# Patient Record
Sex: FEMALE | Race: BLACK OR AFRICAN AMERICAN | NOT HISPANIC OR LATINO | Employment: FULL TIME | URBAN - METROPOLITAN AREA
[De-identification: names, ages, dates, MRNs, and addresses within clinical notes are randomized per-mention and may not be internally consistent; named-entity substitution may affect disease eponyms.]

---

## 2017-01-30 ENCOUNTER — OFFICE VISIT (OUTPATIENT)
Dept: OBSTETRICS AND GYNECOLOGY | Facility: CLINIC | Age: 32
End: 2017-01-30
Payer: MEDICAID

## 2017-01-30 VITALS
HEIGHT: 68 IN | WEIGHT: 249.81 LBS | BODY MASS INDEX: 37.86 KG/M2 | SYSTOLIC BLOOD PRESSURE: 116 MMHG | DIASTOLIC BLOOD PRESSURE: 66 MMHG | TEMPERATURE: 99 F

## 2017-01-30 DIAGNOSIS — R10.2 PELVIC PAIN IN FEMALE: Primary | ICD-10-CM

## 2017-01-30 DIAGNOSIS — E11.9 CONTROLLED TYPE 2 DIABETES MELLITUS WITHOUT COMPLICATION, WITHOUT LONG-TERM CURRENT USE OF INSULIN: ICD-10-CM

## 2017-01-30 DIAGNOSIS — N94.10 DYSPAREUNIA, FEMALE: ICD-10-CM

## 2017-01-30 PROCEDURE — 99213 OFFICE O/P EST LOW 20 MIN: CPT | Mod: PBBFAC | Performed by: OBSTETRICS & GYNECOLOGY

## 2017-01-30 PROCEDURE — 99999 PR PBB SHADOW E&M-EST. PATIENT-LVL III: CPT | Mod: PBBFAC,,, | Performed by: OBSTETRICS & GYNECOLOGY

## 2017-01-30 PROCEDURE — 99213 OFFICE O/P EST LOW 20 MIN: CPT | Mod: S$PBB,,, | Performed by: OBSTETRICS & GYNECOLOGY

## 2017-01-30 RX ORDER — TRIAMTERENE AND HYDROCHLOROTHIAZIDE 37.5; 25 MG/1; MG/1
CAPSULE ORAL
Refills: 5 | COMMUNITY
Start: 2017-01-03 | End: 2018-10-30 | Stop reason: CLARIF

## 2017-01-30 RX ORDER — AMITRIPTYLINE HYDROCHLORIDE 25 MG/1
25 TABLET, FILM COATED ORAL DAILY
Refills: 2 | COMMUNITY
Start: 2017-01-17 | End: 2017-01-30

## 2017-01-30 RX ORDER — TRAMADOL HYDROCHLORIDE 50 MG/1
50 TABLET ORAL 2 TIMES DAILY
Refills: 0 | COMMUNITY
Start: 2017-01-17 | End: 2017-05-20

## 2017-01-30 NOTE — MR AVS SNAPSHOT
[facility] - OB/ GYN  [address]  [address]  [place] [address]  Phone: [phone]                  Dimas Aguayo   2017 3:10 PM   Office Visit    Description:  Female : 1985   Provider:  Waqas Jiang MD   Department:  [facility] - OB/ GYN           Reason for Visit     Pelvic Pain           Diagnoses this Visit        Comments    Pelvic pain in female    -  Primary     Controlled type 2 diabetes mellitus without complication, without long-term current use of insulin         Dyspareunia, female                To Do List           Goals (5 Years of Data)     None      Follow-Up and Disposition     Return in about 1 year (around 2018).      [facility]     [facility] Nurse Care Line -  Assistance  Registered nurses in the [facility] provide clinical advisement, health education, appointment booking, and other advisory services.  Call for this free service at [phone].             Medications           Message regarding Medications     Verify the changes and/or additions to your medication regime listed below are the same as discussed with your clinician today.  If any of these changes or additions are incorrect, please notify your healthcare provider.        STOP taking these medications     zolpidem (AMBIEN) 10 mg Tab Take 10 mg by mouth nightly as needed.    ibuprofen (ADVIL,MOTRIN) 800 MG tablet Take 1 tablet (800 mg total) by mouth every 6 (six) hours as needed for Pain.    amitriptyline (ELAVIL) 25 MG tablet Take 25 mg by mouth once daily.           Verify that the below list of medications is an accurate representation of the medications you are currently taking.  If none reported, the list may be blank. If incorrect, please contact your healthcare provider. Carry this list with you in case of emergency.           Current Medications     metformin (GLUCOPHAGE) 1000 MG tablet     pregabalin (LYRICA) 300 MG Cap Take 300 mg by mouth 3 (three) times  "daily.    tramadol (ULTRAM) 50 mg tablet Take 50 mg by mouth 2 (two) times daily.    triamterene-hydrochlorothiazide 37.5-25 mg (DYAZIDE) 37.5-25 mg per capsule TAKE 1 CAPSULE DAILY ORAL FOR 30 DAYS    TRUERESULT BLOOD GLUCOSE SYSTM kit USE WITH TEST STRIPS THREE TIMES A DAY    TRUETEST TEST STRIPS Strp TEST BLOOD GLUCOSE THREE TIMES A DAY    ULTRA THIN LANCETS 30 gauge Misc USE TO TEST BLOOD GLUCOSE THREE TIMES A DAY           Clinical Reference Information           Vital Signs - Last Recorded  Most recent update: 1/30/2017  4:25 PM by Josseline Mendoza MA    BP Temp Ht Wt LMP BMI    116/66 (BP Location: Right arm, Patient Position: Sitting, BP Method: Manual) 98.6 °F (37 °C) (Oral) 5' 8" (1.727 m) 113.3 kg (249 lb 12.5 oz) 05/23/2013 37.98 kg/m2      Blood Pressure          Most Recent Value    BP  116/66      Allergies as of 1/30/2017     No Known Allergies      Immunizations Administered on Date of Encounter - 1/30/2017     None      "

## 2017-01-30 NOTE — PROGRESS NOTES
"  Subjective:       Patient ID: Dimas Aguayo is a 31 y.o. female.    Chief Complaint:  Pelvic Pain      History of Present Illness  HPI  Patient comes in today with multiple complaints.  1.  She still has occasional pelvic pain.  But none today.  She is status post hysterectomy for pain.  Significantly improves since  2.  She occasionally would have dyspareunia.  But recently, she has had no problem.  However, her partner seems to have a severe headache "every time he would climax!"  3.  She was recently diagnosed with diabetes mellitus, type II.  She was given medication.  But she had no guidance as to what to eat and what else to do to help control her glucose.  Her fasting has been about 70-90 with 2hr postprandial less than 140 mostly.      GYN & OB History  Patient's last menstrual period was 2013.   Date of Last Pap: 2015    OB History    Para Term  AB SAB TAB Ectopic Multiple Living   3 2 2  1     2      # Outcome Date GA Lbr Doug/2nd Weight Sex Delivery Anes PTL Lv   3 Term 09 40w0d  3.714 kg (8 lb 3 oz) M Vag-Spont EPI N Y   2 AB 2006           1 Term 01 40w0d  3.657 kg (8 lb 1 oz) F Vag-Spont EPI N Y        Past Medical History   Diagnosis Date    Asthma      as a child    Diabetes mellitus     Fibromyalgia     GERD (gastroesophageal reflux disease)     Herpes simplex without mention of complication     History of pulmonary embolus during pregnancy 2009     after her second  section    Hypertension      off meds x 1 year    Mental disorder      depression and migraine headaches       Past Surgical History   Procedure Laterality Date    Tubal ligation  2009    Marsupialization of bartholin's cyst in  and   2004 and      section      Dilation and curettage of uterus       for elective abortions    Hta  2012     Dr Jiang    Uterine ablation  12    Hysterectomy  2013     [facility]       Family History   Problem " Relation Age of Onset    Diabetes Mother     Hypertension Mother     Stroke Mother        Social History     Social History    Marital status: Single     Spouse name: N/A    Number of children: N/A    Years of education: N/A     Social History Main Topics    Smoking status: Former Smoker     Quit date: 1/1/2015    Smokeless tobacco: Never Used      Comment: One- 2  cigarettes  / day    Alcohol use 0.6 oz/week     1 Shots of liquor per week      Comment: occassionally    Drug use: No    Sexual activity: Yes     Partners: Male     Birth control/ protection: Surgical     Other Topics Concern    None     Social History Narrative    Working as a make-up artist    New partner for the past two 2014       Current Outpatient Prescriptions   Medication Sig Dispense Refill    metformin (GLUCOPHAGE) 1000 MG tablet       pregabalin (LYRICA) 300 MG Cap Take 300 mg by mouth 3 (three) times daily.      tramadol (ULTRAM) 50 mg tablet Take 50 mg by mouth 2 (two) times daily.  0    triamterene-hydrochlorothiazide 37.5-25 mg (DYAZIDE) 37.5-25 mg per capsule TAKE 1 CAPSULE DAILY ORAL FOR 30 DAYS  5    TRUERESULT BLOOD GLUCOSE SYSTM kit USE WITH TEST STRIPS THREE TIMES A DAY  0    TRUETEST TEST STRIPS Strp TEST BLOOD GLUCOSE THREE TIMES A DAY  5    ULTRA THIN LANCETS 30 gauge Misc USE TO TEST BLOOD GLUCOSE THREE TIMES A DAY  5     No current facility-administered medications for this visit.        Review of patient's allergies indicates:  No Known Allergies      Review of Systems  Review of Systems   Constitutional: Negative for activity change, appetite change, chills, fatigue, fever and unexpected weight change.   HENT: Negative for mouth sores.    Respiratory: Negative for cough, shortness of breath and wheezing.    Cardiovascular: Negative for chest pain and palpitations.   Gastrointestinal: Negative for abdominal pain, bloating, blood in stool, constipation, nausea and vomiting.   Endocrine: Negative for diabetes  and hot flashes.   Genitourinary: Positive for dyspareunia and pelvic pain. Negative for dysuria, frequency, hematuria, menorrhagia, menstrual problem, urgency, vaginal bleeding, vaginal discharge, vaginal pain, dysmenorrhea, urinary incontinence, postcoital bleeding and vaginal odor.   Musculoskeletal: Negative for back pain and myalgias.   Skin:  Negative for rash.   Neurological: Negative for seizures and headaches.   Psychiatric/Behavioral: Negative for depression and sleep disturbance. The patient is nervous/anxious.    Breast: Negative for breast mass, breast pain and nipple discharge          Objective:    Physical Exam:   Constitutional: She appears well-developed and well-nourished. No distress.    HENT:   Head: Normocephalic and atraumatic.    Eyes: EOM are normal.    Neck: Normal range of motion.     Pulmonary/Chest: Effort normal. No respiratory distress.        Abdominal: Soft. She exhibits no distension. There is no tenderness. There is no rebound and no guarding.     Genitourinary: Vagina normal. No vaginal discharge found.   Genitourinary Comments: Vulva without any obvious lesions.  Vaginal vault with good support.  Minimal discharge noted.  No obvious lesion.  Cervix is without any cervical motion tenderness.  No obvious lesion.  Very high in vaginal vault.  Uterus is surgically absent.  Adnexa is without any masses or tenderness.           Musculoskeletal: Normal range of motion.       Neurological: She is alert.    Skin: Skin is warm and dry.    Psychiatric: She has a normal mood and affect.          Assessment:        1. Pelvic pain in female    2. Controlled type 2 diabetes mellitus without complication, without long-term current use of insulin    3. Dyspareunia, female             Plan:      I have extensively discussed with the patient regarding her condition  We will refer her to dietary for a proper diet.  Diabetic teaching discussed    I suggest that her partner should get a proper  evaluation  She will be back as needed.

## 2017-04-12 ENCOUNTER — TELEPHONE (OUTPATIENT)
Dept: OBSTETRICS AND GYNECOLOGY | Facility: CLINIC | Age: 32
End: 2017-04-12

## 2017-04-12 NOTE — TELEPHONE ENCOUNTER
----- Message from Morenita Thao sent at 4/12/2017 10:16 AM CDT -----  Contact: self  Pt is requesting a rx for yeast. Please call pt with advise [phone]          Thanks  04/12/2017 10:59 AM  informed her that cultures have not been take since 2015 and  would want to see her in the office., pt tried to argue with me and tell me that she was cultured many of times in 2016 and that I don't know what I'm looking at in her chart. Pt asked to speak to Safia and pt is aware that  and Safia are out of the office until next week. Pt aware request has been routed to .

## 2017-04-13 ENCOUNTER — TELEPHONE (OUTPATIENT)
Dept: OBSTETRICS AND GYNECOLOGY | Facility: CLINIC | Age: 32
End: 2017-04-13

## 2017-04-13 NOTE — TELEPHONE ENCOUNTER
----- Message from Jessie Drake sent at 4/13/2017  3:42 PM CDT -----  Contact: SELF  Patient is checking on the status of the message she left yesterday .     [phone]          04/13/2017 4:20 PM  Informed pt that  is off until monday

## 2017-04-19 DIAGNOSIS — B37.31 CANDIDA VAGINITIS: Primary | ICD-10-CM

## 2017-04-19 DIAGNOSIS — A60.04 HERPES SIMPLEX VULVOVAGINITIS: ICD-10-CM

## 2017-04-19 RX ORDER — FLUCONAZOLE 150 MG/1
150 TABLET ORAL DAILY
Qty: 1 TABLET | Refills: 0 | Status: SHIPPED | OUTPATIENT
Start: 2017-04-19 | End: 2017-04-20

## 2017-04-19 RX ORDER — VALACYCLOVIR HYDROCHLORIDE 500 MG/1
500 TABLET, FILM COATED ORAL 2 TIMES DAILY
Qty: 10 TABLET | Refills: 2 | Status: SHIPPED | OUTPATIENT
Start: 2017-04-19 | End: 2017-10-25

## 2017-04-19 NOTE — TELEPHONE ENCOUNTER
Pt requesting rx for yeast and valtrex. Refill request submitted to provider. Pt informed.     Diflucan and Valtrex given per request

## 2017-04-19 NOTE — TELEPHONE ENCOUNTER
----- Message from Yeimi Ribera sent at 4/19/2017  1:39 PM CDT -----  Contact: self  Pt calling to discuss a personal matter. Please call [phone]

## 2017-04-28 ENCOUNTER — HOSPITAL ENCOUNTER (EMERGENCY)
Facility: OTHER | Age: 32
Discharge: HOME | End: 2017-04-28
Attending: EMERGENCY MEDICINE
Payer: MEDICAID

## 2017-04-28 VITALS
TEMPERATURE: 97 F | HEART RATE: 88 BPM | RESPIRATION RATE: 18 BRPM | OXYGEN SATURATION: 97 % | SYSTOLIC BLOOD PRESSURE: 140 MMHG | HEIGHT: 68 IN | BODY MASS INDEX: 36.68 KG/M2 | DIASTOLIC BLOOD PRESSURE: 97 MMHG | WEIGHT: 242 LBS

## 2017-04-28 DIAGNOSIS — H60.501 ACUTE OTITIS EXTERNA OF RIGHT EAR, UNSPECIFIED TYPE: ICD-10-CM

## 2017-04-28 DIAGNOSIS — H66.91 RIGHT OTITIS MEDIA, UNSPECIFIED CHRONICITY, UNSPECIFIED OTITIS MEDIA TYPE: Primary | ICD-10-CM

## 2017-04-28 PROBLEM — H60.90 OTITIS EXTERNA: Status: ACTIVE | Noted: 2017-04-28

## 2017-04-28 LAB — POCT GLUCOSE: 111 MG/DL (ref 70–110)

## 2017-04-28 PROCEDURE — 25000003 PHARM REV CODE 250: Performed by: EMERGENCY MEDICINE

## 2017-04-28 PROCEDURE — 99284 EMERGENCY DEPT VISIT MOD MDM: CPT

## 2017-04-28 RX ORDER — TRAMADOL HYDROCHLORIDE 50 MG/1
50 TABLET ORAL EVERY 8 HOURS PRN
Qty: 15 TABLET | Refills: 0 | Status: SHIPPED | OUTPATIENT
Start: 2017-04-28 | End: 2017-05-08

## 2017-04-28 RX ORDER — NEOMYCIN SULFATE, POLYMYXIN B SULFATE AND HYDROCORTISONE 10; 3.5; 1 MG/ML; MG/ML; [USP'U]/ML
3 SUSPENSION/ DROPS AURICULAR (OTIC) 3 TIMES DAILY
Qty: 15 ML | Refills: 0 | Status: SHIPPED | OUTPATIENT
Start: 2017-04-28 | End: 2017-10-25

## 2017-04-28 RX ORDER — ACETAMINOPHEN 500 MG
1000 TABLET ORAL
Status: COMPLETED | OUTPATIENT
Start: 2017-04-28 | End: 2017-04-28

## 2017-04-28 RX ORDER — AMOXICILLIN 500 MG/1
500 CAPSULE ORAL 3 TIMES DAILY
Qty: 21 CAPSULE | Refills: 0 | Status: SHIPPED | OUTPATIENT
Start: 2017-04-28 | End: 2017-05-05

## 2017-04-28 RX ADMIN — ACETAMINOPHEN 1000 MG: 500 TABLET ORAL at 10:04

## 2017-04-28 NOTE — ED AVS SNAPSHOT
[facility] EMERGENCY DEPARTMENT  [address] [place] [address]               Dimas Aguayo   2017 10:00 AM   ED    Description:  Female : 1985   Department:  [facility]           Your Care was Coordinated By:     Provider Role From To    Tito Diane MD Attending Provider [phone] --      Reason for Visit     Otalgia           Diagnoses this Visit        Comments    Right otitis media, unspecified chronicity, unspecified otitis media type    -  Primary     Acute otitis externa of right ear, unspecified type           ED Disposition     ED Disposition Condition Comment    Discharge  Dimas Aguayo discharge to home/self care.    - Condition at discharge: Stable  - Mode of Discharge: by walking out            To Do List           Follow-up Information     Follow up with Conner Tellez Jr, MD In 2 days.    Specialty:  Family Medicine    Why:  recheck    Contact information:    [address] [facility] [address]  [phone]         These Medications        Disp Refills Start End    neomycin-polymyxin-hydrocortisone (CORTISPORIN) 3.5-10,000-1 mg/mL-unit/mL-% otic suspension 15 mL 0 2017     Place 3 drops into both ears 3 (three) times daily. - Both Ears    Pharmacy: [facility]/pharmacy #[id number] - [address] Ph #: [phone]       amoxicillin (AMOXIL) 500 MG capsule 21 capsule 0 2017    Take 1 capsule (500 mg total) by mouth 3 (three) times daily. - Oral    Pharmacy: [facility]/pharmacy #[id number] [address] Ph #: [phone]       tramadol (ULTRAM) 50 mg tablet 15 tablet 0 2017    Take 1 tablet (50 mg total) by mouth every 8 (eight) hours as needed for Pain. - Oral    Pharmacy: [facility]/pharmacy #[id number] [address] Ph #: [phone]         [facility]     [facility] Care [facility] -   Assistance  Unless otherwise directed by your provider, please contact [facility], our nurse care line that is available for 24/7 assistance.     Registered nurses in the [facility] provide: appointment scheduling, clinical advisement, health education, and other advisory services.  Call: [phone] (toll free)               Medications           Message regarding Medications     Verify the changes and/or additions to your medication regime listed below are the same as discussed with your clinician today.  If any of these changes or additions are incorrect, please notify your healthcare provider.        START taking these NEW medications        Refills    neomycin-polymyxin-hydrocortisone (CORTISPORIN) 3.5-10,000-1 mg/mL-unit/mL-% otic suspension 0    Sig: Place 3 drops into both ears 3 (three) times daily.    Class: Print    Route: Both Ears    amoxicillin (AMOXIL) 500 MG capsule 0    Sig: Take 1 capsule (500 mg total) by mouth 3 (three) times daily.    Class: Print    Route: Oral    tramadol (ULTRAM) 50 mg tablet 0    Sig: Take 1 tablet (50 mg total) by mouth every 8 (eight) hours as needed for Pain.    Class: Print    Route: Oral      These medications were administered today        Dose Freq    acetaminophen tablet 1,000 mg 1,000 mg ED 1 Time    Sig: Take 2 tablets (1,000 mg total) by mouth ED 1 Time.    Class: Normal    Route: Oral           Verify that the below list of medications is an accurate representation of the medications you are currently taking.  If none reported, the list may be blank. If incorrect, please contact your healthcare provider. Carry this list with you in case of emergency.           Current Medications     acetaminophen tablet 1,000 mg Take 2 tablets (1,000 mg total) by mouth ED 1 Time.    amoxicillin (AMOXIL) 500 MG capsule Take 1 capsule (500 mg total) by mouth 3 (three) times daily.    metformin (GLUCOPHAGE) 1000 MG tablet     neomycin-polymyxin-hydrocortisone  "(CORTISPORIN) 3.5-10,000-1 mg/mL-unit/mL-% otic suspension Place 3 drops into both ears 3 (three) times daily.    pregabalin (LYRICA) 300 MG Cap Take 300 mg by mouth 3 (three) times daily.    tramadol (ULTRAM) 50 mg tablet Take 50 mg by mouth 2 (two) times daily.    tramadol (ULTRAM) 50 mg tablet Take 1 tablet (50 mg total) by mouth every 8 (eight) hours as needed for Pain.    triamterene-hydrochlorothiazide 37.5-25 mg (DYAZIDE) 37.5-25 mg per capsule TAKE 1 CAPSULE DAILY ORAL FOR 30 DAYS    TRUERESULT BLOOD GLUCOSE SYSTM kit USE WITH TEST STRIPS THREE TIMES A DAY    TRUETEST TEST STRIPS Strp TEST BLOOD GLUCOSE THREE TIMES A DAY    ULTRA THIN LANCETS 30 gauge Misc USE TO TEST BLOOD GLUCOSE THREE TIMES A DAY    valacyclovir (VALTREX) 500 MG tablet Take 1 tablet (500 mg total) by mouth 2 (two) times daily.           Clinical Reference Information           Your Vitals Were     BP Pulse Temp Resp Height Weight    140/97 (BP Location: Left arm, Patient Position: Sitting) 88 97 °F (36.1 °C) (Temporal) 18 5' 8" (1.727 m) 109.8 kg (242 lb)    Last Period SpO2 BMI          05/23/2013 97% 36.8 kg/m2        Allergies as of 4/28/2017     No Known Allergies      Immunizations Administered on Date of Encounter - 4/28/2017     None      ED Micro, Lab, POCT     Start Ordered       Status Ordering Provider    04/28/17 1007 04/28/17 1007  POCT glucose  Once      Final result       ED Imaging Orders     None        Discharge Instructions         Reducing the Risk of Middle Ear Infections     Good handwashing can help your child prevent ear infections.   Most children have had at least one middle ear infection by the age of 2. Treatment may depend on whether the problem is acute or chronic, as well as how often it comes back and how long it lasts.   Reducing risk factors  Some behaviors or surroundings increase your childs risk of ear infection. Reducing such risk factors can be helpful at any point in treatment. The tips below may " help.  · If your child goes to group , he or she runs a greater risk of getting colds or flu, which may then lead to an ear infection. Help prevent these illnesses by teaching your child to wash his or her hands often.  · If your child has nasal allergies, do your best to control dust, mold, mildew, and pet hair in the house. Also stop or greatly limit your childs contact with secondhand smoke.  · If food allergies are a problem, identify the food that triggers the reaction and help your child avoid it.  Watching and waiting  · If your child is diagnosed with an ear infection, the doctor may prescribe antibiotics and will suggest a period of watchful waiting. This means not filling any prescriptions right away. Instead of antibiotics, a trial of medications to relieve symptoms including those for pain or fever, is advised, along with waiting some time to see if a child improves without antibiotic therapy. Whether or not your doctor prescribes immediate antibiotics or a period of watchful waiting depends on your child's age and risk factors.  · During this time, your child should be monitored to see if his or her symptoms are improving and to make sure new symptoms, such as fever or vomiting, don't develop. If a child doesn't improve within a few days or develops new symptoms, antibiotics will usually be started.    Date Last Reviewed: 9/3/2014  © 4891-2704 [employer]. [address] [place] [address], [place] [address]. All rights reserved. This information is not intended as a substitute for professional medical care. Always follow your healthcare professional's instructions.          External Ear Infection (Adult)    External otitis (also called swimmers ear) is an infection in the ear canal. It is often caused by bacteria or fungus. It can occur a few days after water gets trapped in the ear canal (from swimming or bathing). It can also occur after cleaning too deeply in the ear canal with a  cotton swab or other object. Sometimes, hair care products get into the ear canal and cause this problem.  Symptoms can include pain, fever, itching, redness, drainage, or swelling of the ear canal. Temporary hearing loss may also occur.  Home care  · Do not try to clean the ear canal. This can push pus and bacteria deeper into the canal.  · Use prescribed ear drops as directed. These help reduce swelling and fight the infection. If an ear wick was placed in the ear canal, apply drops right onto the end of the wick. The wick will draw the medication into the ear canal even if it is swollen closed.  · A cotton ball may be loosely placed in the outer ear to absorb any drainage.  · You may use acetaminophen or ibuprofen to control pain, unless another medication was prescribed. Note: If you have chronic liver or kidney disease or ever had a stomach ulcer or GI bleeding, talk to your health care provider before taking any of these medications.  · Do not allow water to get into your ear when bathing. Also, avoid swimming until the infection has cleared.  Prevention  · Keep your ears dry. This helps lower the risk of infection. Dry your ears with a towel or hair dryer after getting wet. Also, use ear plugs when swimming.  · Do not stick any objects in the ear to remove wax.  · If you feel water trapped in your ear, use ear drops right away. You can get these drops over the counter at most drugstores. They work by removing water from the ear canal.  Follow-up care  Follow up with your health care provider in one week, or as advised.  When to seek medical advice  Call your health care provider right away if any of these occur:  · Ear pain becomes worse or doesnt improve after 3 days of treatment  · Redness or swelling of the outer ear occurs or gets worse  · Headache  · Painful or stiff neck  · Drowsiness or confusion  · Fever of 100.4ºF (38ºC) or higher, or as directed by your health care provider  · Seizure  Date Last  Reviewed: 3/22/2015  © 8579-9684 [employer] [address] [place] [address], [place] [address]. All rights reserved. This information is not intended as a substitute for professional medical care. Always follow your healthcare professional's instructions.          Discharge References/Attachments     OTITIS MEDIA, ANTIBIOTIC TREATMENT (ADULT) (ENGLISH)    EXTERNAL EAR INFECTION (ADULT) (ENGLISH)      Smoking Cessation     If you would like to quit smoking:   You may be eligible for free services if you are a [place] resident and started smoking cigarettes before September 1, 1988.  Call the Smoking Cessation Trust (SCT) toll free at (879) 219-7578 or (525) 619-1501.   Call 1-800-QUIT-NOW if you do not meet the above criteria.   Contact us via email: [email or web address]   View our website for more information: [email or web address]         [facility] complies with applicable Federal civil rights laws and does not discriminate on the basis of race, color, national origin, age, disability, or sex.        Language Assistance Services     ATTENTION: Language assistance services are available, free of charge. Please call [phone].      ATENCIÓN: Si habla español, tiene a perrin disposición servicios gratuitos de asistencia lingüística. Llame al [phone].     CHÚ Ý: N?u b?n nói Ti?ng Vi?t, có các d?ch v? h? tr? ngôn ng? mi?n phí dành cho b?n. G?i s? [phone].

## 2017-04-28 NOTE — ED PROVIDER NOTES
"Encounter Date: 2017       History     Chief Complaint   Patient presents with    Otalgia      x 1 day     Review of patient's allergies indicates:  No Known Allergies  HPI Comments: Patient reports pain in right ear for one day.  Put hydrogen peroxide in the ear and it caused "bubbling and more pain".  Reports history of diabetes which she states she controls well and her sugars are always right around 100.  Has no other complaints.    Patient is a 31 y.o. female presenting with the following complaint: ear pain.   Otalgia   This is a new problem. The current episode started yesterday. There is pain in the right ear. The problem occurs constantly. The problem has been unchanged. Pertinent negatives include no ear discharge, no headaches, no hearing loss, no rhinorrhea, no sore throat, no abdominal pain, no diarrhea, no vomiting, no cough and no rash. Her past medical history does not include chronic ear infection, hearing loss or tympanostomy tube.     Past Medical History:   Diagnosis Date    Asthma     as a child    Diabetes mellitus     Fibromyalgia     GERD (gastroesophageal reflux disease)     Herpes simplex without mention of complication     History of pulmonary embolus during pregnancy 2009    after her second  section    Hypertension     off meds x 1 year    Mental disorder     depression and migraine headaches     Past Surgical History:   Procedure Laterality Date     SECTION      DILATION AND CURETTAGE OF UTERUS      for elective abortions    HTA  2012    Dr Jiang    [facility]    marsupialization of Bartholin's cyst in  and 2005 and     TUBAL LIGATION  2009    uterine ablation  12     Family History   Problem Relation Age of Onset    Diabetes Mother     Hypertension Mother     Stroke Mother      Social History   Substance Use Topics    Smoking status: Former Smoker     Quit date: 2015    Smokeless tobacco: " Never Used      Comment: One- 2  cigarettes  / day    Alcohol use 0.6 oz/week     1 Shots of liquor per week      Comment: occassionally     Review of Systems   Constitutional: Negative.    HENT: Positive for ear pain. Negative for ear discharge, facial swelling, hearing loss, nosebleeds, postnasal drip, rhinorrhea, sinus pressure and sore throat.    Respiratory: Negative for cough.    Gastrointestinal: Negative for abdominal pain, diarrhea and vomiting.   Skin: Negative for rash.   Neurological: Negative for headaches.   All other systems reviewed and are negative.      Physical Exam   Initial Vitals   BP Pulse Resp Temp SpO2   04/28/17 1001 04/28/17 1001 04/28/17 1001 04/28/17 1001 04/28/17 1001   140/97 88 18 97 °F (36.1 °C) 97 %     Physical Exam    Nursing note and vitals reviewed.  Constitutional: She appears well-developed and well-nourished. She is not diaphoretic. She appears distressed.   HENT:   Head: Normocephalic and atraumatic.   Right Ear: No lacerations. There is drainage and swelling. No foreign bodies. No mastoid tenderness. Tympanic membrane is erythematous. A middle ear effusion is present. No hemotympanum.   Left Ear: Hearing, tympanic membrane, external ear and ear canal normal.   Ears:    Nose: Nose normal.   Mouth/Throat: Oropharynx is clear and moist.   Eyes: EOM are normal. Pupils are equal, round, and reactive to light.   Pulmonary/Chest: No respiratory distress.   Musculoskeletal: Normal range of motion.   Neurological: She is alert and oriented to person, place, and time.   Skin: Skin is warm and dry. No rash noted. No erythema.   Psychiatric: She has a normal mood and affect. Her behavior is normal. Judgment and thought content normal.         ED Course   Procedures  Labs Reviewed   POCT GLUCOSE - Abnormal; Notable for the following:        Result Value    POCT Glucose 111 (*)     All other components within normal limits             Medical Decision Making:   ED Management:  Patient  is stable for discharge we'll treat for otitis media and otitis externa.  We discussed keeping strict glucose control.  We discussed worrisome signs that should prompt need to return to the ER should they occur.  There is no indication for further emergent intervention or evaluation at this time.                   ED Course     Clinical Impression:   The primary encounter diagnosis was Right otitis media, unspecified chronicity, unspecified otitis media type. A diagnosis of Acute otitis externa of right ear, unspecified type was also pertinent to this visit.          Tito Diane MD  04/29/17 0705

## 2017-04-28 NOTE — DISCHARGE INSTRUCTIONS
Reducing the Risk of Middle Ear Infections     Good handwashing can help your child prevent ear infections.   Most children have had at least one middle ear infection by the age of 2. Treatment may depend on whether the problem is acute or chronic, as well as how often it comes back and how long it lasts.   Reducing risk factors  Some behaviors or surroundings increase your childs risk of ear infection. Reducing such risk factors can be helpful at any point in treatment. The tips below may help.  · If your child goes to group , he or she runs a greater risk of getting colds or flu, which may then lead to an ear infection. Help prevent these illnesses by teaching your child to wash his or her hands often.  · If your child has nasal allergies, do your best to control dust, mold, mildew, and pet hair in the house. Also stop or greatly limit your childs contact with secondhand smoke.  · If food allergies are a problem, identify the food that triggers the reaction and help your child avoid it.  Watching and waiting  · If your child is diagnosed with an ear infection, the doctor may prescribe antibiotics and will suggest a period of watchful waiting. This means not filling any prescriptions right away. Instead of antibiotics, a trial of medications to relieve symptoms including those for pain or fever, is advised, along with waiting some time to see if a child improves without antibiotic therapy. Whether or not your doctor prescribes immediate antibiotics or a period of watchful waiting depends on your child's age and risk factors.  · During this time, your child should be monitored to see if his or her symptoms are improving and to make sure new symptoms, such as fever or vomiting, don't develop. If a child doesn't improve within a few days or develops new symptoms, antibiotics will usually be started.    Date Last Reviewed: 9/3/2014  © 3934-9438 [employer]. [address], [place]  [address]. All rights reserved. This information is not intended as a substitute for professional medical care. Always follow your healthcare professional's instructions.          External Ear Infection (Adult)    External otitis (also called swimmers ear) is an infection in the ear canal. It is often caused by bacteria or fungus. It can occur a few days after water gets trapped in the ear canal (from swimming or bathing). It can also occur after cleaning too deeply in the ear canal with a cotton swab or other object. Sometimes, hair care products get into the ear canal and cause this problem.  Symptoms can include pain, fever, itching, redness, drainage, or swelling of the ear canal. Temporary hearing loss may also occur.  Home care  · Do not try to clean the ear canal. This can push pus and bacteria deeper into the canal.  · Use prescribed ear drops as directed. These help reduce swelling and fight the infection. If an ear wick was placed in the ear canal, apply drops right onto the end of the wick. The wick will draw the medication into the ear canal even if it is swollen closed.  · A cotton ball may be loosely placed in the outer ear to absorb any drainage.  · You may use acetaminophen or ibuprofen to control pain, unless another medication was prescribed. Note: If you have chronic liver or kidney disease or ever had a stomach ulcer or GI bleeding, talk to your health care provider before taking any of these medications.  · Do not allow water to get into your ear when bathing. Also, avoid swimming until the infection has cleared.  Prevention  · Keep your ears dry. This helps lower the risk of infection. Dry your ears with a towel or hair dryer after getting wet. Also, use ear plugs when swimming.  · Do not stick any objects in the ear to remove wax.  · If you feel water trapped in your ear, use ear drops right away. You can get these drops over the counter at most drugstores. They work by removing water from the  ear canal.  Follow-up care  Follow up with your health care provider in one week, or as advised.  When to seek medical advice  Call your health care provider right away if any of these occur:  · Ear pain becomes worse or doesnt improve after 3 days of treatment  · Redness or swelling of the outer ear occurs or gets worse  · Headache  · Painful or stiff neck  · Drowsiness or confusion  · Fever of 100.4ºF (38ºC) or higher, or as directed by your health care provider  · Seizure  Date Last Reviewed: 3/22/2015  © 9166-5560 [employer]. [address] [place] [address]. All rights reserved. This information is not intended as a substitute for professional medical care. Always follow your healthcare professional's instructions.

## 2017-05-20 ENCOUNTER — HOSPITAL ENCOUNTER (EMERGENCY)
Facility: HOSPITAL | Age: 32
Discharge: HOME | End: 2017-05-20
Attending: EMERGENCY MEDICINE
Payer: MEDICAID

## 2017-05-20 VITALS
DIASTOLIC BLOOD PRESSURE: 78 MMHG | RESPIRATION RATE: 20 BRPM | HEART RATE: 85 BPM | BODY MASS INDEX: 36.07 KG/M2 | HEIGHT: 68 IN | TEMPERATURE: 98 F | SYSTOLIC BLOOD PRESSURE: 126 MMHG | WEIGHT: 238 LBS | OXYGEN SATURATION: 95 %

## 2017-05-20 DIAGNOSIS — S61.412A LACERATION OF LEFT HAND WITHOUT FOREIGN BODY, INITIAL ENCOUNTER: Primary | ICD-10-CM

## 2017-05-20 PROCEDURE — 99283 EMERGENCY DEPT VISIT LOW MDM: CPT | Mod: 25

## 2017-05-20 PROCEDURE — 25000003 PHARM REV CODE 250: Performed by: PHYSICIAN ASSISTANT

## 2017-05-20 PROCEDURE — 12002 RPR S/N/AX/GEN/TRNK2.6-7.5CM: CPT

## 2017-05-20 RX ORDER — SULFAMETHOXAZOLE AND TRIMETHOPRIM 800; 160 MG/1; MG/1
2 TABLET ORAL 2 TIMES DAILY
Qty: 28 TABLET | Refills: 0 | Status: SHIPPED | OUTPATIENT
Start: 2017-05-20 | End: 2017-05-27

## 2017-05-20 RX ORDER — CLINDAMYCIN HYDROCHLORIDE 150 MG/1
300 CAPSULE ORAL EVERY 6 HOURS
Status: DISCONTINUED | OUTPATIENT
Start: 2017-05-20 | End: 2017-05-20

## 2017-05-20 RX ORDER — FLUCONAZOLE 200 MG/1
200 TABLET ORAL ONCE
Qty: 1 TABLET | Refills: 1 | Status: SHIPPED | OUTPATIENT
Start: 2017-05-20 | End: 2017-05-20

## 2017-05-20 RX ORDER — LIDOCAINE HYDROCHLORIDE AND EPINEPHRINE 20; 10 MG/ML; UG/ML
10 INJECTION, SOLUTION INFILTRATION; PERINEURAL
Status: COMPLETED | OUTPATIENT
Start: 2017-05-20 | End: 2017-05-20

## 2017-05-20 RX ORDER — CLINDAMYCIN HYDROCHLORIDE 150 MG/1
450 CAPSULE ORAL ONCE
Status: COMPLETED | OUTPATIENT
Start: 2017-05-20 | End: 2017-05-20

## 2017-05-20 RX ORDER — HYDROCODONE BITARTRATE AND ACETAMINOPHEN 5; 325 MG/1; MG/1
1 TABLET ORAL
Status: COMPLETED | OUTPATIENT
Start: 2017-05-20 | End: 2017-05-20

## 2017-05-20 RX ORDER — IBUPROFEN 600 MG/1
600 TABLET ORAL EVERY 6 HOURS PRN
Qty: 30 TABLET | Refills: 0 | Status: SHIPPED | OUTPATIENT
Start: 2017-05-20 | End: 2017-10-25

## 2017-05-20 RX ADMIN — CLINDAMYCIN HYDROCHLORIDE 450 MG: 150 CAPSULE ORAL at 03:05

## 2017-05-20 RX ADMIN — BACITRACIN, NEOMYCIN, POLYMYXIN B 1 EACH: 400; 3.5; 5 OINTMENT TOPICAL at 02:05

## 2017-05-20 RX ADMIN — HYDROCODONE BITARTRATE AND ACETAMINOPHEN 1 TABLET: 5; 325 TABLET ORAL at 02:05

## 2017-05-20 RX ADMIN — LIDOCAINE HYDROCHLORIDE,EPINEPHRINE BITARTRATE 10 ML: 20; .01 INJECTION, SOLUTION INFILTRATION; PERINEURAL at 02:05

## 2017-05-20 NOTE — DISCHARGE INSTRUCTIONS
Extremity Laceration: Sutures, Staples, or Tape  A laceration is a cut through the skin. If it is deep, it may require stitches (sutures) or staples to close so it can heal. Minor cuts may be treated with surgical tape closures.   X-rays may be done if something may have entered the skin through the cut. You may also need a tetanus shot if you are not up to date on this vaccination.  Home care  · Follow the health care providers instructions on how to care for the cut.  · Wash your hands with soap and warm water before and after caring for your wound. This is to help prevent infection.  · Keep the wound clean and dry. If a bandage was applied and it becomes wet or dirty, replace it. Otherwise, leave it in place for the first 24 hours, then change it once a day or as directed.  · If sutures or staples were used, clean the wound daily:  · After removing the bandage, wash the area with soap and water. Use a wet cotton swab to loosen and remove any blood or crust that forms.  · After cleaning, keep the wound clean and dry. Talk with your doctor before applying any antibiotic ointment to the wound. Reapply the bandage.  · You may remove the bandage to shower as usual after the first 24 hours, but do not soak the area in water (no swimming) until the stitches or staples are removed.  · If surgical tape closures were used, keep the area clean and dry. If it becomes wet, blot it dry with a towel.  · The doctor may prescribe an antibiotic cream or ointment to prevent infection. Do not stop taking this medication until you have finished the prescribed course or the doctor tells you to stop. The doctor may also prescribe medications for pain. Follow the doctors instructions for taking these medications.  · Avoid activities that may reopen your wound.  Follow-up care  Follow up with your health care provider. Most skin wounds heal within ten days. However, an infection may sometimes occur despite proper treatment.  Therefore, check the wound daily for the signs of infection listed below. Stitches and staples should be removed within 7-14 days. If surgical tape closures were used, you may remove them after 10 days if they have not fallen off by then.   When to seek medical advice  Call your health care provider right away if any of these occur:  · Wound bleeding not controlled by direct pressure  · Signs of infection, including increasing pain in the wound, increasing wound redness or swelling, or pus or bad odor coming from the wound  · Fever of 100.4°F (38ºC) or higher or as directed by your healthcare provider  · Stitches or staples come apart or fall out or surgical tape falls off before 7 days  · Wound edges re-open  · Wound changes colors  · Numbness around the wound   · Decreased movement around the injured area  Date Last Reviewed: 6/14/2015  © 7506-7243 [employer]. [address] [place] [address], [place] [address]. All rights reserved. This information is not intended as a substitute for professional medical care. Always follow your healthcare professional's instructions.

## 2017-05-20 NOTE — ED AVS SNAPSHOT
[facility]  [place] [address]               Dimas Aguayo   2017  2:30 PM   ED    Description:  Female : 1985   Department:  [facility]           Your Care was Coordinated By:     Provider Role From To    Bin Romero MD Attending Provider [phone] --    Arturo Marshall PA-C Physician Assistant 17 [phone] --      Reason for Visit     Laceration           Diagnoses this Visit        Comments    Laceration of left hand without foreign body, initial encounter    -  Primary       ED Disposition     ED Disposition Condition Comment    Discharge  Take antibiotics as prescribed.  Utilize Diflucan if evidence of yeast infection after taken all her antibiotics.  One pill for treatment of yeast infection.  Return to ED if signs of infection.  Return here for suture removal in 10-14 days.             To Do List           Follow-up Information     Follow up with Conner Tellez Jr, MD. Schedule an appointment as soon as possible for a visit in 5 days.    Specialty:  Family Medicine    Why:  For wound re-check    Contact information:    [address] [facility] [id number] [phone]          Follow up with [facility] On 2017.    Specialty:  Emergency Medicine    Why:  Return to ED in 10-14 days for suture removal.  Return to ED if begin with signs of infection.    Contact information:    [place] [address] [phone]       These Medications        Disp Refills Start End    sulfamethoxazole-trimethoprim 800-160mg (BACTRIM DS) 800-160 mg Tab 28 tablet 0 2017    Take 2 tablets by mouth 2 (two) times daily. - Oral    Pharmacy: [facility]/pharmacy #[id number] - [place], [place] - [address] Ph #: [phone]       ibuprofen (ADVIL,MOTRIN) 600 MG tablet 30 tablet 0 2017     Take 1 tablet (600 mg total) by mouth every  6 (six) hours as needed for Pain. - Oral    Pharmacy: [facility]/pharmacy #[id number] - [place], [place] - [address] Ph #: [phone]       fluconazole (DIFLUCAN) 200 MG Tab 1 tablet 1 5/20/2017 5/20/2017    Take 1 tablet (200 mg total) by mouth once. - Oral    Pharmacy: [facility]/pharmacy #[id number] - [place], [place] - [address] Ph #: [phone]         [facility]     [facility] Care Line - 24/7 Assistance  Unless otherwise directed by your provider, please contact [facility], our nurse care line that is available for 24/7 assistance.     Registered nurses in the [facility] provide: appointment scheduling, clinical advisement, health education, and other advisory services.  Call: [phone] (toll free)               Medications           Message regarding Medications     Verify the changes and/or additions to your medication regime listed below are the same as discussed with your clinician today.  If any of these changes or additions are incorrect, please notify your healthcare provider.        START taking these NEW medications        Refills    sulfamethoxazole-trimethoprim 800-160mg (BACTRIM DS) 800-160 mg Tab 0    Sig: Take 2 tablets by mouth 2 (two) times daily.    Class: Print    Route: Oral    ibuprofen (ADVIL,MOTRIN) 600 MG tablet 0    Sig: Take 1 tablet (600 mg total) by mouth every 6 (six) hours as needed for Pain.    Class: Print    Route: Oral    fluconazole (DIFLUCAN) 200 MG Tab 1    Sig: Take 1 tablet (200 mg total) by mouth once.    Class: Print    Route: Oral      These medications were administered today        Dose Freq    lidocaine-EPINEPHrine 2%-1:100,000 injection 10 mL 10 mL ED 1 Time    Sig: Inject 10 mLs into the skin ED 1 Time.    Class: Normal    Route: Intradermal    Cosign for Ordering: Accepted by Bin Romero MD on 5/20/2017  2:49 PM    hydrocodone-acetaminophen 5-325mg per tablet 1 tablet 1 tablet ED 1 Time    Sig: Take 1 tablet  by mouth ED 1 Time.    Class: Normal    Route: Oral    Cosign for Ordering: Accepted by Bin Romero MD on 5/20/2017  2:49 PM    neomycin-bacitracnZn-polymyxnB packet  ED 1 Time    Sig: Apply topically ED 1 Time.    Class: Normal    Route: Topical (Top)    Cosign for Ordering: Accepted by Bin Romero MD on 5/20/2017  2:49 PM    clindamycin capsule 450 mg 450 mg Once    Sig: Take 3 capsules (450 mg total) by mouth once.    Class: Normal    Route: Oral    Cosign for Ordering: Accepted by Bin Romero MD on 5/20/2017  3:07 PM      STOP taking these medications     tramadol (ULTRAM) 50 mg tablet Take 50 mg by mouth 2 (two) times daily.           Verify that the below list of medications is an accurate representation of the medications you are currently taking.  If none reported, the list may be blank. If incorrect, please contact your healthcare provider. Carry this list with you in case of emergency.           Current Medications     fluconazole (DIFLUCAN) 200 MG Tab Take 1 tablet (200 mg total) by mouth once.    ibuprofen (ADVIL,MOTRIN) 600 MG tablet Take 1 tablet (600 mg total) by mouth every 6 (six) hours as needed for Pain.    metformin (GLUCOPHAGE) 1000 MG tablet     neomycin-polymyxin-hydrocortisone (CORTISPORIN) 3.5-10,000-1 mg/mL-unit/mL-% otic suspension Place 3 drops into both ears 3 (three) times daily.    pregabalin (LYRICA) 300 MG Cap Take 300 mg by mouth 3 (three) times daily.    sulfamethoxazole-trimethoprim 800-160mg (BACTRIM DS) 800-160 mg Tab Take 2 tablets by mouth 2 (two) times daily.    triamterene-hydrochlorothiazide 37.5-25 mg (DYAZIDE) 37.5-25 mg per capsule TAKE 1 CAPSULE DAILY ORAL FOR 30 DAYS    TR[employer]ERESULT BLOOD GLUCOSE SYSTM kit USE WITH TEST STRIPS THREE TIMES A DAY    TRUETEST TEST STRIPS Strp TEST BLOOD GLUCOSE THREE TIMES A DAY    ULTRA THIN LANCETS 30 gauge Misc USE TO TEST BLOOD GLUCOSE THREE TIMES A DAY    valacyclovir (VALTREX) 500 MG tablet Take 1 tablet (500 mg total)  "by mouth 2 (two) times daily.           Clinical Reference Information           Your Vitals Were     BP Pulse Temp Resp Height Weight    135/88 (BP Location: Left arm, Patient Position: Sitting) 93 98.1 °F (36.7 °C) (Oral) 18 5' 7.5" (1.715 m) 108 kg (238 lb)    Last Period SpO2 BMI          05/23/2013 97% 36.73 kg/m2        Allergies as of 5/20/2017     No Known Allergies      Immunizations Administered on Date of Encounter - 5/20/2017     None      ED Micro, Lab, POCT     None      ED Imaging Orders     None        Discharge Instructions         Extremity Laceration: Sutures, Staples, or Tape  A laceration is a cut through the skin. If it is deep, it may require stitches (sutures) or staples to close so it can heal. Minor cuts may be treated with surgical tape closures.   X-rays may be done if something may have entered the skin through the cut. You may also need a tetanus shot if you are not up to date on this vaccination.  Home care  · Follow the health care providers instructions on how to care for the cut.  · Wash your hands with soap and warm water before and after caring for your wound. This is to help prevent infection.  · Keep the wound clean and dry. If a bandage was applied and it becomes wet or dirty, replace it. Otherwise, leave it in place for the first 24 hours, then change it once a day or as directed.  · If sutures or staples were used, clean the wound daily:  · After removing the bandage, wash the area with soap and water. Use a wet cotton swab to loosen and remove any blood or crust that forms.  · After cleaning, keep the wound clean and dry. Talk with your doctor before applying any antibiotic ointment to the wound. Reapply the bandage.  · You may remove the bandage to shower as usual after the first 24 hours, but do not soak the area in water (no swimming) until the stitches or staples are removed.  · If surgical tape closures were used, keep the area clean and dry. If it becomes wet, blot it " dry with a towel.  · The doctor may prescribe an antibiotic cream or ointment to prevent infection. Do not stop taking this medication until you have finished the prescribed course or the doctor tells you to stop. The doctor may also prescribe medications for pain. Follow the doctors instructions for taking these medications.  · Avoid activities that may reopen your wound.  Follow-up care  Follow up with your health care provider. Most skin wounds heal within ten days. However, an infection may sometimes occur despite proper treatment. Therefore, check the wound daily for the signs of infection listed below. Stitches and staples should be removed within 7-14 days. If surgical tape closures were used, you may remove them after 10 days if they have not fallen off by then.   When to seek medical advice  Call your health care provider right away if any of these occur:  · Wound bleeding not controlled by direct pressure  · Signs of infection, including increasing pain in the wound, increasing wound redness or swelling, or pus or bad odor coming from the wound  · Fever of 100.4°F (38ºC) or higher or as directed by your healthcare provider  · Stitches or staples come apart or fall out or surgical tape falls off before 7 days  · Wound edges re-open  · Wound changes colors  · Numbness around the wound   · Decreased movement around the injured area  Date Last Reviewed: 6/14/2015 © 2000-2016 [employer]. [address] [place] [address], [place] [id number]. All rights reserved. This information is not intended as a substitute for professional medical care. Always follow your healthcare professional's instructions.          Discharge References/Attachments     LACERATION, HAND: ALL CLOSURES (ENGLISH)      Smoking Cessation     If you would like to quit smoking:   You may be eligible for free services if you are a [place] resident and started smoking cigarettes before September 1, 1988.  Call the Smoking Cessation Trust  (SCT) toll free at (813) 975-4949 or (611) 887-0445.   Call 1-800-QUIT-NOW if you do not meet the above criteria.   Contact us via email: [email or web address]   View our website for more information: [email or web address]         [facility] complies with applicable Federal civil rights laws and does not discriminate on the basis of race, color, national origin, age, disability, or sex.        Language Assistance Services     ATTENTION: Language assistance services are available, free of charge. Please call [phone].      ATENCIÓN: Si habla español, tiene a perrin disposición servicios gratuitos de asistencia lingüística. Llame al [phone].     CHÚ Ý: N?u b?n nói Ti?ng Vi?t, có các d?ch v? h? tr? ngôn ng? mi?n phí dành cho b?n. G?i s? [phone].

## 2017-05-20 NOTE — ED PROVIDER NOTES
Encounter Date: 2017       History     Chief Complaint   Patient presents with    Laceration     Laceration to L hand from razor.      Review of patient's allergies indicates:  No Known Allergies  HPI Comments: 31-year-old female with past medical history GERD, mental disorder, herpes simplex, asthma, hypertension, fibromyalgia, diabetes, since the ED with chief complaint laceration to dorsum of left hand just prior to arrival.  Patient was doing her makeup and she actually cut herself with her straight razor.  She denies lightheadedness or dizziness.  She denies loss of sensation or loss of motor control.  No radiculopathy or paresthesia.  Symptoms are constant.  No alleviating or exacerbating factors.     The history is provided by the patient.     Past Medical History:   Diagnosis Date    Asthma     as a child    Diabetes mellitus     Fibromyalgia     GERD (gastroesophageal reflux disease)     Herpes simplex without mention of complication     History of pulmonary embolus during pregnancy 2009    after her second  section    Hypertension     off meds x 1 year    Mental disorder     depression and migraine headaches     Past Surgical History:   Procedure Laterality Date     SECTION      DILATION AND CURETTAGE OF UTERUS      for elective abortions    [facility]  2012    Dr Jiang    HY[facility]CTOMY  2013    [facility]    marsupialization of Bartholin's cyst in  and 2005 and     TUBAL LIGATION  2009    uterine ablation  12     Family History   Problem Relation Age of Onset    Diabetes Mother     Hypertension Mother     Stroke Mother      Social History   Substance Use Topics    Smoking status: Former Smoker     Quit date: 2015    Smokeless tobacco: Never Used      Comment: One- 2  cigarettes  / day    Alcohol use 0.6 oz/week     1 Shots of liquor per week      Comment: occassionally     Review of Systems   Constitutional: Negative for activity change,  appetite change, chills and fever.   HENT: Negative.    Eyes: Negative.    Respiratory: Negative for cough, chest tightness, shortness of breath, wheezing and stridor.    Cardiovascular: Negative for chest pain.   Gastrointestinal: Negative for abdominal pain, diarrhea, nausea and vomiting.   Endocrine: Negative.    Genitourinary: Negative for difficulty urinating, flank pain and hematuria.   Musculoskeletal: Negative for back pain, myalgias, neck pain and neck stiffness.   Skin: Positive for wound. Negative for color change, pallor and rash.   Allergic/Immunologic: Negative.    Neurological: Negative for dizziness, syncope, weakness, light-headedness and headaches.   Hematological: Negative.    Psychiatric/Behavioral: Negative.    All other systems reviewed and are negative.      Physical Exam   Initial Vitals   BP Pulse Resp Temp SpO2   05/20/17 1426 05/20/17 1426 05/20/17 1426 05/20/17 1426 05/20/17 1426   135/88 93 18 98.1 °F (36.7 °C) 97 %     Physical Exam    Nursing note and vitals reviewed.  Constitutional: Vital signs are normal. She appears well-developed and well-nourished. She is not diaphoretic. She is cooperative.  Non-toxic appearance. She does not have a sickly appearance. She does not appear ill. No distress.   HENT:   Head: Normocephalic and atraumatic.   Eyes: Conjunctivae and EOM are normal. Pupils are equal, round, and reactive to light.   Neck: Normal range of motion. Neck supple. No tracheal deviation present.   Cardiovascular: Normal heart sounds.   Pulmonary/Chest: Breath sounds normal. No stridor. No respiratory distress. She has no wheezes. She has no rhonchi. She exhibits no tenderness.   Abdominal: Soft. Bowel sounds are normal. She exhibits no distension. There is no tenderness.   Musculoskeletal: Normal range of motion. She exhibits no tenderness.        Hands:  Laceration to dorsum of left hand.  3.5 cm, linear.  Small amount of skin avulsion.  Visible extensor tendon, bleeding  controlled.  No foreign body.  No hematoma.  No bony abnormality or tenderness.   Lymphadenopathy:     She has no cervical adenopathy.   Neurological: She is alert and oriented to person, place, and time. She has normal strength.   Skin: Skin is warm and dry. No rash and no abscess noted. No erythema.   Psychiatric: She has a normal mood and affect. Her behavior is normal. Judgment and thought content normal.         ED Course   Lac Repair  Date/Time: 5/20/2017 2:51 PM  Performed by: DONIS MARQUIS  Authorized by: MARYLOU ELIAS   Body area: upper extremity  Location details: left hand  Laceration length: 3 cm  Foreign bodies: no foreign bodies  Tendon involvement: superficial  Nerve involvement: superficial  Vascular damage: no  Anesthesia: local infiltration    Anesthesia:  Anesthesia: local infiltration  Local Anesthetic: lidocaine 2% with epinephrine   Anesthetic total: 8 mL  Patient sedated: no  Preparation: Patient was prepped and draped in the usual sterile fashion.  Irrigation solution: saline  Irrigation method: syringe  Amount of cleaning: standard  Debridement: minimal  Degree of undermining: none  Skin closure: 4-0 Prolene  Number of sutures: 6  Technique: simple  Approximation: close  Approximation difficulty: simple  Dressing: antibiotic ointment  Patient tolerance: Patient tolerated the procedure well with no immediate complications        Labs Reviewed - No data to display          Medical Decision Making:   Initial Assessment:   31-year-old female chief complaint laceration to left hand sustained prior to arrival  Differential Diagnosis:   Laceration, infected wound, folliculitis, abscess  ED Management:  Patient overall well-appearing, in no acute distress, afebrile, vitals within normal limits.  Patient's chief complaint is laceration to dorsum of left hand.  She does have an obvious 3 cm laceration to the dorsum, bleeding controlled, no foreign body.  She retains full range of motion of her  hand against resistance.  All digits cap refill less than 3 seconds.  2+ radial pulse.  No radiation of symptoms or radiculopathy.  Motor and sensation intact.  I do think this is amenable to primary repair.    Patient tolerated laceration repair without issue.  I've asked her to return to this emergency department if any signs of wound infection.  I've also asked her to return to this emergency Department for suture removal in 10-14 days.  She does not utilize Dr. Tellez as her PCP anymore.  Bactrim given to prevent infection.  Other:   I have discussed this case with another health care provider.       <> Summary of the Discussion: I have discussed this case with talented Dr. Romero.              Attending Attestation:     Physician Attestation Statement for NP/PA:   I have conducted a face to face encounter with this patient in addition to the NP/PA, due to NP/PA Request    Other NP/PA Attestation Additions:      Medical Decision Makin-year-old female presenting with laceration to the left hand.  Laceration repaired.  I agree with plan.                 ED Course     Clinical Impression:   The encounter diagnosis was Laceration of left hand without foreign body, initial encounter.    Disposition:   Disposition: Discharged  Condition: Stable  Improved, repaired.       Arturo Marshall PA-C  [phone]       Bin Romero MD  [phone]

## 2017-06-06 ENCOUNTER — HOSPITAL ENCOUNTER (EMERGENCY)
Facility: OTHER | Age: 32
Discharge: HOME | End: 2017-06-06
Attending: EMERGENCY MEDICINE
Payer: COMMERCIAL

## 2017-06-06 VITALS
BODY MASS INDEX: 37.67 KG/M2 | SYSTOLIC BLOOD PRESSURE: 135 MMHG | WEIGHT: 240 LBS | HEIGHT: 67 IN | RESPIRATION RATE: 16 BRPM | TEMPERATURE: 99 F | HEART RATE: 82 BPM | DIASTOLIC BLOOD PRESSURE: 94 MMHG | OXYGEN SATURATION: 97 %

## 2017-06-06 DIAGNOSIS — M54.9 MUSCULOSKELETAL BACK PAIN: Primary | ICD-10-CM
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
## 2017-06-06 PROCEDURE — 96372 THER/PROPH/DIAG INJ SC/IM: CPT

## 2017-06-06 PROCEDURE — 63600175 PHARM REV CODE 636 W HCPCS: Performed by: EMERGENCY MEDICINE

## 2017-06-06 PROCEDURE — 99283 EMERGENCY DEPT VISIT LOW MDM: CPT | Mod: 25

## 2017-06-06 RX ORDER — ORPHENADRINE CITRATE 30 MG/ML
60 INJECTION INTRAMUSCULAR; INTRAVENOUS
Status: COMPLETED | OUTPATIENT
Start: 2017-06-06 | End: 2017-06-06

## 2017-06-06 RX ORDER — HYDROCODONE BITARTRATE AND ACETAMINOPHEN 5; 325 MG/1; MG/1
1 TABLET ORAL EVERY 4 HOURS PRN
Qty: 6 TABLET | Refills: 0 | Status: SHIPPED | OUTPATIENT
Start: 2017-06-06 | End: 2017-10-25

## 2017-06-06 RX ORDER — METHOCARBAMOL 750 MG/1
1500 TABLET, FILM COATED ORAL 3 TIMES DAILY
Qty: 30 TABLET | Refills: 0 | Status: SHIPPED | OUTPATIENT
Start: 2017-06-06 | End: 2017-06-11

## 2017-06-06 RX ORDER — KETOROLAC TROMETHAMINE 30 MG/ML
30 INJECTION, SOLUTION INTRAMUSCULAR; INTRAVENOUS
Status: COMPLETED | OUTPATIENT
Start: 2017-06-06 | End: 2017-06-06

## 2017-06-06 RX ORDER — NAPROXEN 500 MG/1
500 TABLET ORAL 2 TIMES DAILY WITH MEALS
Qty: 20 TABLET | Refills: 0 | Status: SHIPPED | OUTPATIENT
Start: 2017-06-06 | End: 2017-10-25

## 2017-06-06 RX ADMIN — KETOROLAC TROMETHAMINE 30 MG: 30 INJECTION, SOLUTION INTRAMUSCULAR at 12:06

## 2017-06-06 RX ADMIN — ORPHENADRINE CITRATE 60 MG: 30 INJECTION INTRAMUSCULAR; INTRAVENOUS at 12:06
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
## 2017-09-08 DIAGNOSIS — B37.31 CANDIDA VAGINITIS: Primary | ICD-10-CM

## 2017-09-08 RX ORDER — FLUCONAZOLE 150 MG/1
150 TABLET ORAL ONCE
Qty: 1 TABLET | Refills: 0 | Status: SHIPPED | OUTPATIENT
Start: 2017-09-08 | End: 2017-09-08

## 2017-09-14 ENCOUNTER — TELEPHONE (OUTPATIENT)
Dept: OBSTETRICS AND GYNECOLOGY | Facility: CLINIC | Age: 32
End: 2017-09-14

## 2017-09-14 NOTE — TELEPHONE ENCOUNTER
----- Message from Oneyda Tiwari sent at 9/14/2017 11:15 AM CDT -----  Contact: [phone]  Pt is following up on the message from yesterday Please call pt at your earliest convenience.  Thanks !    Patients mother called stating her daughter will cancel the date scheduled for her surgery due to homecoming. Sal

## 2017-10-25 ENCOUNTER — OFFICE VISIT (OUTPATIENT)
Dept: OBSTETRICS AND GYNECOLOGY | Facility: CLINIC | Age: 32
End: 2017-10-25
Payer: MEDICAID

## 2017-10-25 VITALS
HEIGHT: 67 IN | DIASTOLIC BLOOD PRESSURE: 72 MMHG | TEMPERATURE: 99 F | WEIGHT: 238.56 LBS | BODY MASS INDEX: 37.44 KG/M2 | SYSTOLIC BLOOD PRESSURE: 126 MMHG

## 2017-10-25 DIAGNOSIS — Z01.419 WELL WOMAN EXAM WITH ROUTINE GYNECOLOGICAL EXAM: Primary | ICD-10-CM

## 2017-10-25 PROCEDURE — 99213 OFFICE O/P EST LOW 20 MIN: CPT | Mod: PBBFAC | Performed by: OBSTETRICS & GYNECOLOGY

## 2017-10-25 PROCEDURE — 99395 PREV VISIT EST AGE 18-39: CPT | Mod: S$PBB,,, | Performed by: OBSTETRICS & GYNECOLOGY

## 2017-10-25 PROCEDURE — 99999 PR PBB SHADOW E&M-EST. PATIENT-LVL III: CPT | Mod: PBBFAC,,, | Performed by: OBSTETRICS & GYNECOLOGY

## 2017-10-25 PROCEDURE — 88175 CYTOPATH C/V AUTO FLUID REDO: CPT

## 2017-10-25 RX ORDER — GABAPENTIN 600 MG/1
TABLET ORAL
COMMUNITY
Start: 2017-09-19 | End: 2019-02-04

## 2017-10-25 RX ORDER — MELOXICAM 15 MG/1
TABLET ORAL
COMMUNITY
Start: 2017-09-19 | End: 2018-02-06

## 2017-10-25 RX ORDER — PANTOPRAZOLE SODIUM 20 MG/1
20 TABLET, DELAYED RELEASE ORAL DAILY
COMMUNITY
Start: 2017-09-27 | End: 2019-09-05

## 2017-10-25 RX ORDER — LACTULOSE 10 G/15ML
SOLUTION ORAL; RECTAL
Refills: 5 | COMMUNITY
Start: 2017-08-29 | End: 2018-10-30 | Stop reason: CLARIF

## 2017-10-25 RX ORDER — ZOLPIDEM TARTRATE 10 MG/1
TABLET ORAL
COMMUNITY
Start: 2017-10-20 | End: 2018-02-06

## 2017-10-25 RX ORDER — PREDNISONE 20 MG/1
20 TABLET ORAL DAILY
Refills: 0 | COMMUNITY
Start: 2017-07-19 | End: 2018-02-06 | Stop reason: SDDI

## 2017-11-17 ENCOUNTER — TELEPHONE (OUTPATIENT)
Dept: OBSTETRICS AND GYNECOLOGY | Facility: CLINIC | Age: 32
End: 2017-11-17

## 2017-11-17 DIAGNOSIS — B37.31 CANDIDA VAGINITIS: Primary | ICD-10-CM

## 2017-11-17 RX ORDER — FLUCONAZOLE 150 MG/1
150 TABLET ORAL DAILY
Qty: 1 TABLET | Refills: 0 | Status: SHIPPED | OUTPATIENT
Start: 2017-11-17 | End: 2017-11-18

## 2017-11-17 NOTE — TELEPHONE ENCOUNTER
----- Message from Elizabeth Elmore LPN sent at 11/17/2017 10:09 AM CST -----  Contact: self  Patient states she has a yeast infection and [facility].  ----- Message -----  From: Carlita Yue  Sent: 11/17/2017   9:48 AM  To: Apolinar GONZALEZ Staff    Pt is asking for a call back in regards to something personal. Pt call back [phone]

## 2018-02-06 ENCOUNTER — OFFICE VISIT (OUTPATIENT)
Dept: OBSTETRICS AND GYNECOLOGY | Facility: CLINIC | Age: 33
End: 2018-02-06
Payer: MEDICAID

## 2018-02-06 VITALS
HEIGHT: 67 IN | TEMPERATURE: 99 F | DIASTOLIC BLOOD PRESSURE: 68 MMHG | WEIGHT: 232.13 LBS | BODY MASS INDEX: 36.43 KG/M2 | SYSTOLIC BLOOD PRESSURE: 120 MMHG

## 2018-02-06 DIAGNOSIS — E11.9 CONTROLLED TYPE 2 DIABETES MELLITUS WITHOUT COMPLICATION, WITHOUT LONG-TERM CURRENT USE OF INSULIN: ICD-10-CM

## 2018-02-06 DIAGNOSIS — N89.8 VAGINAL IRRITATION: ICD-10-CM

## 2018-02-06 DIAGNOSIS — N89.8 VAGINAL DISCHARGE: Primary | ICD-10-CM

## 2018-02-06 DIAGNOSIS — R23.2 HOT FLASHES: ICD-10-CM

## 2018-02-06 PROBLEM — H60.90 OTITIS EXTERNA: Status: RESOLVED | Noted: 2017-04-28 | Resolved: 2018-02-06

## 2018-02-06 PROBLEM — H60.501 ACUTE OTITIS EXTERNA OF RIGHT EAR: Status: RESOLVED | Noted: 2017-04-28 | Resolved: 2018-02-06

## 2018-02-06 PROBLEM — H66.91 RIGHT OTITIS MEDIA: Status: RESOLVED | Noted: 2017-04-28 | Resolved: 2018-02-06

## 2018-02-06 PROCEDURE — 87480 CANDIDA DNA DIR PROBE: CPT

## 2018-02-06 PROCEDURE — 3008F BODY MASS INDEX DOCD: CPT | Mod: ,,, | Performed by: OBSTETRICS & GYNECOLOGY

## 2018-02-06 PROCEDURE — 99999 PR PBB SHADOW E&M-EST. PATIENT-LVL III: CPT | Mod: PBBFAC,,, | Performed by: OBSTETRICS & GYNECOLOGY

## 2018-02-06 PROCEDURE — 99213 OFFICE O/P EST LOW 20 MIN: CPT | Mod: PBBFAC | Performed by: OBSTETRICS & GYNECOLOGY

## 2018-02-06 PROCEDURE — 87491 CHLMYD TRACH DNA AMP PROBE: CPT

## 2018-02-06 PROCEDURE — 99213 OFFICE O/P EST LOW 20 MIN: CPT | Mod: S$PBB,,, | Performed by: OBSTETRICS & GYNECOLOGY

## 2018-02-06 RX ORDER — VALACYCLOVIR HYDROCHLORIDE 500 MG/1
TABLET, FILM COATED ORAL
COMMUNITY
Start: 2018-01-19 | End: 2018-06-11 | Stop reason: SDUPTHER

## 2018-02-06 RX ORDER — LORATADINE 10 MG/1
TABLET ORAL
COMMUNITY
Start: 2018-01-23

## 2018-02-06 RX ORDER — METRONIDAZOLE 500 MG/1
500 TABLET ORAL EVERY 12 HOURS
Qty: 14 TABLET | Refills: 0 | Status: SHIPPED | OUTPATIENT
Start: 2018-02-06 | End: 2018-02-20

## 2018-02-06 NOTE — PROGRESS NOTES
"  Subjective:       Patient ID: Dimas Aguayo is a 32 y.o. female.    Chief Complaint:  Vaginal Discharge      History of Present Illness  HPI  Patient comes in today with multiple complaints.  1.  Again with vaginal discharge and vulva irritation.  She has been exercising, trying to lose weight.  Soaking in epsom salt and alcohol for sore muscles.  Developed irritation and vaginal discharge and odor.  New partner for the past several months.  Worried.  2.  "Something is off"  She has had occasional hot flashes.  Then cold and clammy in early morning.  Irritable.  Not sure if she is menopausal.  Would like testing.  Has not been checking her glucose.  Not taking medication for diabetes.      GYN & OB History  Patient's last menstrual period was 2013.   Date of Last Pap: 10/30/2017    OB History    Para Term  AB Living   3 2 2   1 2   SAB TAB Ectopic Multiple Live Births           2      # Outcome Date GA Lbr Doug/2nd Weight Sex Delivery Anes PTL Lv   3 Term 09 40w0d  3.714 kg (8 lb 3 oz) M Vag-Spont EPI N CLIVE   2 AB 2006           1 Term 01 40w0d  3.657 kg (8 lb 1 oz) F Vag-Spont EPI N CLIVE        Past Medical History:   Diagnosis Date    Asthma     as a child    Back pain, chronic 2013    " i have herinated disk"     Diabetes mellitus     Fibromyalgia     GERD (gastroesophageal reflux disease)     Herpes simplex without mention of complication     History of pulmonary embolus during pregnancy 2009    after her second  section    Hypertension     off meds x 1 year    Mental disorder     depression and migraine headaches       Past Surgical History:   Procedure Laterality Date     SECTION      DILATION AND CURETTAGE OF UTERUS      for elective abortions    [facility]  2012    Dr Jiang    [facility]    marsupialization of Bartholin's cyst in  and 2005 and 2005    TUBAL LIGATION  2009    uterine ablation  12 "       Family History   Problem Relation Age of Onset    Diabetes Mother     Hypertension Mother     Stroke Mother        Social History     Social History    Marital status: Single     Spouse name: N/A    Number of children: N/A    Years of education: N/A     Social History Main Topics    Smoking status: Former Smoker     Quit date: 1/1/2015    Smokeless tobacco: Never Used      Comment: One- 2  cigarettes  / day    Alcohol use 0.6 oz/week     1 Shots of liquor per week      Comment: occassionally    Drug use: No    Sexual activity: Yes     Partners: Male     Birth control/ protection: Surgical     Other Topics Concern    None     Social History Narrative    Working as a make-up artist    New partner for the past two 2014       Current Outpatient Prescriptions   Medication Sig Dispense Refill    gabapentin (NEURONTIN) 600 MG tablet       lactulose (CHRONULAC) 10 gram/15 mL solution TAKE 30 ML BY MOUTH TWICE DAILY  5    loratadine (CLARITIN) 10 mg tablet       metformin (GLUCOPHAGE) 1000 MG tablet       pantoprazole (PROTONIX) 20 MG tablet       pregabalin (LYRICA) 300 MG Cap Take 300 mg by mouth 3 (three) times daily.      triamterene-hydrochlorothiazide 37.5-25 mg (DYAZIDE) 37.5-25 mg per capsule TAKE 1 CAPSULE DAILY ORAL FOR 30 DAYS  5    TRUERESULT BLOOD GLUCOSE SYSTM kit USE WITH TEST STRIPS THREE TIMES A DAY  0    TRUETEST TEST STRIPS Strp TEST BLOOD GLUCOSE THREE TIMES A DAY  5    ULTRA THIN LANCETS 30 gauge Misc USE TO TEST BLOOD GLUCOSE THREE TIMES A DAY  5    valACYclovir (VALTREX) 500 MG tablet       metroNIDAZOLE (FLAGYL) 500 MG tablet Take 1 tablet (500 mg total) by mouth every 12 (twelve) hours. 14 tablet 0     No current facility-administered medications for this visit.        Review of patient's allergies indicates:  No Known Allergies    Review of Systems  Review of Systems   Constitutional: Positive for fatigue. Negative for activity change, appetite change, chills, fever and  unexpected weight change.   HENT: Negative for mouth sores.    Respiratory: Negative for cough, shortness of breath and wheezing.    Cardiovascular: Negative for chest pain and palpitations.   Gastrointestinal: Negative for abdominal pain, bloating, blood in stool, constipation, nausea and vomiting.   Endocrine: Positive for hot flashes. Negative for diabetes.        Cold and clammy   Genitourinary: Positive for vaginal discharge and vaginal odor. Negative for dyspareunia, dysuria, frequency, hematuria, menorrhagia, menstrual problem, pelvic pain, urgency, vaginal bleeding, vaginal pain, dysmenorrhea, urinary incontinence and postcoital bleeding.   Musculoskeletal: Negative for back pain and myalgias.   Skin:  Negative for rash.   Neurological: Negative for seizures and headaches.   Psychiatric/Behavioral: Negative for depression and sleep disturbance. The patient is nervous/anxious.    Breast: Negative for breast mass, breast pain and nipple discharge          Objective:    Physical Exam:   Constitutional: She appears well-developed and well-nourished. No distress.    HENT:   Head: Normocephalic and atraumatic.    Eyes: EOM are normal.    Neck: Normal range of motion.     Pulmonary/Chest: Effort normal. No respiratory distress.   Breasts: Non-tender, no engorgement, no masses, no retraction, no discharge. Negative for lymphadenopathy.         Abdominal: Soft. She exhibits no distension. There is no tenderness. There is no rebound and no guarding.     Genitourinary: Vagina normal. No vaginal discharge found.   Genitourinary Comments: Vulva without any obvious lesions.  Vaginal vault with good support.  Minimal white discharge noted.  No obvious lesion.  Cervix is without any cervical motion tenderness.  No obvious lesion.  Uterus is surgically absent.  Adnexa is without any masses or tenderness.           Musculoskeletal: Normal range of motion.       Neurological: She is alert.    Skin: Skin is warm and dry.   "  Psychiatric: She has a normal mood and affect.          Assessment:        1. Vaginal discharge    2. Vaginal irritation    3. Hot flashes              Plan:      I have extensively discussed with the patient regarding her condition  Gonorrhea, chlamydia and the Affirm test performed  Metronidazole prescribed per request    She is fasting for the week, "just liquid diet"  However, she states that her symptoms were before the fast    CBC, CMP, Hemoglobin A1c, TSH, vitamin D next week  Back in about 2-3 weeks    Needs to eat as she has diabetes.     "

## 2018-02-07 LAB
C TRACH DNA SPEC QL NAA+PROBE: NOT DETECTED
CANDIDA RRNA VAG QL PROBE: NEGATIVE
G VAGINALIS RRNA GENITAL QL PROBE: POSITIVE
N GONORRHOEA DNA SPEC QL NAA+PROBE: NOT DETECTED
T VAGINALIS RRNA GENITAL QL PROBE: NEGATIVE

## 2018-02-16 ENCOUNTER — LAB VISIT (OUTPATIENT)
Dept: LAB | Facility: HOSPITAL | Age: 33
End: 2018-02-16
Attending: OBSTETRICS & GYNECOLOGY
Payer: MEDICAID

## 2018-02-16 DIAGNOSIS — R23.2 HOT FLASHES: ICD-10-CM

## 2018-02-16 LAB
25(OH)D3+25(OH)D2 SERPL-MCNC: 12 NG/ML
ALBUMIN SERPL BCP-MCNC: 4.5 G/DL
ALP SERPL-CCNC: 53 U/L
ALT SERPL W/O P-5'-P-CCNC: 62 U/L
ANION GAP SERPL CALC-SCNC: 11 MMOL/L
AST SERPL-CCNC: 24 U/L
BASOPHILS # BLD AUTO: 0.03 K/UL
BASOPHILS NFR BLD: 0.5 %
BILIRUB SERPL-MCNC: 0.4 MG/DL
BUN SERPL-MCNC: 10 MG/DL
CALCIUM SERPL-MCNC: 10.5 MG/DL
CHLORIDE SERPL-SCNC: 101 MMOL/L
CO2 SERPL-SCNC: 25 MMOL/L
CREAT SERPL-MCNC: 0.9 MG/DL
DIFFERENTIAL METHOD: NORMAL
EOSINOPHIL # BLD AUTO: 0.3 K/UL
EOSINOPHIL NFR BLD: 4.2 %
ERYTHROCYTE [DISTWIDTH] IN BLOOD BY AUTOMATED COUNT: 13 %
EST. GFR  (AFRICAN AMERICAN): >60 ML/MIN/1.73 M^2
EST. GFR  (NON AFRICAN AMERICAN): >60 ML/MIN/1.73 M^2
ESTIMATED AVG GLUCOSE: 117 MG/DL
GLUCOSE SERPL-MCNC: 131 MG/DL
HBA1C MFR BLD HPLC: 5.7 %
HCT VFR BLD AUTO: 44.2 %
HGB BLD-MCNC: 14.9 G/DL
LYMPHOCYTES # BLD AUTO: 2.9 K/UL
LYMPHOCYTES NFR BLD: 47.5 %
MCH RBC QN AUTO: 29.4 PG
MCHC RBC AUTO-ENTMCNC: 33.7 G/DL
MCV RBC AUTO: 87 FL
MONOCYTES # BLD AUTO: 0.4 K/UL
MONOCYTES NFR BLD: 6.3 %
NEUTROPHILS # BLD AUTO: 2.6 K/UL
NEUTROPHILS NFR BLD: 41.3 %
PLATELET # BLD AUTO: 247 K/UL
PMV BLD AUTO: 12.2 FL
POTASSIUM SERPL-SCNC: 4.2 MMOL/L
PROT SERPL-MCNC: 8.2 G/DL
RBC # BLD AUTO: 5.06 M/UL
SODIUM SERPL-SCNC: 137 MMOL/L
WBC # BLD AUTO: 6.17 K/UL

## 2018-02-16 PROCEDURE — 85025 COMPLETE CBC W/AUTO DIFF WBC: CPT

## 2018-02-16 PROCEDURE — 82306 VITAMIN D 25 HYDROXY: CPT

## 2018-02-16 PROCEDURE — 80053 COMPREHEN METABOLIC PANEL: CPT

## 2018-02-16 PROCEDURE — 83036 HEMOGLOBIN GLYCOSYLATED A1C: CPT

## 2018-02-16 PROCEDURE — 36415 COLL VENOUS BLD VENIPUNCTURE: CPT

## 2018-06-11 DIAGNOSIS — B00.9 HERPES INFECTION: Primary | ICD-10-CM

## 2018-06-11 RX ORDER — VALACYCLOVIR HYDROCHLORIDE 500 MG/1
500 TABLET, FILM COATED ORAL 2 TIMES DAILY
Qty: 30 TABLET | Refills: 1 | Status: SHIPPED | OUTPATIENT
Start: 2018-06-11 | End: 2019-07-10 | Stop reason: SDUPTHER

## 2018-06-11 NOTE — TELEPHONE ENCOUNTER
----- Message from Carlita Yue sent at 6/11/2018  3:10 PM CDT -----  Contact: self  Refill request for --- Valtrex--- [facility] [address]   [phone]  ------------------------------------------------------------------------  6/11/18 @ 1516 ([facility])  PT IS REQUEST REFILL FOR VALTREX FOR HERPES OUTBREAK, LAST O.V. WITH DR STONE ON 2/6/18.     MESSAGE FORWARDED TO DR STONE

## 2018-10-24 ENCOUNTER — TELEPHONE (OUTPATIENT)
Dept: OBSTETRICS AND GYNECOLOGY | Facility: CLINIC | Age: 33
End: 2018-10-24

## 2018-10-24 NOTE — TELEPHONE ENCOUNTER
----- Message from Opal Landrum sent at 10/24/2018  2:36 PM CDT -----  Contact: Self/ [phone]  Pt calling to say she has a yeast infection. Requesting refill for Diflucan to [facility] on [address]. Thank you.    [facility]/pharmacy #[id number] - [place], [place] - [address] [facility]  [address] [facility] [address]  Phone: [phone] Fax: [phone]  -------------------------------------------------------  10/24/18 @ 3955 ([facility])  SPOKE WITH MS [place]ODORE INFORMED HER THAT DR STONE WOULD HAVE TO SEE HER SINCE SHE HAS NOT BEEN SEEN SINCE FEB 2018 , APPT MADE FOR 10/30/18 @ 6130 , INFORMED PT THAT IF SHE CAN NOT WAIT SHE CAN GO TO [facility]  ON [address] IS OPEN FROM 8-8 M-F AND 9-5 ON SAT & SUN

## 2018-10-30 ENCOUNTER — OFFICE VISIT (OUTPATIENT)
Dept: OBSTETRICS AND GYNECOLOGY | Facility: CLINIC | Age: 33
End: 2018-10-30
Payer: MEDICAID

## 2018-10-30 VITALS
TEMPERATURE: 99 F | DIASTOLIC BLOOD PRESSURE: 68 MMHG | SYSTOLIC BLOOD PRESSURE: 122 MMHG | WEIGHT: 232.81 LBS | HEIGHT: 67 IN | BODY MASS INDEX: 36.54 KG/M2

## 2018-10-30 DIAGNOSIS — R39.15 URGENCY OF URINATION: Primary | ICD-10-CM

## 2018-10-30 DIAGNOSIS — N89.8 VAGINAL DISCHARGE: ICD-10-CM

## 2018-10-30 DIAGNOSIS — N89.8 VAGINAL IRRITATION: ICD-10-CM

## 2018-10-30 LAB
BILIRUB SERPL-MCNC: NEGATIVE MG/DL
BLOOD URINE, POC: NEGATIVE
COLOR, POC UA: YELLOW
GLUCOSE UR QL STRIP: NORMAL
KETONES UR QL STRIP: NEGATIVE
LEUKOCYTE ESTERASE URINE, POC: NEGATIVE
NITRITE, POC UA: NORMAL
PH, POC UA: 5
PROTEIN, POC: NEGATIVE
SPECIFIC GRAVITY, POC UA: 1.01
UROBILINOGEN, POC UA: NORMAL

## 2018-10-30 PROCEDURE — 99213 OFFICE O/P EST LOW 20 MIN: CPT | Mod: PBBFAC | Performed by: OBSTETRICS & GYNECOLOGY

## 2018-10-30 PROCEDURE — 87660 TRICHOMONAS VAGIN DIR PROBE: CPT

## 2018-10-30 PROCEDURE — 99999 PR PBB SHADOW E&M-EST. PATIENT-LVL III: CPT | Mod: PBBFAC,,, | Performed by: OBSTETRICS & GYNECOLOGY

## 2018-10-30 PROCEDURE — 99213 OFFICE O/P EST LOW 20 MIN: CPT | Mod: S$PBB,,, | Performed by: OBSTETRICS & GYNECOLOGY

## 2018-10-30 PROCEDURE — 81002 URINALYSIS NONAUTO W/O SCOPE: CPT | Mod: PBBFAC | Performed by: OBSTETRICS & GYNECOLOGY

## 2018-10-30 RX ORDER — LIRAGLUTIDE 6 MG/ML
INJECTION SUBCUTANEOUS
Refills: 3 | COMMUNITY
Start: 2018-10-10

## 2018-10-30 RX ORDER — LOSARTAN POTASSIUM AND HYDROCHLOROTHIAZIDE 12.5; 5 MG/1; MG/1
1 TABLET ORAL DAILY
Refills: 3 | COMMUNITY
Start: 2018-10-02 | End: 2019-09-05

## 2018-10-30 NOTE — PROGRESS NOTES
"  Subjective:       Patient ID: Dimas Aguayo is a 33 y.o. female.    Chief Complaint:  Vaginitis      History of Present Illness  HPI  Patient comes in today again complaining of vulva and vagina irritation with possible discharge  No new partner.  Denies fever or chills.  No nausea or vomiting.  No significant pelvic pain  Some pelvic pressure with urinary frequency.    History of supracervical abdominal hysterectomy with significant adhesiolysis.  Diabetes mellitus on Victoza.  History of pulmonary embolus with her last  section.      GYN & OB History  Patient's last menstrual period was 2013.   Date of Last Pap: 10/30/2017    OB History    Para Term  AB Living   3 2 2   1 2   SAB TAB Ectopic Multiple Live Births           2      # Outcome Date GA Lbr Doug/2nd Weight Sex Delivery Anes PTL Lv   3 Term 09 40w0d  3.714 kg (8 lb 3 oz) M Vag-Spont EPI N CLIVE   2 AB 2006           1 Term 01 40w0d  3.657 kg (8 lb 1 oz) F Vag-Spont EPI N CLIVE        Past Medical History:   Diagnosis Date    Asthma     as a child    Back pain, chronic 2013    " i have herinated disk"     Diabetes mellitus     Fibromyalgia     GERD (gastroesophageal reflux disease)     Herpes simplex without mention of complication     History of pulmonary embolus during pregnancy     after her second  section    Hypertension     off meds x 1 year    Mental disorder     depression and migraine headaches       Past Surgical History:   Procedure Laterality Date    ABLATION, ENDOMETRIUM, THERMAL N/A 2012    [facility]     SECTION      DILATION AND CURETTAGE OF UTERUS      for elective abortions    HTA  2012    Dr Jiang    HYSTERECTOMY  2013    [facility]    HYSTERECTOMY, SUPRACERVICAL N/A 2013    [facility]    HYSTEROSCOPY, WITH DILATION AND CURETTAGE OF UTERUS  2012    Performed by [facility]qas Jiang, " [facility] at [facility]    INSERTION-FILTER-INFERIOR VENA CAVA Right 6/4/2013    [facility] Konstantin [facility] at [facility] OR    marsupialization of Bartholin's cyst in 2004 and 2005 2004 and 2005    SALPINGECTOMY Left 6/19/2013    [facility]    TUBAL LIGATION  2009    uterine ablation  12/21/12       Family History   Problem Relation Age of Onset    Diabetes Mother     Hypertension Mother     Stroke Mother        Social History     Socioeconomic History    Marital status: Single     Spouse name: None    Number of children: None    Years of education: None    Highest education level: None   Social Needs    Financial resource strain: None    Food insecurity - worry: None    Food insecurity - inability: None    Transportation needs - medical: None    Transportation needs - non-medical: None   Occupational History    None   Tobacco Use    Smoking status: Former Smoker     Last attempt to quit: 1/1/2015     Years since quitting: 3.8    Smokeless tobacco: Never Used    Tobacco comment: One- 2  cigarettes  / day   Substance and Sexual Activity    Alcohol use: Yes     Alcohol/week: 0.6 oz     Types: 1 Shots of liquor per week     Comment: occassionally    Drug use: No    Sexual activity: Yes     Partners: Male     Birth control/protection: Surgical   Other Topics Concern    None   Social History Narrative    Working as a make-up artist    New partner for the past two 2014       Current Outpatient Medications   Medication Sig Dispense Refill    gabapentin (NEURONTIN) 600 MG tablet       loratadine (CLARITIN) 10 mg tablet       losartan-hydrochlorothiazide 50-12.5 mg (HYZAAR) 50-12.5 mg per tablet Take 1 tablet by mouth once daily.  3    pantoprazole (PROTONIX) 20 MG tablet       pregabalin (LYRICA) 300 MG Cap Take 300 mg by mouth 3 (three) times daily.      TRUERESULT BLOOD GLUCOSE SYSTM kit USE WITH TEST STRIPS THREE TIMES A DAY  0    TRUETEST TEST STRIPS Strp TEST BLOOD  GLUCOSE THREE TIMES A DAY  5    ULTRA THIN LANCETS 30 gauge Misc USE TO TEST BLOOD GLUCOSE THREE TIMES A DAY  5    valACYclovir (VALTREX) 500 MG tablet Take 1 tablet (500 mg total) by mouth 2 (two) times daily. 30 tablet 1    VICTOZA 3-GIANNA 0.6 mg/0.1 mL (18 mg/3 mL) PnIj INJECT 1.8 MG EVERY DAY BY SUBCUTANEOUS ROUTE.  3     No current facility-administered medications for this visit.        Review of patient's allergies indicates:  No Known Allergies    Review of Systems  Review of Systems   Constitutional: Negative for activity change, appetite change, chills, fatigue, fever and unexpected weight change.   HENT: Negative for mouth sores.    Respiratory: Negative for cough, shortness of breath and wheezing.    Cardiovascular: Negative for chest pain and palpitations.   Gastrointestinal: Negative for abdominal pain, bloating, blood in stool, constipation, nausea and vomiting.   Endocrine: Negative for diabetes and hot flashes.        Cold and clammy   Genitourinary: Negative for dyspareunia, dysuria, frequency, hematuria, menorrhagia, menstrual problem, pelvic pain, urgency, vaginal bleeding, vaginal discharge, vaginal pain, dysmenorrhea, urinary incontinence, postcoital bleeding and vaginal odor.        Vulva and vagina irritation   Musculoskeletal: Negative for back pain and myalgias.   Skin:  Negative for rash.   Neurological: Negative for seizures and headaches.   Psychiatric/Behavioral: Negative for depression and sleep disturbance. The patient is not nervous/anxious.    Breast: Negative for breast mass, breast pain and nipple discharge          Objective:    Physical Exam:   Constitutional: She appears well-developed and well-nourished. No distress.    HENT:   Head: Normocephalic and atraumatic.    Eyes: EOM are normal.    Neck: Normal range of motion.     Pulmonary/Chest: Effort normal. No respiratory distress.   Breasts: Non-tender, no engorgement, no masses, no retraction, no discharge. Negative for  lymphadenopathy.         Abdominal: Soft. She exhibits no distension. There is no tenderness. There is no rebound and no guarding.   Pfannenstiel scar      Genitourinary: Vagina normal. No vaginal discharge found.   Genitourinary Comments: No atrophy.  Vulva without any obvious lesions.  Vaginal vault with good support.  Minimal white discharge noted.  No obvious lesion other than being dry.  Vaginal cuff intact and well-supported.  Cervix and Uterus are surgically absent.  Adnexa is without any masses or tenderness.           Musculoskeletal: Normal range of motion.       Neurological: She is alert.    Skin: Skin is warm and dry.    Psychiatric: She has a normal mood and affect.        Urine dipstick negative  Assessment:        1. Urgency of urination    2. Vaginal discharge    3. Vaginal irritation              Plan:      I have extensively discussed with the patient regarding her condition  Affirm test performed   Patient was discouraged from douching  OK to use ReFresh over-the-counter.    Back in about 3 months for her annual visit or as needed.

## 2018-10-31 DIAGNOSIS — B96.89 BACTERIAL VAGINOSIS: Primary | ICD-10-CM

## 2018-10-31 DIAGNOSIS — N76.0 BACTERIAL VAGINOSIS: Primary | ICD-10-CM

## 2018-10-31 LAB
CANDIDA RRNA VAG QL PROBE: NEGATIVE
G VAGINALIS RRNA GENITAL QL PROBE: POSITIVE
T VAGINALIS RRNA GENITAL QL PROBE: NEGATIVE

## 2018-10-31 RX ORDER — METRONIDAZOLE 500 MG/1
500 TABLET ORAL EVERY 12 HOURS
Qty: 14 TABLET | Refills: 0 | Status: SHIPPED | OUTPATIENT
Start: 2018-10-31 | End: 2018-11-07

## 2018-11-21 DIAGNOSIS — M51.26 DISPLACEMENT OF LUMBAR INTERVERTEBRAL DISC WITHOUT MYELOPATHY: Primary | ICD-10-CM

## 2019-01-06 ENCOUNTER — HOSPITAL ENCOUNTER (EMERGENCY)
Facility: HOSPITAL | Age: 34
Discharge: HOME | End: 2019-01-06
Attending: EMERGENCY MEDICINE
Payer: MEDICAID

## 2019-01-06 VITALS
RESPIRATION RATE: 18 BRPM | SYSTOLIC BLOOD PRESSURE: 141 MMHG | OXYGEN SATURATION: 98 % | TEMPERATURE: 99 F | HEART RATE: 74 BPM | BODY MASS INDEX: 34.56 KG/M2 | HEIGHT: 68 IN | WEIGHT: 228 LBS | DIASTOLIC BLOOD PRESSURE: 72 MMHG

## 2019-01-06 DIAGNOSIS — Z87.19 HISTORY OF GASTROESOPHAGEAL REFLUX (GERD): ICD-10-CM

## 2019-01-06 DIAGNOSIS — R11.0 NAUSEA: ICD-10-CM

## 2019-01-06 DIAGNOSIS — R10.30 LOWER ABDOMINAL PAIN: Primary | ICD-10-CM

## 2019-01-06 DIAGNOSIS — Z86.19 HISTORY OF HELICOBACTER PYLORI INFECTION: ICD-10-CM

## 2019-01-06 DIAGNOSIS — Z87.19 HISTORY OF IBS: ICD-10-CM

## 2019-01-06 DIAGNOSIS — B37.31 VAGINAL YEAST INFECTION: ICD-10-CM

## 2019-01-06 LAB
ALBUMIN SERPL BCP-MCNC: 4.2 G/DL
ALP SERPL-CCNC: 52 U/L
ALT SERPL W/O P-5'-P-CCNC: 23 U/L
ANION GAP SERPL CALC-SCNC: 9 MMOL/L
AST SERPL-CCNC: 21 U/L
B-HCG UR QL: NEGATIVE
BACTERIA GENITAL QL WET PREP: ABNORMAL
BASOPHILS # BLD AUTO: 0.04 K/UL
BASOPHILS NFR BLD: 0.6 %
BILIRUB SERPL-MCNC: 0.4 MG/DL
BILIRUB UR QL STRIP: NEGATIVE
BUN SERPL-MCNC: 8 MG/DL
CALCIUM SERPL-MCNC: 9.9 MG/DL
CHLORIDE SERPL-SCNC: 104 MMOL/L
CLARITY UR: CLEAR
CLUE CELLS VAG QL WET PREP: ABNORMAL
CO2 SERPL-SCNC: 27 MMOL/L
COLOR UR: YELLOW
CREAT SERPL-MCNC: 0.8 MG/DL
CTP QC/QA: YES
DIFFERENTIAL METHOD: NORMAL
EOSINOPHIL # BLD AUTO: 0.2 K/UL
EOSINOPHIL NFR BLD: 3.5 %
ERYTHROCYTE [DISTWIDTH] IN BLOOD BY AUTOMATED COUNT: 13 %
EST. GFR  (AFRICAN AMERICAN): >60 ML/MIN/1.73 M^2
EST. GFR  (NON AFRICAN AMERICAN): >60 ML/MIN/1.73 M^2
FILAMENT FUNGI VAG WET PREP-#/AREA: ABNORMAL
GLUCOSE SERPL-MCNC: 91 MG/DL
GLUCOSE UR QL STRIP: NEGATIVE
HCT VFR BLD AUTO: 42.2 %
HGB BLD-MCNC: 14.4 G/DL
HGB UR QL STRIP: NEGATIVE
KETONES UR QL STRIP: NEGATIVE
LEUKOCYTE ESTERASE UR QL STRIP: NEGATIVE
LIPASE SERPL-CCNC: 47 U/L
LYMPHOCYTES # BLD AUTO: 2.7 K/UL
LYMPHOCYTES NFR BLD: 42.2 %
MCH RBC QN AUTO: 30.3 PG
MCHC RBC AUTO-ENTMCNC: 34.1 G/DL
MCV RBC AUTO: 89 FL
MICROSCOPIC COMMENT: NORMAL
MONOCYTES # BLD AUTO: 0.7 K/UL
MONOCYTES NFR BLD: 10.5 %
NEUTROPHILS # BLD AUTO: 2.7 K/UL
NEUTROPHILS NFR BLD: 43.2 %
NITRITE UR QL STRIP: NEGATIVE
PH UR STRIP: 7 [PH] (ref 5–8)
PLATELET # BLD AUTO: 224 K/UL
PMV BLD AUTO: 12.1 FL
POTASSIUM SERPL-SCNC: 3.9 MMOL/L
PROT SERPL-MCNC: 7.8 G/DL
PROT UR QL STRIP: NEGATIVE
RBC # BLD AUTO: 4.75 M/UL
RBC #/AREA URNS HPF: 1 /HPF (ref 0–4)
SODIUM SERPL-SCNC: 140 MMOL/L
SP GR UR STRIP: 1 (ref 1–1.03)
SPECIMEN SOURCE: ABNORMAL
SQUAMOUS #/AREA URNS HPF: NORMAL /HPF
T VAGINALIS GENITAL QL WET PREP: ABNORMAL
URN SPEC COLLECT METH UR: NORMAL
UROBILINOGEN UR STRIP-ACNC: NEGATIVE EU/DL
WBC # BLD AUTO: 6.28 K/UL
WBC #/AREA URNS HPF: 1 /HPF (ref 0–5)
WBC #/AREA VAG WET PREP: ABNORMAL
YEAST GENITAL QL WET PREP: ABNORMAL

## 2019-01-06 PROCEDURE — 81025 URINE PREGNANCY TEST: CPT | Performed by: PHYSICIAN ASSISTANT

## 2019-01-06 PROCEDURE — 85025 COMPLETE CBC W/AUTO DIFF WBC: CPT

## 2019-01-06 PROCEDURE — 63600175 PHARM REV CODE 636 W HCPCS: Performed by: PHYSICIAN ASSISTANT

## 2019-01-06 PROCEDURE — 96361 HYDRATE IV INFUSION ADD-ON: CPT

## 2019-01-06 PROCEDURE — 96374 THER/PROPH/DIAG INJ IV PUSH: CPT

## 2019-01-06 PROCEDURE — 80053 COMPREHEN METABOLIC PANEL: CPT

## 2019-01-06 PROCEDURE — 87491 CHLMYD TRACH DNA AMP PROBE: CPT

## 2019-01-06 PROCEDURE — 87210 SMEAR WET MOUNT SALINE/INK: CPT

## 2019-01-06 PROCEDURE — 99284 EMERGENCY DEPT VISIT MOD MDM: CPT | Mod: 25

## 2019-01-06 PROCEDURE — 25000003 PHARM REV CODE 250: Performed by: PHYSICIAN ASSISTANT

## 2019-01-06 PROCEDURE — 81000 URINALYSIS NONAUTO W/SCOPE: CPT

## 2019-01-06 PROCEDURE — 83690 ASSAY OF LIPASE: CPT

## 2019-01-06 RX ORDER — ACETAMINOPHEN 325 MG/1
650 TABLET ORAL
Status: COMPLETED | OUTPATIENT
Start: 2019-01-06 | End: 2019-01-06

## 2019-01-06 RX ORDER — FLUCONAZOLE 200 MG/1
200 TABLET ORAL DAILY
Qty: 1 TABLET | Refills: 0 | Status: SHIPPED | OUTPATIENT
Start: 2019-01-06 | End: 2019-01-07

## 2019-01-06 RX ORDER — ONDANSETRON 2 MG/ML
8 INJECTION INTRAMUSCULAR; INTRAVENOUS
Status: COMPLETED | OUTPATIENT
Start: 2019-01-06 | End: 2019-01-06

## 2019-01-06 RX ORDER — DICYCLOMINE HYDROCHLORIDE 20 MG/1
20 TABLET ORAL 4 TIMES DAILY
Qty: 12 TABLET | Refills: 0 | Status: SHIPPED | OUTPATIENT
Start: 2019-01-06 | End: 2019-01-09

## 2019-01-06 RX ORDER — ONDANSETRON 4 MG/1
4 TABLET, FILM COATED ORAL EVERY 12 HOURS PRN
Qty: 12 TABLET | Refills: 0 | Status: SHIPPED | OUTPATIENT
Start: 2019-01-06 | End: 2021-01-19

## 2019-01-06 RX ADMIN — LIDOCAINE HYDROCHLORIDE: 20 SOLUTION ORAL; TOPICAL at 01:01

## 2019-01-06 RX ADMIN — ACETAMINOPHEN 650 MG: 325 TABLET, FILM COATED ORAL at 01:01

## 2019-01-06 RX ADMIN — SODIUM CHLORIDE 1000 ML: 0.9 INJECTION, SOLUTION INTRAVENOUS at 01:01

## 2019-01-06 RX ADMIN — ONDANSETRON 8 MG: 2 INJECTION INTRAMUSCULAR; INTRAVENOUS at 01:01

## 2019-01-06 NOTE — ED PROVIDER NOTES
"Encounter Date: 2019  SORT: This is a 33 y.o. female with DM, HTN, GERD, IBS, chronic constipation, recent H. Pylori who presents for emergent consideration of lower abdominal pain and dysuria. She reports compliance with H. pylori medications on , give by GI Dr. Craven.  Patient will be moved to a room when one is available. Orders placed.  JENI San PA-C     SCRIBE #1 NOTE: IJohn Paul, james scribing for, and in the presence of,  Stephan Roach PA-C. I have scribed the following portions of the note - Other sections scribed: HPI and ROS.       History     Chief Complaint   Patient presents with    Abdominal Pain     Dx: H Pylori finished abx .  states abd remains painful and swollen.   straining to urinate.  IBS C      CC: Abdominal Pain     HPI: This 33 y.o F with Asthma, Back pain, chronic, chronic constipation, DM, Fibromyalgia, GERD, HSV, HTN, IBS and Mental disorder presents to the ED c/o acute onset of a constant and severe (9/10) "throbbing" pelvic pain with associated nausea and urinary frequency x1 week. She states her pain began after she completed her Amoxicillin and Clindomycin 18. She was Rx'ed antibiotics for H. Pylori. She has never had an endoscopy. She also reports epigastric abdominal pain described as "burning, slicing pain" x1 year which is consistent with her H. Pylori pain. Her epigastric pain improves after eating. Her last normal BM was yesterday. She denies fever, chills, diaphoresis, nausea, emesis, diarrhea, chest pain, dysuria, difficulty urinating, vaginal discharge, vaginal bleeding, SOB and rash.       GI: Doni Marcos MD    PSHx: Tubal ligation ();  section; Dilation and curettage of uterus; HTA (2012); uterine ablation (12); and Hysterectomy (2013).            Review of patient's allergies indicates:  No Known Allergies  Past Medical History:   Diagnosis Date    Asthma     as a child    Back pain, chronic 2013    " "" i have herinated disk"     Diabetes mellitus     Fibromyalgia     GERD (gastroesophageal reflux disease)     Herpes simplex without mention of complication     History of pulmonary embolus during pregnancy 2009    after her second  section    Hypertension     off meds x 1 year    Mental disorder     depression and migraine headaches     Past Surgical History:   Procedure Laterality Date    ABLATION, ENDOMETRIUM, THERMAL N/A 2012    [facility]     SECTION      DILATION AND CURETTAGE OF UTERUS      for elective abortions    HTA  2012    Dr Jiang    HY[facility]CTOMY  2013    [facility]    HYSTERECTOMY, SUPRACERVICAL N/A 2013    [facility] at [facility]    HYSTEROSCOPY, WITH DILATION AND CURETTAGE OF UTERUS  2012    [facility] at [facility]    INSERTION-FILTER-INFERIOR VENA CAVA Right 2013    [facility] at [facility]    marsupialization of Bartholin's cyst in  and 2005 and     SALPINGECTOMY Left 2013    [facility] at [facility]    TUBAL LIGATION  2009    uterine ablation  12     Family History   Problem Relation Age of Onset    Diabetes Mother     Hypertension Mother     Stroke Mother      Social History     Tobacco Use    Smoking status: Former Smoker     Last attempt to quit: 2015     Years since quittin.0    Smokeless tobacco: Never Used    Tobacco comment: One- 2  cigarettes  / day   Substance Use Topics    Alcohol use: Yes     Alcohol/week: 0.6 oz     Types: 1 Shots of liquor per week     Comment: occassionally    Drug use: No     Review of Systems   Constitutional: Negative for chills, diaphoresis and fever.   HENT: Negative for rhinorrhea and sore throat.    Eyes: Negative for redness.   Respiratory: Negative for cough and shortness of breath.    Cardiovascular: Negative for chest pain.   Gastrointestinal: Positive " for abdominal pain. Negative for diarrhea, nausea and vomiting.   Genitourinary: Positive for frequency and pelvic pain. Negative for dysuria and urgency.   Musculoskeletal: Negative for back pain and neck pain.   Skin: Negative for rash.   Psychiatric/Behavioral: The patient is not nervous/anxious.        Physical Exam     Initial Vitals [01/06/19 1118]   BP Pulse Resp Temp SpO2   (!) 144/89 96 18 98.4 °F (36.9 °C) 98 %      MAP       --         Physical Exam    Nursing note and vitals reviewed.  Constitutional: She appears well-developed and well-nourished. She is not diaphoretic. No distress.   HENT:   Head: Normocephalic and atraumatic.   Nose: Nose normal.   Mouth/Throat: Oropharynx is clear and moist. No oropharyngeal exudate.   Eyes: Conjunctivae and EOM are normal. Right eye exhibits no discharge. Left eye exhibits no discharge.   Neck: Normal range of motion. No tracheal deviation present. No JVD present.   Cardiovascular: Normal rate, regular rhythm and normal heart sounds. Exam reveals no friction rub.    No murmur heard.  Pulmonary/Chest: Breath sounds normal. No stridor. No respiratory distress. She has no wheezes. She has no rhonchi. She has no rales. She exhibits no tenderness.   Abdominal: Soft. She exhibits no distension. There is tenderness (mild generalized). There is no rigidity, no rebound, no guarding, no CVA tenderness, no tenderness at McBurney's point and negative Bradley's sign.   Genitourinary: Pelvic exam was performed with patient supine. There is no rash, tenderness or lesion on the right labia. There is no rash, tenderness or lesion on the left labia. Cervix exhibits no motion tenderness, no discharge and no friability. Right adnexum displays no mass and no tenderness. Left adnexum displays no mass and no tenderness. No erythema, tenderness or bleeding in the vagina. No foreign body in the vagina. No signs of injury around the vagina. Vaginal discharge (Moderate amount of thick clumpy  white discharge) found.   Musculoskeletal: Normal range of motion.   Neurological: She is alert and oriented to person, place, and time.   Skin: Skin is warm and dry. No rash and no abscess noted. No erythema. No pallor.         ED Course   Procedures  Labs Reviewed   COMPREHENSIVE METABOLIC PANEL - Abnormal; Notable for the following components:       Result Value    Alkaline Phosphatase 52 (*)     All other components within normal limits   VAGINAL SCREEN - Abnormal; Notable for the following components:    Clue Cells, Wet Prep Moderate (*)     WBC - Vaginal Screen Occasional (*)     All other components within normal limits   C. TRACHOMATIS/N. GONORRHOEAE BY AMP DNA   URINALYSIS, REFLEX TO URINE CULTURE    Narrative:     Preferred Collection Type->Urine, Clean Catch   URINALYSIS MICROSCOPIC    Narrative:     Preferred Collection Type->Urine, Clean Catch   CBC W/ AUTO DIFFERENTIAL   LIPASE   POCT URINE PREGNANCY          Imaging Results    None          Medical Decision Making:   History:   Old Medical Records: I decided to obtain old medical records.  Initial Assessment:   32 yo F with chronic abd pain presents with abd pain  Clinical Tests:   Lab Tests: Ordered and Reviewed  ED Management:  Will obtains screening labs while treating symptoms and monitoring patient.     Remains well appearing. Reports only minimal improvement of pain, however. Nausea resolved. Repeat abdominal exam improved; currently only has mild suprapubic and LLQ abd pain without peritoneal signs. Vitals stable.     Labs completely unremarkable. No UTI. No PID on exam, but yeast is noted, likely from her recent course of abx. Not consistent with SBO, perforated ulcer, or appendicitis. I doubt ovarian torsion and TOA. I am unsure of the etiology of this patient's symptoms, however, I do not believe they are of emergent etiology at this time. I offer patient CT of abdomen/pelvis at this time for further investigation, but patient declines  stating she would prefer to follow up with her GI doctor at [facility]; next appt in 4 days.     Sent home with supportive care. Advising PCP and GI follow up. Strict return precautions discussed. Agreeable to plan.                 Scribe Attestation:   Scribe #1: I performed the above scribed service and the documentation accurately describes the services I performed. I attest to the accuracy of the note.    Attending Attestation:           Physician Attestation for Scribe:  Physician Attestation Statement for Scribe #1: I, Stephan Roach PA-C, reviewed documentation, as scribed by John Paul Reyes in my presence, and it is both accurate and complete.                    Clinical Impression:   The primary encounter diagnosis was Lower abdominal pain. Diagnoses of Vaginal yeast infection, History of gastroesophageal reflux (GERD), History of Helicobacter pylori infection, Nausea, and History of IBS were also pertinent to this visit.                             Stephan Roach PA-C  01/06/19 4830

## 2019-01-06 NOTE — DISCHARGE INSTRUCTIONS
Thank you for coming to our Emergency Department today. It is important to remember that some problems are difficult to diagnose and may not be found during your first visit. Be sure to follow up with your primary care doctor.    Return to the ER with any questions/concerns, new/concerning symptoms, worsening or failure to improve. Do not drive or make any important decisions for 24 hours if you have received any pain medications, sedatives or mood altering drugs during your ER visit.

## 2019-01-08 LAB
C TRACH DNA SPEC QL NAA+PROBE: NOT DETECTED
N GONORRHOEA DNA SPEC QL NAA+PROBE: NOT DETECTED

## 2019-02-11 PROBLEM — R10.84 ABDOMINAL PAIN, GENERALIZED: Status: ACTIVE | Noted: 2019-02-11

## 2019-02-25 ENCOUNTER — OFFICE VISIT (OUTPATIENT)
Dept: OBSTETRICS AND GYNECOLOGY | Facility: CLINIC | Age: 34
End: 2019-02-25
Payer: MEDICAID

## 2019-02-25 VITALS
HEIGHT: 68 IN | SYSTOLIC BLOOD PRESSURE: 120 MMHG | BODY MASS INDEX: 37.08 KG/M2 | WEIGHT: 244.69 LBS | DIASTOLIC BLOOD PRESSURE: 74 MMHG

## 2019-02-25 DIAGNOSIS — F41.9 ANXIETY: Primary | ICD-10-CM

## 2019-02-25 DIAGNOSIS — F32.A DEPRESSION, UNSPECIFIED DEPRESSION TYPE: ICD-10-CM

## 2019-02-25 PROCEDURE — 99214 OFFICE O/P EST MOD 30 MIN: CPT | Mod: S$PBB,,, | Performed by: OBSTETRICS & GYNECOLOGY

## 2019-02-25 PROCEDURE — 99214 PR OFFICE/OUTPT VISIT, EST, LEVL IV, 30-39 MIN: ICD-10-PCS | Mod: S$PBB,,, | Performed by: OBSTETRICS & GYNECOLOGY

## 2019-02-25 PROCEDURE — 99999 PR PBB SHADOW E&M-EST. PATIENT-LVL III: ICD-10-PCS | Mod: PBBFAC,,, | Performed by: OBSTETRICS & GYNECOLOGY

## 2019-02-25 PROCEDURE — 99999 PR PBB SHADOW E&M-EST. PATIENT-LVL III: CPT | Mod: PBBFAC,,, | Performed by: OBSTETRICS & GYNECOLOGY

## 2019-02-25 PROCEDURE — 99213 OFFICE O/P EST LOW 20 MIN: CPT | Mod: PBBFAC | Performed by: OBSTETRICS & GYNECOLOGY

## 2019-02-25 RX ORDER — BUPROPION HYDROCHLORIDE 300 MG/1
300 TABLET ORAL DAILY
Qty: 30 TABLET | Refills: 6 | Status: SHIPPED | OUTPATIENT
Start: 2019-02-25 | End: 2019-09-05 | Stop reason: SDUPTHER

## 2019-02-25 RX ORDER — LORAZEPAM 1 MG/1
1 TABLET ORAL EVERY 6 HOURS PRN
Qty: 20 TABLET | Refills: 0 | Status: SHIPPED | OUTPATIENT
Start: 2019-02-25 | End: 2019-09-05 | Stop reason: SDUPTHER

## 2019-02-25 NOTE — PROGRESS NOTES
"  Subjective:       Patient ID: Dimas Aguayo is a 33 y.o. female.    Chief Complaint:  Anxiety and Depression    History of Present Illness  HPI  Patient comes in today for her annual visit  But she has several other complaints.  Her main complaint today is worsening anxiety and depression  She states that she does not have a desire to "do anything".  "Just go to work, go home and lie down"  Does not enjoy life.  But she denies suicidal or homicidal ideation.  Feeling hot and cold all the time.  "Dry and significant pain with intercourse."    She told her PCP about her depression.  He put her on Wellbutrin 150 mg daily.  "It does not work"    She is still emotional with crying spells.  Sometimes she feels like she can not control what would come out of her mouth.  She is "mean to (her) kids when these things hit."    Her daughter is her best friend.  Daughter is about to leave home for [place] for college.  Patient feels depressed, stressed out, lonely, and isolated though she still has her son with her.  But the boy "has problems (she has) to deal with".    She feels very anxious, "at least three times a week."    She does not want to get back on Lexapro because "that s--- didn't work, either"  She would like to get off of all of her medication.    History of chronic back pain from an accident she had.  Lyrica no longer works for her.    Patient with history of significant postpartum depression.    She was wondering if she should get a CT of her head to see what was wrong with her...    She does not want to take a pill everyday.  She would like to get an anxiolytic episodically.    GYN & OB History  Patient's last menstrual period was 2013.   Date of Last Pap: 10/30/2017    OB History    Para Term  AB Living   3 2 2   1 2   SAB TAB Ectopic Multiple Live Births           2      # Outcome Date GA Lbr Doug/2nd Weight Sex Delivery Anes PTL Lv   3 Term 09 40w0d  3.714 kg (8 lb 3 oz) M " "Vag-Spont EPI N CLIVE   2 AB 2006           1 Term 01 40w0d  3.657 kg (8 lb 1 oz) F Vag-Spont EPI N CLIVE        Past Medical History:   Diagnosis Date    Asthma     as a child    Back pain, chronic 2013    " i have herinated disk"     Diabetes mellitus     Fibromyalgia     GERD (gastroesophageal reflux disease)     Herpes simplex without mention of complication     History of pulmonary embolus during pregnancy 2009    after her second  section    Hypertension     off meds x 1 year    Mental disorder     depression and migraine headaches       Past Surgical History:   Procedure Laterality Date    ABLATION, ENDOMETRIUM, THERMAL N/A 2012    [facility]     SECTION      DILATION AND CURETTAGE OF UTERUS      for elective abortions    EGD (ESOPHAGOGASTRODUODENOSCOPY) N/A 2019    [facility]    ESOPHAGOGASTRODUODENOSCOPY  2019    ESOPHAGOGASTRODUODENOSCOPY  2019    HTA  2012    Dr Jiang    HYSTERECTOMY  2013    [facility]    HYSTERECTOMY, SUPRACERVICAL N/A 2013    [facility]    HYSTEROSCOPY, WITH DILATION AND CURETTAGE OF UTERUS  2012    [facility]    INSERTION-FILTER-INFERIOR VENA CAVA Right 2013    [facility] Konstantin Torres [facility] at [facility] OR    marsupialization of Bartholin's cyst in  and 2005 and     SALPINGECTOMY Left 2013    [facility] OR    TUBAL LIGATION      uterine ablation  12       Family History   Problem Relation Age of Onset    Diabetes Mother     Hypertension Mother     Stroke Mother        Social History     Socioeconomic History    Marital status: Single     Spouse name: None    Number of children: None    Years of education: None    Highest education level: None   Social Needs    Financial resource strain: None    Food insecurity " - worry: None    Food insecurity - inability: None    Transportation needs - medical: None    Transportation needs - non-medical: None   Occupational History    None   Tobacco Use    Smoking status: Former Smoker     Last attempt to quit: 2015     Years since quittin.1    Smokeless tobacco: Never Used    Tobacco comment: One- 2  cigarettes  / day   Substance and Sexual Activity    Alcohol use: Yes     Alcohol/week: 0.6 oz     Types: 1 Shots of liquor per week     Comment: occassionally    Drug use: No    Sexual activity: Yes     Partners: Male     Birth control/protection: Surgical   Other Topics Concern    None   Social History Narrative    Working as a make-up artist    New partner for the past two        Current Outpatient Medications   Medication Sig Dispense Refill    loratadine (CLARITIN) 10 mg tablet       losartan-hydrochlorothiazide 50-12.5 mg (HYZAAR) 50-12.5 mg per tablet Take 1 tablet by mouth once daily.  3    ondansetron (ZOFRAN) 4 MG tablet Take 1 tablet (4 mg total) by mouth every 12 (twelve) hours as needed. 12 tablet 0    pantoprazole (PROTONIX) 20 MG tablet       pregabalin (LYRICA) 300 MG Cap Take 300 mg by mouth 3 (three) times daily.      ranitidine (ZANTAC) 150 MG tablet Take 2 tablets (300 mg total) by mouth nightly. 60 tablet 11    TRUERESULT BLOOD GLUCOSE SYSTM kit USE WITH TEST STRIPS THREE TIMES A DAY  0    TRUETEST TEST STRIPS Strp TEST BLOOD GLUCOSE THREE TIMES A DAY  5    ULTRA THIN LANCETS 30 gauge Misc USE TO TEST BLOOD GLUCOSE THREE TIMES A DAY  5    valACYclovir (VALTREX) 500 MG tablet Take 1 tablet (500 mg total) by mouth 2 (two) times daily. 30 tablet 1    VICTOZA 3-GIANNA 0.6 mg/0.1 mL (18 mg/3 mL) PnIj INJECT 1.8 MG EVERY DAY BY SUBCUTANEOUS ROUTE.  3    buPROPion (WELLBUTRIN XL) 300 MG 24 hr tablet Take 1 tablet (300 mg total) by mouth once daily. 30 tablet 6    LORazepam (ATIVAN) 1 MG tablet Take 1 tablet (1 mg total) by mouth every 6 (six)  hours as needed for Anxiety. 20 tablet 0     No current facility-administered medications for this visit.      Facility-Administered Medications Ordered in Other Visits   Medication Dose Route Frequency Provider Last Rate Last Dose    lactated ringers infusion   Intravenous Continuous Doni Marcos MD 75 mL/hr at 02/11/19 0956      sodium chloride 0.9% flush 3 mL  3 mL Intravenous PRN Doni Marcos MD           Review of patient's allergies indicates:  No Known Allergies      Review of Systems  Review of Systems   Constitutional: Positive for activity change, appetite change, fatigue and unexpected weight change. Negative for chills and fever.   HENT: Negative for mouth sores.    Respiratory: Negative for cough, shortness of breath and wheezing.    Cardiovascular: Negative for chest pain and palpitations.   Gastrointestinal: Negative for abdominal pain, bloating, blood in stool, constipation, nausea and vomiting.   Endocrine: Negative for diabetes and hot flashes.   Genitourinary: Negative for dysmenorrhea, dyspareunia, dysuria, frequency, hematuria, menorrhagia, menstrual problem, pelvic pain, urgency, vaginal bleeding, vaginal discharge, vaginal pain, urinary incontinence, postcoital bleeding and vaginal odor.   Musculoskeletal: Positive for back pain. Negative for myalgias.   Integumentary:  Negative for rash, breast mass and nipple discharge.   Neurological: Negative for seizures and headaches.   Psychiatric/Behavioral: Positive for depression and sleep disturbance. The patient is nervous/anxious.         Crying spells.  Irritable.   Breast: Negative for mass, mastodynia and nipple discharge          Objective:    Physical Exam:   Constitutional: She appears well-developed and well-nourished. She appears distressed.    HENT:   Head: Normocephalic and atraumatic.    Eyes: EOM are normal.    Neck: Normal range of motion.    Cardiovascular: Normal rate.     Pulmonary/Chest: Effort normal. No respiratory  "distress.                  Musculoskeletal: Normal range of motion.       Neurological: She is alert.    Skin: Skin is warm and dry.    Psychiatric:   Irritable.  Angry at time.  Crying at other time.          Assessment:        1. Anxiety    2. Depression, unspecified depression type             Plan:      I have extensively discussed with the patient regarding her condition  We went over several coping mechanisms.  She seemed to fixate on trying "something else new".  Wants to stop all other medications.  We discussed tolerance and addiction with anxiolytics.  After about 40 minutes of trying to help to talk over her situation, she felt a little better but still insists on getting on anxiolytic for episodic management of her anxiety.  We discussed referral to Mental Health.  Will up Wellbutrin to 300 mg XL daily  Ativan 1 mg given #20.  She should not take it daily.  If she would need the medication more than once Rx every 2-3 months, we would stop prescribing.  She will be back in 4 weeks for her annual visit if she feels better.    Refer to Psych.    Total time: 40 minutes        "

## 2019-03-20 DIAGNOSIS — R10.9 AP (ABDOMINAL PAIN): Primary | ICD-10-CM

## 2019-03-27 ENCOUNTER — HOSPITAL ENCOUNTER (OUTPATIENT)
Dept: RADIOLOGY | Facility: HOSPITAL | Age: 34
Discharge: HOME | End: 2019-03-27
Attending: SURGERY
Payer: MEDICAID

## 2019-03-27 DIAGNOSIS — R10.9 AP (ABDOMINAL PAIN): ICD-10-CM

## 2019-03-27 PROCEDURE — 74160 CT ABDOMEN W/CONTRAST: CPT | Mod: TC

## 2019-03-27 PROCEDURE — 74220 FL ESOPHAGRAM COMPLETE: ICD-10-PCS | Mod: 26,,, | Performed by: RADIOLOGY

## 2019-03-27 PROCEDURE — 25500020 PHARM REV CODE 255: Performed by: SURGERY

## 2019-03-27 PROCEDURE — 74220 X-RAY XM ESOPHAGUS 1CNTRST: CPT | Mod: 26,,, | Performed by: RADIOLOGY

## 2019-03-27 PROCEDURE — 74160 CT ABDOMEN W/CONTRAST: CPT | Mod: 26,,, | Performed by: RADIOLOGY

## 2019-03-27 PROCEDURE — 74160 CT ABDOMEN WITH CONTRAST: ICD-10-PCS | Mod: 26,,, | Performed by: RADIOLOGY

## 2019-03-27 PROCEDURE — 74220 X-RAY XM ESOPHAGUS 1CNTRST: CPT | Mod: TC

## 2019-03-27 RX ADMIN — IOHEXOL 80 ML: 350 INJECTION, SOLUTION INTRAVENOUS at 09:03

## 2019-03-27 RX ADMIN — IOHEXOL 150 ML: 350 INJECTION, SOLUTION INTRAVENOUS at 11:03

## 2019-03-27 NOTE — PROGRESS NOTES
Patient came in today for her esophagram water soluble was indicated on the paper order, we tried to perform the test with this contrast and patient could not tolerate. We spoke to Rosemarie at Dr Gottlieb office who informed us that the order was suppose to be a Barium Esophagram. We will perform with barium.    Thanks  Sendy ERICKSON

## 2019-04-09 ENCOUNTER — HOSPITAL ENCOUNTER (OUTPATIENT)
Dept: RADIOLOGY | Facility: HOSPITAL | Age: 34
Discharge: HOME | End: 2019-04-09
Attending: SURGERY
Payer: MEDICAID

## 2019-04-09 ENCOUNTER — HOSPITAL ENCOUNTER (OUTPATIENT)
Dept: PREADMISSION TESTING | Facility: HOSPITAL | Age: 34
Discharge: HOME | End: 2019-04-09
Attending: SURGERY
Payer: MEDICAID

## 2019-04-09 VITALS
HEIGHT: 68 IN | WEIGHT: 236.75 LBS | HEART RATE: 87 BPM | TEMPERATURE: 98 F | OXYGEN SATURATION: 99 % | BODY MASS INDEX: 35.88 KG/M2 | DIASTOLIC BLOOD PRESSURE: 69 MMHG | SYSTOLIC BLOOD PRESSURE: 122 MMHG | RESPIRATION RATE: 17 BRPM

## 2019-04-09 DIAGNOSIS — Z01.818 PRE-OP TESTING: Primary | ICD-10-CM

## 2019-04-09 LAB
ANION GAP SERPL CALC-SCNC: 8 MMOL/L (ref 8–16)
APTT BLDCRRT: 26.3 SEC (ref 21–32)
BASOPHILS # BLD AUTO: 0.02 K/UL (ref 0–0.2)
BASOPHILS NFR BLD: 0.3 % (ref 0–1.9)
BILIRUB UR QL STRIP: NEGATIVE
BUN SERPL-MCNC: 9 MG/DL (ref 6–20)
CALCIUM SERPL-MCNC: 9.4 MG/DL (ref 8.7–10.5)
CHLORIDE SERPL-SCNC: 102 MMOL/L (ref 95–110)
CLARITY UR: CLEAR
CO2 SERPL-SCNC: 29 MMOL/L (ref 23–29)
COLOR UR: COLORLESS
CREAT SERPL-MCNC: 0.8 MG/DL (ref 0.5–1.4)
DIFFERENTIAL METHOD: NORMAL
EOSINOPHIL # BLD AUTO: 0.3 K/UL (ref 0–0.5)
EOSINOPHIL NFR BLD: 3.9 % (ref 0–8)
ERYTHROCYTE [DISTWIDTH] IN BLOOD BY AUTOMATED COUNT: 12.8 % (ref 11.5–14.5)
EST. GFR  (AFRICAN AMERICAN): >60 ML/MIN/1.73 M^2
EST. GFR  (NON AFRICAN AMERICAN): >60 ML/MIN/1.73 M^2
GLUCOSE SERPL-MCNC: 89 MG/DL (ref 70–110)
GLUCOSE UR QL STRIP: NEGATIVE
HCT VFR BLD AUTO: 39.6 % (ref 37–48.5)
HGB BLD-MCNC: 13 G/DL (ref 12–16)
HGB UR QL STRIP: NEGATIVE
INR PPP: 1 (ref 0.8–1.2)
KETONES UR QL STRIP: NEGATIVE
LEUKOCYTE ESTERASE UR QL STRIP: NEGATIVE
LYMPHOCYTES # BLD AUTO: 3.4 K/UL (ref 1–4.8)
LYMPHOCYTES NFR BLD: 44.7 % (ref 18–48)
MCH RBC QN AUTO: 29.6 PG (ref 27–31)
MCHC RBC AUTO-ENTMCNC: 32.8 G/DL (ref 32–36)
MCV RBC AUTO: 90 FL (ref 82–98)
MICROSCOPIC COMMENT: NORMAL
MONOCYTES # BLD AUTO: 0.6 K/UL (ref 0.3–1)
MONOCYTES NFR BLD: 7.3 % (ref 4–15)
NEUTROPHILS # BLD AUTO: 3.3 K/UL (ref 1.8–7.7)
NEUTROPHILS NFR BLD: 43.8 % (ref 38–73)
NITRITE UR QL STRIP: NEGATIVE
PH UR STRIP: 7 [PH] (ref 5–8)
PLATELET # BLD AUTO: 235 K/UL (ref 150–350)
PMV BLD AUTO: 12 FL (ref 9.2–12.9)
POTASSIUM SERPL-SCNC: 3.5 MMOL/L (ref 3.5–5.1)
PROT UR QL STRIP: NEGATIVE
PROTHROMBIN TIME: 10 SEC (ref 9–12.5)
RBC # BLD AUTO: 4.39 M/UL (ref 4–5.4)
RBC #/AREA URNS HPF: 1 /HPF (ref 0–4)
SODIUM SERPL-SCNC: 139 MMOL/L (ref 136–145)
SP GR UR STRIP: 1 (ref 1–1.03)
SQUAMOUS #/AREA URNS HPF: 1 /HPF
URN SPEC COLLECT METH UR: ABNORMAL
UROBILINOGEN UR STRIP-ACNC: NEGATIVE EU/DL
WBC # BLD AUTO: 7.63 K/UL (ref 3.9–12.7)

## 2019-04-09 PROCEDURE — 80048 BASIC METABOLIC PNL TOTAL CA: CPT

## 2019-04-09 PROCEDURE — 93010 EKG 12-LEAD: ICD-10-PCS | Mod: ,,, | Performed by: INTERNAL MEDICINE

## 2019-04-09 PROCEDURE — 71046 X-RAY EXAM CHEST 2 VIEWS: CPT | Mod: TC,FY

## 2019-04-09 PROCEDURE — 93005 ELECTROCARDIOGRAM TRACING: CPT

## 2019-04-09 PROCEDURE — 36415 COLL VENOUS BLD VENIPUNCTURE: CPT

## 2019-04-09 PROCEDURE — 85730 THROMBOPLASTIN TIME PARTIAL: CPT

## 2019-04-09 PROCEDURE — 71046 XR CHEST PA AND LATERAL PRE-OP: ICD-10-PCS | Mod: 26,,, | Performed by: RADIOLOGY

## 2019-04-09 PROCEDURE — 85025 COMPLETE CBC W/AUTO DIFF WBC: CPT

## 2019-04-09 PROCEDURE — 85610 PROTHROMBIN TIME: CPT

## 2019-04-09 PROCEDURE — 93010 ELECTROCARDIOGRAM REPORT: CPT | Mod: ,,, | Performed by: INTERNAL MEDICINE

## 2019-04-09 PROCEDURE — 81000 URINALYSIS NONAUTO W/SCOPE: CPT

## 2019-04-09 PROCEDURE — 71046 X-RAY EXAM CHEST 2 VIEWS: CPT | Mod: 26,,, | Performed by: RADIOLOGY

## 2019-04-09 RX ORDER — DOCUSATE SODIUM 100 MG/1
100 CAPSULE, LIQUID FILLED ORAL 2 TIMES DAILY
COMMUNITY
End: 2021-01-19

## 2019-04-09 RX ORDER — BISACODYL 5 MG
5 TABLET, DELAYED RELEASE (ENTERIC COATED) ORAL DAILY PRN
COMMUNITY
End: 2021-01-19

## 2019-04-09 RX ORDER — IBUPROFEN 100 MG/5ML
1000 SUSPENSION, ORAL (FINAL DOSE FORM) ORAL DAILY
COMMUNITY

## 2019-04-09 NOTE — DISCHARGE INSTRUCTIONS
"  Your surgery is scheduled for __Thursday April 11, 2019__________________.    Call [phone] between 2 p.m. and 5 p.m. on   Wednesday  _ to find out your arrival time for the day of your surgery.      Please report to SAME DAY SURGERY UNIT on the 2nd FLOOR at _______ a.m.  Use front door entrance. The doors open at 0530 am.      If you need WHEELCHAIR assistance please call  [phone] from your cell phone or "0"  from the  hospital courtesy phone located to the right after you enter the hospital lobby.      INSTRUCTIONS IMPORTANT!!!  ¨ Do not eat or drink after 12 midnight-including water. OK to brush teeth, no   gum, candy or mints!    ¨ Take only these medicines with a small swallow of water-morning of surgery.              _x___  Prep instructions   SHOWER     _x___  Please shower using Hibiclens soap the night before AND  the morning ofyour surgery/procedure. Do not use Hibiclens on your face or genitals       x        If your surgery is around your belly button (Navel) be sure to wash inside your belly button also. Rinse hibiclens off completely.  _x___  No shaving of procedural area at least 4-5 days before surgery due  increased risk of skin irritation and/or possible infection.  _x___  Do not wear makeup, including mascara. WEARING EYE MAKEUP MAY LEAD TO SERIOUS EYE INJURY during surgery.  _x___  No powder, lotions or creams to your body.  __x__  You may wear only deodorant on the day of surgery.  __x__  Please remove all jewelry, including piercings and leave at home.  _x___  No money or valuables needed. Please leave at home.  You may bring your cell phone.  ____  Please bring any documents given by your doctor  _x___  If going home the same day, arrange for a ride home. You will not be able to   drive if Anesthesia was used.    _x___  Wear loose fitting clothing. Allow for dressings, bandages.  _x___  Stop Aspirin, Ibuprofen, Motrin and Aleve at least 3-5 days before  surgery, unless otherwise instructed " by your doctor, or the nurse.              You MAY use Tylenol/acetaminophen until day of surgery.  _x___  If you take diabetic medication, do not take am of surgery unless instructed by   Doctor.  _x___  Call MD for temperature above 101 degrees.        _x___ Stop taking any Fish Oil supplement or any Vitamins that contain Vitamin  E at least 5 days prior to surgery.          I have read or had read and explained to me, and understand the above information.  Additional comments or instructions:Please call   [phone] if you have any questions regarding the instructions above.

## 2019-04-10 ENCOUNTER — ANESTHESIA EVENT (OUTPATIENT)
Dept: SURGERY | Facility: HOSPITAL | Age: 34
End: 2019-04-10
Payer: MEDICAID

## 2019-04-11 ENCOUNTER — ANESTHESIA (OUTPATIENT)
Dept: SURGERY | Facility: HOSPITAL | Age: 34
End: 2019-04-11
Payer: MEDICAID

## 2019-04-11 ENCOUNTER — HOSPITAL ENCOUNTER (OUTPATIENT)
Facility: HOSPITAL | Age: 34
Discharge: HOME | End: 2019-04-11
Attending: SURGERY | Admitting: SURGERY
Payer: MEDICAID

## 2019-04-11 VITALS
TEMPERATURE: 98 F | OXYGEN SATURATION: 99 % | SYSTOLIC BLOOD PRESSURE: 138 MMHG | BODY MASS INDEX: 35.88 KG/M2 | RESPIRATION RATE: 18 BRPM | HEART RATE: 76 BPM | WEIGHT: 236.75 LBS | DIASTOLIC BLOOD PRESSURE: 88 MMHG | HEIGHT: 68 IN

## 2019-04-11 DIAGNOSIS — K80.20 GALLSTONES: Primary | ICD-10-CM

## 2019-04-11 LAB — POCT GLUCOSE: 89 MG/DL (ref 70–110)

## 2019-04-11 PROCEDURE — 25000003 PHARM REV CODE 250: Performed by: NURSE ANESTHETIST, CERTIFIED REGISTERED

## 2019-04-11 PROCEDURE — 71000033 HC RECOVERY, INTIAL HOUR: Performed by: SURGERY

## 2019-04-11 PROCEDURE — D9220A PRA ANESTHESIA: Mod: ANES,,, | Performed by: ANESTHESIOLOGY

## 2019-04-11 PROCEDURE — 88304 TISSUE SPECIMEN TO PATHOLOGY - SURGERY: ICD-10-PCS | Mod: 26,,, | Performed by: PATHOLOGY

## 2019-04-11 PROCEDURE — 37000009 HC ANESTHESIA EA ADD 15 MINS: Performed by: SURGERY

## 2019-04-11 PROCEDURE — 00790 ANES IPER UPR ABD NOS: CPT | Performed by: SURGERY

## 2019-04-11 PROCEDURE — 36000709 HC OR TIME LEV III EA ADD 15 MIN: Performed by: SURGERY

## 2019-04-11 PROCEDURE — D9220A PRA ANESTHESIA: ICD-10-PCS | Mod: CRNA,,, | Performed by: NURSE ANESTHETIST, CERTIFIED REGISTERED

## 2019-04-11 PROCEDURE — 63600175 PHARM REV CODE 636 W HCPCS: Performed by: ANESTHESIOLOGY

## 2019-04-11 PROCEDURE — 36000708 HC OR TIME LEV III 1ST 15 MIN: Performed by: SURGERY

## 2019-04-11 PROCEDURE — D9220A PRA ANESTHESIA: Mod: CRNA,,, | Performed by: NURSE ANESTHETIST, CERTIFIED REGISTERED

## 2019-04-11 PROCEDURE — 88304 TISSUE EXAM BY PATHOLOGIST: CPT | Performed by: PATHOLOGY

## 2019-04-11 PROCEDURE — 71000016 HC POSTOP RECOV ADDL HR: Performed by: SURGERY

## 2019-04-11 PROCEDURE — 25000003 PHARM REV CODE 250: Performed by: ANESTHESIOLOGY

## 2019-04-11 PROCEDURE — 63600175 PHARM REV CODE 636 W HCPCS: Performed by: NURSE ANESTHETIST, CERTIFIED REGISTERED

## 2019-04-11 PROCEDURE — 71000015 HC POSTOP RECOV 1ST HR: Performed by: SURGERY

## 2019-04-11 PROCEDURE — 25000003 PHARM REV CODE 250: Performed by: SURGERY

## 2019-04-11 PROCEDURE — 37000008 HC ANESTHESIA 1ST 15 MINUTES: Performed by: SURGERY

## 2019-04-11 PROCEDURE — 88304 TISSUE EXAM BY PATHOLOGIST: CPT | Mod: 26,,, | Performed by: PATHOLOGY

## 2019-04-11 PROCEDURE — 27201423 OPTIME MED/SURG SUP & DEVICES STERILE SUPPLY: Performed by: SURGERY

## 2019-04-11 PROCEDURE — 71000039 HC RECOVERY, EACH ADD'L HOUR: Performed by: SURGERY

## 2019-04-11 PROCEDURE — D9220A PRA ANESTHESIA: ICD-10-PCS | Mod: ANES,,, | Performed by: ANESTHESIOLOGY

## 2019-04-11 RX ORDER — SUCCINYLCHOLINE CHLORIDE 20 MG/ML
INJECTION INTRAMUSCULAR; INTRAVENOUS
Status: DISCONTINUED | OUTPATIENT
Start: 2019-04-11 | End: 2019-04-11

## 2019-04-11 RX ORDER — HYDROCODONE BITARTRATE AND ACETAMINOPHEN 5; 325 MG/1; MG/1
1 TABLET ORAL ONCE
Status: COMPLETED | OUTPATIENT
Start: 2019-04-11 | End: 2019-04-11

## 2019-04-11 RX ORDER — SODIUM CHLORIDE 0.9 % (FLUSH) 0.9 %
3 SYRINGE (ML) INJECTION
Status: DISCONTINUED | OUTPATIENT
Start: 2019-04-11 | End: 2019-04-11 | Stop reason: HOSPADM

## 2019-04-11 RX ORDER — MIDAZOLAM HYDROCHLORIDE 1 MG/ML
INJECTION, SOLUTION INTRAMUSCULAR; INTRAVENOUS
Status: DISCONTINUED | OUTPATIENT
Start: 2019-04-11 | End: 2019-04-11

## 2019-04-11 RX ORDER — ONDANSETRON 2 MG/ML
4 INJECTION INTRAMUSCULAR; INTRAVENOUS EVERY 6 HOURS PRN
Status: DISCONTINUED | OUTPATIENT
Start: 2019-04-11 | End: 2019-04-11 | Stop reason: HOSPADM

## 2019-04-11 RX ORDER — METOCLOPRAMIDE HYDROCHLORIDE 5 MG/ML
INJECTION INTRAMUSCULAR; INTRAVENOUS
Status: DISCONTINUED | OUTPATIENT
Start: 2019-04-11 | End: 2019-04-11

## 2019-04-11 RX ORDER — CEFAZOLIN SODIUM 2 G/50ML
2 SOLUTION INTRAVENOUS
Status: DISCONTINUED | OUTPATIENT
Start: 2019-04-11 | End: 2019-04-11 | Stop reason: HOSPADM

## 2019-04-11 RX ORDER — HYDROMORPHONE HYDROCHLORIDE 2 MG/ML
0.2 INJECTION, SOLUTION INTRAMUSCULAR; INTRAVENOUS; SUBCUTANEOUS EVERY 5 MIN PRN
Status: DISCONTINUED | OUTPATIENT
Start: 2019-04-11 | End: 2019-04-11 | Stop reason: HOSPADM

## 2019-04-11 RX ORDER — SODIUM CHLORIDE, SODIUM LACTATE, POTASSIUM CHLORIDE, CALCIUM CHLORIDE 600; 310; 30; 20 MG/100ML; MG/100ML; MG/100ML; MG/100ML
INJECTION, SOLUTION INTRAVENOUS CONTINUOUS
Status: DISCONTINUED | OUTPATIENT
Start: 2019-04-11 | End: 2019-04-11 | Stop reason: HOSPADM

## 2019-04-11 RX ORDER — DEXAMETHASONE SODIUM PHOSPHATE 4 MG/ML
INJECTION, SOLUTION INTRA-ARTICULAR; INTRALESIONAL; INTRAMUSCULAR; INTRAVENOUS; SOFT TISSUE
Status: DISCONTINUED | OUTPATIENT
Start: 2019-04-11 | End: 2019-04-11

## 2019-04-11 RX ORDER — FENTANYL CITRATE 50 UG/ML
INJECTION, SOLUTION INTRAMUSCULAR; INTRAVENOUS
Status: DISCONTINUED | OUTPATIENT
Start: 2019-04-11 | End: 2019-04-11

## 2019-04-11 RX ORDER — LIDOCAINE HCL/PF 100 MG/5ML
SYRINGE (ML) INTRAVENOUS
Status: DISCONTINUED | OUTPATIENT
Start: 2019-04-11 | End: 2019-04-11

## 2019-04-11 RX ORDER — ROCURONIUM BROMIDE 10 MG/ML
INJECTION, SOLUTION INTRAVENOUS
Status: DISCONTINUED | OUTPATIENT
Start: 2019-04-11 | End: 2019-04-11

## 2019-04-11 RX ORDER — ONDANSETRON 2 MG/ML
INJECTION INTRAMUSCULAR; INTRAVENOUS
Status: DISCONTINUED | OUTPATIENT
Start: 2019-04-11 | End: 2019-04-11

## 2019-04-11 RX ORDER — KETOROLAC TROMETHAMINE 30 MG/ML
INJECTION, SOLUTION INTRAMUSCULAR; INTRAVENOUS
Status: DISCONTINUED | OUTPATIENT
Start: 2019-04-11 | End: 2019-04-11

## 2019-04-11 RX ORDER — HYDROCODONE BITARTRATE AND ACETAMINOPHEN 7.5; 325 MG/1; MG/1
1 TABLET ORAL ONCE
Status: COMPLETED | OUTPATIENT
Start: 2019-04-11 | End: 2019-04-11

## 2019-04-11 RX ORDER — NEOSTIGMINE METHYLSULFATE 1 MG/ML
INJECTION, SOLUTION INTRAVENOUS
Status: DISCONTINUED | OUTPATIENT
Start: 2019-04-11 | End: 2019-04-11

## 2019-04-11 RX ORDER — PROPOFOL 10 MG/ML
VIAL (ML) INTRAVENOUS
Status: DISCONTINUED | OUTPATIENT
Start: 2019-04-11 | End: 2019-04-11

## 2019-04-11 RX ORDER — LIDOCAINE HYDROCHLORIDE 10 MG/ML
1 INJECTION, SOLUTION EPIDURAL; INFILTRATION; INTRACAUDAL; PERINEURAL ONCE
Status: DISCONTINUED | OUTPATIENT
Start: 2019-04-11 | End: 2019-04-11 | Stop reason: HOSPADM

## 2019-04-11 RX ORDER — BUPIVACAINE HYDROCHLORIDE AND EPINEPHRINE 2.5; 5 MG/ML; UG/ML
INJECTION, SOLUTION EPIDURAL; INFILTRATION; INTRACAUDAL; PERINEURAL
Status: DISCONTINUED | OUTPATIENT
Start: 2019-04-11 | End: 2019-04-11 | Stop reason: HOSPADM

## 2019-04-11 RX ORDER — HYDROCODONE BITARTRATE AND ACETAMINOPHEN 7.5; 325 MG/1; MG/1
1 TABLET ORAL EVERY 6 HOURS PRN
Qty: 30 TABLET | Refills: 0 | Status: SHIPPED | OUTPATIENT
Start: 2019-04-11 | End: 2019-06-06 | Stop reason: ALTCHOICE

## 2019-04-11 RX ORDER — FENTANYL CITRATE 50 UG/ML
25 INJECTION, SOLUTION INTRAMUSCULAR; INTRAVENOUS EVERY 5 MIN PRN
Status: DISCONTINUED | OUTPATIENT
Start: 2019-04-11 | End: 2019-04-11 | Stop reason: HOSPADM

## 2019-04-11 RX ORDER — GLYCOPYRROLATE 0.2 MG/ML
INJECTION INTRAMUSCULAR; INTRAVENOUS
Status: DISCONTINUED | OUTPATIENT
Start: 2019-04-11 | End: 2019-04-11

## 2019-04-11 RX ORDER — LIDOCAINE HYDROCHLORIDE 20 MG/ML
5 SOLUTION OROPHARYNGEAL ONCE
Status: COMPLETED | OUTPATIENT
Start: 2019-04-11 | End: 2019-04-11

## 2019-04-11 RX ADMIN — HYDROMORPHONE HYDROCHLORIDE 0.2 MG: 2 INJECTION, SOLUTION INTRAMUSCULAR; INTRAVENOUS; SUBCUTANEOUS at 10:04

## 2019-04-11 RX ADMIN — HYDROCODONE BITARTRATE AND ACETAMINOPHEN 1 TABLET: 7.5; 325 TABLET ORAL at 01:04

## 2019-04-11 RX ADMIN — GLYCOPYRROLATE 0.4 MG: 0.2 INJECTION, SOLUTION INTRAMUSCULAR; INTRAVENOUS at 09:04

## 2019-04-11 RX ADMIN — ONDANSETRON 4 MG: 2 INJECTION, SOLUTION INTRAMUSCULAR; INTRAVENOUS at 09:04

## 2019-04-11 RX ADMIN — LIDOCAINE HYDROCHLORIDE 100 MG: 20 INJECTION, SOLUTION INTRAVENOUS at 08:04

## 2019-04-11 RX ADMIN — SODIUM CHLORIDE, SODIUM LACTATE, POTASSIUM CHLORIDE, AND CALCIUM CHLORIDE: .6; .31; .03; .02 INJECTION, SOLUTION INTRAVENOUS at 08:04

## 2019-04-11 RX ADMIN — SODIUM CHLORIDE, SODIUM LACTATE, POTASSIUM CHLORIDE, AND CALCIUM CHLORIDE: .6; .31; .03; .02 INJECTION, SOLUTION INTRAVENOUS at 07:04

## 2019-04-11 RX ADMIN — LIDOCAINE HYDROCHLORIDE 5 ML: 20 SOLUTION ORAL; TOPICAL at 03:04

## 2019-04-11 RX ADMIN — ROCURONIUM BROMIDE 30 MG: 10 INJECTION, SOLUTION INTRAVENOUS at 09:04

## 2019-04-11 RX ADMIN — PROPOFOL 150 MG: 10 INJECTION, EMULSION INTRAVENOUS at 08:04

## 2019-04-11 RX ADMIN — FENTANYL CITRATE 50 MCG: 50 INJECTION INTRAMUSCULAR; INTRAVENOUS at 09:04

## 2019-04-11 RX ADMIN — KETOROLAC TROMETHAMINE 30 MG: 30 INJECTION, SOLUTION INTRAMUSCULAR; INTRAVENOUS at 04:04

## 2019-04-11 RX ADMIN — HYDROCODONE BITARTRATE AND ACETAMINOPHEN 1 TABLET: 5; 325 TABLET ORAL at 04:04

## 2019-04-11 RX ADMIN — NEOSTIGMINE METHYLSULFATE 4 MG: 1 INJECTION INTRAVENOUS at 09:04

## 2019-04-11 RX ADMIN — MIDAZOLAM HYDROCHLORIDE 2 MG: 1 INJECTION, SOLUTION INTRAMUSCULAR; INTRAVENOUS at 08:04

## 2019-04-11 RX ADMIN — SUCCINYLCHOLINE CHLORIDE 120 MG: 20 INJECTION, SOLUTION INTRAMUSCULAR; INTRAVENOUS at 08:04

## 2019-04-11 RX ADMIN — DEXAMETHASONE SODIUM PHOSPHATE 4 MG: 4 INJECTION, SOLUTION INTRAMUSCULAR; INTRAVENOUS at 04:04

## 2019-04-11 RX ADMIN — GLYCOPYRROLATE 0.2 MG: 0.2 INJECTION, SOLUTION INTRAMUSCULAR; INTRAVENOUS at 08:04

## 2019-04-11 RX ADMIN — FENTANYL CITRATE 100 MCG: 50 INJECTION INTRAMUSCULAR; INTRAVENOUS at 08:04

## 2019-04-11 RX ADMIN — METOCLOPRAMIDE 10 MG: 5 INJECTION, SOLUTION INTRAMUSCULAR; INTRAVENOUS at 08:04

## 2019-04-11 NOTE — PLAN OF CARE
Patient states the oral viscous lidocaine does not help her painful throat. Warm chicken broth given to  Patient. She is able to keep this down  with less complaint of pain than with ice or cold water.

## 2019-04-11 NOTE — ANESTHESIA POSTPROCEDURE EVALUATION
"Anesthesia Post Evaluation    Patient: Dimas Aguayo    Procedure(s) Performed: Procedure(s) (LRB):  CHOLECYSTECTOMY, LAPAROSCOPIC (N/A)    Final Anesthesia Type: general  Patient location during evaluation: PACU  Patient participation: Yes- Able to Participate  Level of consciousness: awake and alert  Post-procedure vital signs: reviewed and stable  Pain management: adequate  Airway patency: patent  PONV status at discharge: No PONV    Cassandra-operative Events Comments: Pt woke up with very sore throat and inflamed area in pharynx  Cardiovascular status: blood pressure returned to baseline and hemodynamically stable  Respiratory status: unassisted and spontaneous ventilation  Hydration status: euvolemic  Follow-up needed   Comments: Dr. Hook saw pt in Same Day Surgery for sore throat. He tried several topical agents to minimize the soreness without much effect. I spoke to Dr. Nehemias de la torre (ENT) and he agreed to come see the pt after one more pt. When I interviewed the pt she stated that her throat hurt worse than her surgical site. Her voice is intact and she has no difficulty breathing. She is concerned because she has had several procedures and never had this happen. Further, she was somewhat awake when she was extubated because she remembers feeling it being removed and she said "OW!" One of her concerns is that we may not have followed the correct protocol and removed it too roughly. I explained that sore throat is a common occurrence with anesthesia, but not usually this level of soreness with visible redness and possible small non-bleeding lac. I informed her that our ENT had agreed to walk over and evaluate her if she would wait until he finishes with one more patient. She declined stating that she had been here all day and wanted to go home. I told her if the soreness becomes worse or does not improve within a couple of days to please call my cell phone number and I would arrange for her to see an ENT. She " still seemed concerned about the mechanism of injury and felt that since it had not happened before perhaps we used undue force in pulling it out. I brought her one of the ETT to show her that it is flexible plastic but that the tissue at the back of her throat is not used to being up against any foreign object. And, I explained that unfortunately, one of the things we can not reliably prevent 100% is sore throat after anesthesia. I told her I was sorry that she suffered this sore throat, and that if I thought we had done anything incorrectly I was obliged to tell her. I told her I believe everything was done correctly but it still left her with a sore throat. She agreed to followup by calling me or her doctor if her throat worsens or does not get better within a couple of days.          Vitals Value Taken Time   /84 4/11/2019 12:35 PM   Temp 36.7 °C (98.1 °F) 4/11/2019 12:35 PM   Pulse 75 4/11/2019 12:35 PM   Resp 16 4/11/2019 12:35 PM   SpO2 98 % 4/11/2019 12:35 PM         Event Time     Out of Recovery 12:30:41          Pain/Ben Score: Pain Rating Prior to Med Admin: 10 (4/11/2019  1:19 PM)  Pain Rating Post Med Admin: 4 (4/11/2019 12:26 PM)  Ben Score: 10 (4/11/2019 12:26 PM)

## 2019-04-11 NOTE — PLAN OF CARE
"Patient complains that she cannot swallow to drink and that she has a "laceration" to her throat. Upon looking into her throat there is redness and irritation to right side of her throat. Charge nurse spoke to patient and is made aware . Call made to anesthesia per charge nurse, Favian Roca RN. Dr Hook assessed patient  and ordered viscous lidocaine. IV fluids increased to finish a bolus of 500 ml to ensure hydration. Patient states she cannot suck on ice or drink water due to pain to right side of throat.   "

## 2019-04-11 NOTE — TRANSFER OF CARE
"Anesthesia Transfer of Care Note    Patient: Dimas Aguayo    Procedure(s) Performed: Procedure(s) (LRB):  CHOLECYSTECTOMY, LAPAROSCOPIC (N/A)    Patient location: PACU    Anesthesia Type: general    Transport from OR: Transported from OR on room air with adequate spontaneous ventilation    Post pain: adequate analgesia    Post assessment: no apparent anesthetic complications and tolerated procedure well    Post vital signs: stable    Level of consciousness: sedated    Nausea/Vomiting: no nausea/vomiting    Transfer of care protocol was followed      Last vitals:   Visit Vitals  BP (!) 146/85 (BP Location: Right arm, Patient Position: Lying)   Pulse 89   Temp 36.3 °C (97.3 °F) (Oral)   Resp 14   Ht 5' 8" (1.727 m)   Wt 107.4 kg (236 lb 12.4 oz)   LMP 05/23/2013   SpO2 100%   Breastfeeding? No   BMI 36.00 kg/m²     "

## 2019-04-11 NOTE — DISCHARGE SUMMARY
[facility]  Discharge Note  Short Stay    Admit Date: 4/11/2019    Discharge Date and Time: 10:07 AM 04/11/2019    Attending Physician: Henry Dillard MD     Discharge Provider: Henry Dillard    Diagnoses:  Active Hospital Problems    Diagnosis  POA    *Gallstones [K80.20]  Yes      Resolved Hospital Problems   No resolved problems to display.       Discharged Condition: good    Hospital Course: Patient was admitted for an outpatient laparoscopic cholecystectomy  and tolerated the procedure well with no complications. The patient was discharged home when pacu criteria me.    Final Diagnoses: Same as principal problem.    Disposition: Home or Self Care    Follow up/Patient Instructions:    Medications:  Reconciled Home Medications:      Medication List      START taking these medications    HYDROcodone-acetaminophen 7.5-325 mg per tablet  Commonly known as:  NORCO  Take 1 tablet by mouth every 6 (six) hours as needed for Pain.        CONTINUE taking these medications    bisacodyl 5 mg EC tablet  Commonly known as:  DULCOLAX  Take 5 mg by mouth daily as needed for Constipation.     buPROPion 300 MG 24 hr tablet  Commonly known as:  WELLBUTRIN XL  Take 1 tablet (300 mg total) by mouth once daily.     docusate sodium 100 MG capsule  Commonly known as:  COLACE  Take 100 mg by mouth 2 (two) times daily.     loratadine 10 mg tablet  Commonly known as:  CLARITIN     LORazepam 1 MG tablet  Commonly known as:  ATIVAN  Take 1 tablet (1 mg total) by mouth every 6 (six) hours as needed for Anxiety.     losartan-hydrochlorothiazide 50-12.5 mg 50-12.5 mg per tablet  Commonly known as:  HYZAAR  Take 1 tablet by mouth once daily.     NON FORMULARY MEDICATION     ondansetron 4 MG tablet  Commonly known as:  ZOFRAN  Take 1 tablet (4 mg total) by mouth every 12 (twelve) hours as needed.     pantoprazole 20 MG tablet  Commonly known as:  PROTONIX  Take 20 mg by mouth once daily.     pregabalin 300 MG  Cap  Commonly known as:  LYRICA  Take 300 mg by mouth 3 (three) times daily.     ranitidine 150 MG tablet  Commonly known as:  ZANTAC  Take 2 tablets (300 mg total) by mouth nightly.     TRUERESULT BLOOD GLUCOSE SYSTM kit  Generic drug:  blood-glucose meter  USE WITH TEST STRIPS THREE TIMES A DAY     TRUETEST TEST STRIPS Strp  Generic drug:  blood sugar diagnostic  TEST BLOOD GLUCOSE THREE TIMES A DAY     ULTRA THIN LANCETS 30 gauge Misc  Generic drug:  lancets  USE TO TEST BLOOD GLUCOSE THREE TIMES A DAY     valACYclovir 500 MG tablet  Commonly known as:  VALTREX  Take 1 tablet (500 mg total) by mouth 2 (two) times daily.     VICTOZA 3-GIANNA 0.6 mg/0.1 mL (18 mg/3 mL) Pnij  Generic drug:  liraglutide 0.6 mg/0.1 mL (18 mg/3 mL) subq PNIJ  INJECT 1.8 MG EVERY DAY BY SUBCUTANEOUS ROUTE.     VITAMIN C 1000 MG tablet  Generic drug:  ascorbic acid (vitamin C)  Take 1,000 mg by mouth once daily.          Discharge Procedure Orders   Diet Adult Regular     Lifting restrictions   Order Comments: No lifting > 10 pounds for 2 weeks     Notify your health care provider if you experience any of the following:  temperature >100.4     Notify your health care provider if you experience any of the following:  redness, tenderness, or signs of infection (pain, swelling, redness, odor or green/yellow discharge around incision site)     No dressing needed   Order Comments: May shower of post-op day #1     Follow-up Information     Henry Dillard MD.    Specialty:  General Surgery  Contact information:  [address] [facility], [facility] [id number] [phone]                   Discharge Procedure Orders (must include Diet, Follow-up, Activity):   Discharge Procedure Orders (must include Diet, Follow-up, Activity)   Diet Adult Regular     Lifting restrictions   Order Comments: No lifting > 10 pounds for 2 weeks     Notify your health care provider if you experience any of the following:  temperature >100.4      Notify your health care provider if you experience any of the following:  redness, tenderness, or signs of infection (pain, swelling, redness, odor or green/yellow discharge around incision site)     No dressing needed   Order Comments: May shower of post-op day #1

## 2019-04-11 NOTE — PLAN OF CARE
Patient states readiness to go home and verbalizes understanding of discharge instructions.She is given Dr Ren's phone number per doctor Ren  if needed.

## 2019-04-11 NOTE — ANESTHESIA PREPROCEDURE EVALUATION
04/11/2019  Dimas Aguyao is a 33 y.o., female.    Anesthesia Evaluation    I have reviewed the Patient Summary Reports.    I have reviewed the Nursing Notes.   I have reviewed the Medications.     Review of Systems  Anesthesia Hx:  No problems with previous Anesthesia Denies Hx of Anesthetic complications  History of prior surgery of interest to airway management or planning: Denies Family Hx of Anesthesia complications.   Denies Personal Hx of Anesthesia complications.   Social:  Smoker    Cardiovascular:   Exercise tolerance: good Hypertension, well controlled            Pulmonary:   Asthma asymptomatic Childhood asthma   Hepatic/GI:   GERD, well controlled    Neurological:   Fibromyalgia   Endocrine:   Diabetes, type 2    Psych:   Psychiatric History          Physical Exam  General:  Well nourished, Obesity    Airway/Jaw/Neck:  Airway Findings: Mouth Opening: Normal Tongue: Normal  General Airway Assessment: Adult  Mallampati: III  TM Distance: Normal, at least 6 cm     Eyes/Ears/Nose:  Eyes/Ears/Nose Findings:          Mental Status:  Mental Status Findings:  Cooperative, Alert and Oriented         Anesthesia Plan  Type of Anesthesia, risks & benefits discussed:  Anesthesia Type:  general  Patient's Preference:   Intra-op Monitoring Plan: standard ASA monitors  Intra-op Monitoring Plan Comments:   Post Op Pain Control Plan: multimodal analgesia, IV/PO Opioids PRN and per primary service following discharge from PACU  Post Op Pain Control Plan Comments:   Induction:   IV  Beta Blocker:  Patient is not currently on a Beta-Blocker (No further documentation required).       Informed Consent: Patient understands risks and agrees with Anesthesia plan.  Questions answered. Anesthesia consent signed with patient.  ASA Score: 2     Day of Surgery Review of History & Physical:     H&P completed by  Anesthesiologist.       Ready For Surgery From Anesthesia Perspective.

## 2019-04-11 NOTE — OP NOTE
[facility]  Operative Note    SUMMARY     Surgery Date: 04/11/2019     Procedure: Laparoscopic Cholecystectomy    Surgeon: Henry Dillard    Assisting Surgeon: None    Pre-op Diagnosis: Symptomatic Cholelithiasis    Post-op Diagnosis:  Symptomatic Cholelithiasis, Chronic Cholecystitis    Anesthesia: General Endotracheal Anesthesia    Findings: Gallbladder with stones, some omental adhesions    Complications: None    Description of Procedure:  The patient was taken back to the Operating Room, placed   on the operating table in the supine fashion.  General anesthesia was induced.    The abdomen was prepped and draped in standard sterile fashion. A WHO time out was performed. SCDs were in place.  Access into the abdomen was gained through   a supraumbilical incision using the optical trocar.  Pneumoperitoneum was   instilled.  Next, two right-sided 5-mm trocars were placed as well as a 5-mm   subxiphoid trocar.  The 5-mm supraumbilical was then changed out to an 11 mm   trocar.  Next, the gallbladder was grasped and retracted towards the right   shoulder.  The fundus was grasped.  The hook was used to score the peritoneum.    Using the [place] dissector, I dissected out the cystic duct and cystic   artery.  Critical view was obtained.  Next, the cystic duct was clipped twice   proximally, once distally. The cystic artery clipped twice proximally,   once distally.  Cystic duct and cystic artery were then transected with   EndoShears.  Next, a hook dissector was used to remove the gallbladder from the   gallbladder fossa.  Prior to removing the gallbladder, clips were reinspected.    There was no bile or blood.  Two clips were present on both the cystic duct and   cystic artery.  Next, the gallbladder was fully dissected off the gallbladder   fossa, placed in an EndoCatch bag and removed from the umbilical incision.  The   abdomen was then desufflated.  The 11 mm trocar site was closed with 0  Vicryl in   a figure-of-eight fashion.  All skin sites were closed with 4-0   Monocryl.  The patient tolerated the procedure well.      Estimated Blood Loss: 5cc         Specimens: Gallbladder

## 2019-04-11 NOTE — DISCHARGE INSTRUCTIONS
DIET: You may resume your home diet. If nausea is present, increase your diet gradually with fluids and bland foods  Avoid fried ,greasy,fatty foods.   Try to also avoid spicy foods.    Drink plenty of fluids, hot and cold. Use anything that you would use for a sore throat . you may want to gargle with warm salt water. Use throat lozenges or antiseptic throat sprays  As needed.     ACTIVITY LEVEL: You have received sedation or an anesthetic, you may feel sleepy for several hours. Rest until you are more awake. Gradually resume your normal activities    Medications: Pain medication should be taken only if needed and as directed. If antibiotics are prescribed, the medication should be taken until completed. You will be given an updated list of you medications.    Last dose of pain medication 1:19 pm    Dressing: Remove in 24 hours may shower no tub bath.     No driving, alcoholic beverages or signing legal documents for next 24 hours or while taking pain medication.       CALL THE DOCTOR:    For any obvious bleeding (some dried blood over the incision is normal).      Redness, swelling, foul smell around incision or fever over 101.   Shortness of breath, Coughing up Bloody sputum, Pains or Swelling in your Calves .   Persistent pain or nausea not relieved by medication.    If any unusual problems or difficulties occur contact your doctor. If you cannot contact your doctor but feel your signs and symptoms warrant a physicians attention return to the emergency room.        Fall Prevention    Millions of people fall every year and injure themselves. You may have had anesthesia or sedation which may increase your risk of falling. You may have health issues that put you at an increased risk of falling.     Here are ways to reduce your risk of falling.  ·   · Make your home safe by keeping walkways clear of objects you may trip over.  · Use non-slip pads under rugs. Do not use area rugs or small throw rugs.  · Use  non-slip mats in bathtubs and showers.  · Install handrails and lights on staircases.  · Do not walk in poorly lit areas.  · Do not stand on chairs or wobbly ladders.  · Use caution when reaching overhead or looking upward. This position can cause a loss of balance.  · Be sure your shoes fit properly, have non-slip bottoms and are in good condition.   · Wear shoes both inside and out. Avoid going barefoot or wearing slippers.  · Be cautious when going up and down stairs, curbs, and when walking on uneven sidewalks.  · If your balance is poor, consider using a cane or walker.  · If your fall was related to alcohol use, stop or limit alcohol intake.   · If your fall was related to use of sleeping medicines, talk to your doctor about this. You may need to reduce your dosage at bedtime if you awaken during the night to go to the bathroom.    · To reduce the need for nighttime bathroom trips:  ¨ Avoid drinking fluids for several hours before going to bed  ¨ Empty your bladder before going to bed  ¨ Men can keep a urinal at the bedside  · Stay as active as you can. Balance, flexibility, strength, and endurance all come from exercise. They all play a role in preventing falls. Ask your healthcare provider which types of activity are right for you.  · Get your vision checked on a regular basis.  · If you have pets, know where they are before you stand up or walk so you don't trip over them.  Use night lights.

## 2019-04-12 NOTE — ANESTHESIA POSTPROCEDURE EVALUATION
Anesthesia Post Evaluation    Patient: Dimas Aguayo    Procedure(s) Performed: Procedure(s) (LRB):  CHOLECYSTECTOMY, LAPAROSCOPIC (N/A)    Final Anesthesia Type: general  Patient location during evaluation: PACU  Patient participation: Yes- Able to Participate  Level of consciousness: awake and alert and oriented  Post-procedure vital signs: reviewed and stable  Pain management: adequate  Airway patency: patent  PONV status at discharge: No PONV  Anesthetic complications: no      Cardiovascular status: blood pressure returned to baseline and hemodynamically stable  Respiratory status: unassisted, spontaneous ventilation and room air  Hydration status: euvolemic  Follow-up not needed (Patient with sore throat and pain post operatively from ETT.  F/u on 4/12 patient states feels much better and has no concerns.   Pain resolving. ).          Vitals Value Taken Time   /88 4/11/2019  4:40 PM   Temp 36.6 °C (97.8 °F) 4/11/2019  4:40 PM   Pulse 76 4/11/2019  4:40 PM   Resp 18 4/11/2019  4:40 PM   SpO2 99 % 4/11/2019  4:40 PM         Event Time     Out of Recovery 12:30:41          Pain/Ben Score: Pain Rating Prior to Med Admin: 8 (4/11/2019  4:30 PM)  Pain Rating Post Med Admin: 7 (4/11/2019  4:42 PM)  Ben Score: 10 (4/11/2019  4:30 PM)

## 2019-04-22 ENCOUNTER — HOSPITAL ENCOUNTER (EMERGENCY)
Facility: HOSPITAL | Age: 34
Discharge: HOME | End: 2019-04-22
Attending: EMERGENCY MEDICINE
Payer: MEDICAID

## 2019-04-22 VITALS
RESPIRATION RATE: 18 BRPM | SYSTOLIC BLOOD PRESSURE: 160 MMHG | HEIGHT: 68 IN | TEMPERATURE: 99 F | OXYGEN SATURATION: 100 % | DIASTOLIC BLOOD PRESSURE: 110 MMHG | HEART RATE: 74 BPM | WEIGHT: 236 LBS | BODY MASS INDEX: 35.77 KG/M2

## 2019-04-22 DIAGNOSIS — R07.9 CHEST PAIN: ICD-10-CM

## 2019-04-22 DIAGNOSIS — R06.02 SOB (SHORTNESS OF BREATH): ICD-10-CM

## 2019-04-22 DIAGNOSIS — M79.662 PAIN OF LEFT CALF: ICD-10-CM

## 2019-04-22 DIAGNOSIS — J18.9 PNEUMONIA DUE TO INFECTIOUS ORGANISM, UNSPECIFIED LATERALITY, UNSPECIFIED PART OF LUNG: Primary | ICD-10-CM

## 2019-04-22 LAB
ALBUMIN SERPL BCP-MCNC: 4.1 G/DL (ref 3.5–5.2)
ALP SERPL-CCNC: 60 U/L (ref 55–135)
ALT SERPL W/O P-5'-P-CCNC: 32 U/L (ref 10–44)
ANION GAP SERPL CALC-SCNC: 7 MMOL/L (ref 8–16)
AST SERPL-CCNC: 23 U/L (ref 10–40)
BASOPHILS # BLD AUTO: 0.07 K/UL (ref 0–0.2)
BASOPHILS NFR BLD: 0.9 % (ref 0–1.9)
BILIRUB SERPL-MCNC: 0.3 MG/DL (ref 0.1–1)
BNP SERPL-MCNC: 11 PG/ML (ref 0–99)
BUN SERPL-MCNC: 11 MG/DL (ref 6–20)
CALCIUM SERPL-MCNC: 10.2 MG/DL (ref 8.7–10.5)
CHLORIDE SERPL-SCNC: 104 MMOL/L (ref 95–110)
CO2 SERPL-SCNC: 26 MMOL/L (ref 23–29)
CREAT SERPL-MCNC: 0.8 MG/DL (ref 0.5–1.4)
DIFFERENTIAL METHOD: NORMAL
EOSINOPHIL # BLD AUTO: 0.5 K/UL (ref 0–0.5)
EOSINOPHIL NFR BLD: 6.2 % (ref 0–8)
ERYTHROCYTE [DISTWIDTH] IN BLOOD BY AUTOMATED COUNT: 12.8 % (ref 11.5–14.5)
EST. GFR  (AFRICAN AMERICAN): >60 ML/MIN/1.73 M^2
EST. GFR  (NON AFRICAN AMERICAN): >60 ML/MIN/1.73 M^2
GLUCOSE SERPL-MCNC: 88 MG/DL (ref 70–110)
HCT VFR BLD AUTO: 41.1 % (ref 37–48.5)
HGB BLD-MCNC: 13.6 G/DL (ref 12–16)
INR PPP: 1 (ref 0.8–1.2)
LYMPHOCYTES # BLD AUTO: 3.7 K/UL (ref 1–4.8)
LYMPHOCYTES NFR BLD: 47.8 % (ref 18–48)
MCH RBC QN AUTO: 29.7 PG (ref 27–31)
MCHC RBC AUTO-ENTMCNC: 33.1 G/DL (ref 32–36)
MCV RBC AUTO: 90 FL (ref 82–98)
MONOCYTES # BLD AUTO: 0.5 K/UL (ref 0.3–1)
MONOCYTES NFR BLD: 6.2 % (ref 4–15)
NEUTROPHILS # BLD AUTO: 3 K/UL (ref 1.8–7.7)
NEUTROPHILS NFR BLD: 38.9 % (ref 38–73)
PLATELET # BLD AUTO: 253 K/UL (ref 150–350)
PMV BLD AUTO: 12.2 FL (ref 9.2–12.9)
POTASSIUM SERPL-SCNC: 4.3 MMOL/L (ref 3.5–5.1)
PROT SERPL-MCNC: 7.5 G/DL (ref 6–8.4)
PROTHROMBIN TIME: 10.4 SEC (ref 9–12.5)
RBC # BLD AUTO: 4.58 M/UL (ref 4–5.4)
SODIUM SERPL-SCNC: 137 MMOL/L (ref 136–145)
TROPONIN I SERPL DL<=0.01 NG/ML-MCNC: <0.006 NG/ML (ref 0–0.03)
WBC # BLD AUTO: 7.74 K/UL (ref 3.9–12.7)

## 2019-04-22 PROCEDURE — 25500020 PHARM REV CODE 255: Performed by: EMERGENCY MEDICINE

## 2019-04-22 PROCEDURE — 25000003 PHARM REV CODE 250: Performed by: NURSE PRACTITIONER

## 2019-04-22 PROCEDURE — 96374 THER/PROPH/DIAG INJ IV PUSH: CPT

## 2019-04-22 PROCEDURE — 63600175 PHARM REV CODE 636 W HCPCS: Performed by: EMERGENCY MEDICINE

## 2019-04-22 PROCEDURE — 80053 COMPREHEN METABOLIC PANEL: CPT

## 2019-04-22 PROCEDURE — 96372 THER/PROPH/DIAG INJ SC/IM: CPT | Mod: 59

## 2019-04-22 PROCEDURE — 93010 ELECTROCARDIOGRAM REPORT: CPT | Mod: ,,, | Performed by: INTERNAL MEDICINE

## 2019-04-22 PROCEDURE — 83880 ASSAY OF NATRIURETIC PEPTIDE: CPT

## 2019-04-22 PROCEDURE — 84484 ASSAY OF TROPONIN QUANT: CPT

## 2019-04-22 PROCEDURE — 85025 COMPLETE CBC W/AUTO DIFF WBC: CPT

## 2019-04-22 PROCEDURE — 99285 EMERGENCY DEPT VISIT HI MDM: CPT | Mod: 25

## 2019-04-22 PROCEDURE — 85610 PROTHROMBIN TIME: CPT

## 2019-04-22 PROCEDURE — 93010 EKG 12-LEAD: ICD-10-PCS | Mod: ,,, | Performed by: INTERNAL MEDICINE

## 2019-04-22 PROCEDURE — 93005 ELECTROCARDIOGRAM TRACING: CPT

## 2019-04-22 RX ORDER — IBUPROFEN 600 MG/1
600 TABLET ORAL EVERY 6 HOURS PRN
Qty: 20 TABLET | Refills: 0 | Status: SHIPPED | OUTPATIENT
Start: 2019-04-22 | End: 2019-09-05

## 2019-04-22 RX ORDER — ENOXAPARIN SODIUM 150 MG/ML
1 INJECTION SUBCUTANEOUS ONCE
Status: COMPLETED | OUTPATIENT
Start: 2019-04-22 | End: 2019-04-22

## 2019-04-22 RX ORDER — ACETAMINOPHEN 500 MG
1000 TABLET ORAL
Status: COMPLETED | OUTPATIENT
Start: 2019-04-22 | End: 2019-04-22

## 2019-04-22 RX ORDER — KETOROLAC TROMETHAMINE 30 MG/ML
30 INJECTION, SOLUTION INTRAMUSCULAR; INTRAVENOUS
Status: COMPLETED | OUTPATIENT
Start: 2019-04-22 | End: 2019-04-22

## 2019-04-22 RX ORDER — AZITHROMYCIN 250 MG/1
250 TABLET, FILM COATED ORAL DAILY
Qty: 6 TABLET | Refills: 0 | Status: SHIPPED | OUTPATIENT
Start: 2019-04-22 | End: 2019-06-06 | Stop reason: ALTCHOICE

## 2019-04-22 RX ADMIN — IOHEXOL 75 ML: 350 INJECTION, SOLUTION INTRAVENOUS at 05:04

## 2019-04-22 RX ADMIN — ENOXAPARIN SODIUM 110 MG: 150 INJECTION SUBCUTANEOUS at 05:04

## 2019-04-22 RX ADMIN — ACETAMINOPHEN 1000 MG: 500 TABLET, FILM COATED ORAL at 04:04

## 2019-04-22 RX ADMIN — KETOROLAC TROMETHAMINE 30 MG: 30 INJECTION, SOLUTION INTRAMUSCULAR at 06:04

## 2019-04-22 NOTE — ED PROVIDER NOTES
Encounter Date: 4/22/2019    SCRIBE #1 NOTE: I, Stephan Castaneda, am scribing for, and in the presence of,  Harshad Willoughby MD. I have scribed the following portions of the note - Other sections scribed: HPI, ROS, PE, EKG.      This is an initial triage evaluation of Dimas Aguayo, a 33 y.o., female  that presents to the Emergency Department with c/o sob and check tightness.  H/O DVT.  Recent surg 4/11.  Lef tleg pain    Pertinent exam findings: none    Orders Pending : u/s left leg / ecg/ labs  Destination: ac    I have evaluated and provided a medical screening exam with initial orders placed, if indicated, to expedite care. The patient is stable to return to the waiting area and will be placed in a treatment area when one is available. Care will be transferred to an alternate provider when patient is roomed from the lobby for full assessment including: history, physical exam, additional orders, and final disposition.      JEIMY Crawford, CARLOSP-MARY     History   No chief complaint on file.    CC: Leg Pain    Pt notes that she had gallbladder removal approx 11 days ago, notes that over the last 3-4 days the patient has noted decreased exercise tolerance, notes she can only walk short distances without getting sob and notes that when she breaths even at rest she feels pleuritic pain and sob. She does have a hx of prior PE in setting of prior pregnancy  In 2009 and has an ivc filter in place. She was only on anticoagulation for 1 year and is currently not taking any.    HPI: This 33 y.o. Female presents to the ED for an evaluation of SOB, L leg pain and L foot 1st and 2nd phalange pain/tingling for the past 6 days. Pt reports having a cholecystectomy 4/11/19. She came to the ED b/c she has a hx of PE and fears she could have another one. Pt used a hot towel to attempt to relieve her foot symptoms. She denies any  symptoms, fever, abdominal pain, diarrhea, nausea, emesis or chest pain. Shortness of breath is worse with  "exertion.  Not have any chest pain.  No fevers or chills.    PMHx: child asthma, chronic back pain, DM2, fibromyalgia, GERD, HTN    The history is provided by the patient. No  was used.     Review of patient's allergies indicates:  No Known Allergies  Past Medical History:   Diagnosis Date    Asthma     as a child    Back pain, chronic 2013    " i have herinated disk"     Diabetes mellitus     Fibromyalgia     GERD (gastroesophageal reflux disease)     Herpes simplex without mention of complication     History of pulmonary embolus during pregnancy 2009    after her second  section    Hypertension     off meds x 1 year    Mental disorder     depression and migraine headaches     Past Surgical History:   Procedure Laterality Date    ABLATION, ENDOMETRIUM, THERMAL N/A 2012    [facility] at [facility]     SECTION      CHOLECYSTECTOMY, LAPAROSCOPIC N/A 2019    [facility]illard [facility] at [facility]    DILATION AND CURETTAGE OF UTERUS      for elective abortions    EGD (ESOPHAGOGASTRODUODENOSCOPY) N/A 2019    [facility]O    ESOPHAGOGASTRODUODENOSCOPY  2019    ESOPHAGOGASTRODUODENOSCOPY  2019    HTA  2012    Dr Jiang    HYSTERECTOMY  2013    [facility]    HYSTERECTOMY, SUPRACERVICAL N/A 2013    Per[facility] at [facility] OR    HYSTEROSCOPY, WITH DILATION AND CURETTAGE OF UTERUS  2012    [facility] at [facility]    INSERTION-FILTER-INFERIOR VENA CAVA Right 2013    Performed by Konstantin Torres MD at [facility] OR    marsupialization of Bartholin's cyst in  and 2005 and     SALPINGECTOMY Left 2013    [facility] at [facility] OR    TUBAL LIGATION      uterine ablation  12     Family History   Problem Relation Age of Onset    Diabetes Mother     Hypertension Mother     Stroke Mother  "     Social History     Tobacco Use    Smoking status: Former Smoker     Last attempt to quit: 2015     Years since quittin.3    Smokeless tobacco: Never Used    Tobacco comment: One- 2  cigarettes  / day   Substance Use Topics    Alcohol use: Yes     Alcohol/week: 0.6 oz     Types: 1 Shots of liquor per week     Comment: rarely    Drug use: No     Review of Systems   Constitutional: Negative for fever.   HENT: Negative for sore throat.    Respiratory: Positive for shortness of breath.    Cardiovascular: Negative for chest pain.   Gastrointestinal: Negative for nausea.   Genitourinary: Negative for dysuria.   Musculoskeletal: Positive for arthralgias. Negative for back pain.   Skin: Negative for rash.   Neurological: Positive for numbness. Negative for weakness.   Hematological: Does not bruise/bleed easily.   All other systems reviewed and are negative.      Physical Exam     Initial Vitals   BP Pulse Resp Temp SpO2   -- -- -- -- --      MAP       --         Physical Exam    Nursing note and vitals reviewed.  Constitutional: She appears well-developed and well-nourished.   HENT:   Head: Normocephalic and atraumatic.   Eyes: Conjunctivae and EOM are normal. Pupils are equal, round, and reactive to light.   Neck: Normal range of motion.   Cardiovascular: Normal rate and regular rhythm.   Pulmonary/Chest: Breath sounds normal. No respiratory distress. She has no wheezes. She has no rales.   Abdominal: She exhibits no distension.   Musculoskeletal: Normal range of motion.   Neurological: She is alert. No cranial nerve deficit. GCS score is 15. GCS eye subscore is 4. GCS verbal subscore is 5. GCS motor subscore is 6.   Skin: Skin is warm and dry.   Psychiatric: She has a normal mood and affect. Thought content normal.         ED Course   Procedures  Labs Reviewed - No data to display  EKG Readings: (Independently Interpreted)   Initial Reading: No STEMI. Rhythm: Normal Sinus Rhythm. Heart Rate: 83.   Pt has  a normal EKG with [place] interval of 144 ms and QRS duration of 88 ms.       Imaging Results    None          Medical Decision Making:   Initial Assessment:   US LE show no dvt    Patient presenting secondary shortness of breath and leg pain. Her concern for blood clot due to her history.  Patient does have an IVC filter however.  Ultrasound and CTA of the chest was ordered.  No signs of blood clot on either.  Her concerning for developing pneumonia.  Patient received Tylenol and shot of IV Toradol for pain.  Discharged home with azithromycin.      Also considered but less likely:     Tamponade: unlikely due to chest xray and ekg  STEMI: No STEMI on ekg  Dissection: equal pulses bilaterally and no ripping chest pain to the back  Esophageal rupture: no dysphagia or vomiting and chest xray negative for mediastinal air  Arrhythmia: no arrhythmia on ekg  CHF: no fluid overload on Cxr and physical exam  Pneumothorax: bilateral breath sounds and no signs of pneumothorax on chest xray     Patient discharged home with ibuprofen antibiotics. I discussed with the patient the diagnosis, treatment plan, indications for return to the emergency department, and for expected follow-up. The patient verbalized an understanding. The patient is asked if there are any questions or concerns. We discuss the case, until all issues are addressed to the patients satisfaction. Patient understands and is agreeable to the plan.   Harshad Willoughby    Clinical Tests:   Lab Tests: Ordered and Reviewed  Radiological Study: Reviewed and Ordered  Medical Tests: Reviewed and Ordered            Scribe Attestation:   Scribe #1: I performed the above scribed service and the documentation accurately describes the services I performed. I attest to the accuracy of the note.    Attending Attestation:           Physician Attestation for Scribe:  Physician Attestation Statement for Scribe #1: I, Harshad Willoughby MD, reviewed documentation, as scribed by Stephan Castaneda in  my presence, and it is both accurate and complete.                  US Lower Extremity Veins Left   Final Result      No evidence for left lower extremity deep venous thrombosis. Clinical correlation and follow up as clinically warranted.         Electronically signed by: Juaquin Nettles DO   Date:    04/22/2019   Time:    15:41      X-Ray Chest PA And Lateral   Final Result      No acute process.         Electronically signed by: Siddharth Candelario MD   Date:    04/22/2019   Time:    14:38      CTA Chest Non-Coronary (PE Study)    (Results Pending)          Clinical Impression:       ICD-10-CM ICD-9-CM   1. Pneumonia due to infectious organism, unspecified laterality, unspecified part of lung J18.9 136.9     484.8   2. Chest pain R07.9 786.50   3. Pain of left calf M79.662 729.5   4. SOB (shortness of breath) R06.02 786.05                                Harshad Willoughby MD  04/22/19 [id number]

## 2019-04-22 NOTE — ED TRIAGE NOTES
Pt with a hx of a PE reports sob and L leg pain since Thursday. Pt denies chest pain and says she was taken off of her blood thinners

## 2019-04-22 NOTE — ED PROVIDER NOTES
"Encounter Date: 2019    SCRIBE #1 NOTE: I, Stephan Castaneda, am scribing for, and in the presence of,  Harshad Willoughby MD. I have scribed the following portions of the note - Other sections scribed: HPI, ROS, PE, EKG.       History     Chief Complaint   Patient presents with    Leg Pain     pt here for left leg pain. hx of PE, not on blood thinners. pt had gallbladder removed 19.     Shortness of Breath     SOB with "chest spasms" x6 days.      CC: Leg Pain    HPI: This 33 y.o. Female presents to the ED for an evaluation of SOB, L leg pain and L foot 1st and 2nd phalange numbness for the past 6 days. Pt reports having a cholecystectomy 19. She came to the ED b/c she has a hx of PE and fears she could have another one. Pt used a hot towel to attempt to relieve her numbness. She denies any  symptoms, fever, abdominal pain, diarrhea, nausea, emesis or chest pain.    PMHx: DM2, fibromyalgia, GERD, HTN    The history is provided by the patient. No  was used.     Review of patient's allergies indicates:  No Known Allergies  Past Medical History:   Diagnosis Date    Asthma     as a child    Back pain, chronic 2013    " i have herinated disk"     Diabetes mellitus     Fibromyalgia     GERD (gastroesophageal reflux disease)     Herpes simplex without mention of complication     History of pulmonary embolus during pregnancy     after her second  section    Hypertension     off meds x 1 year    Mental disorder     depression and migraine headaches     Past Surgical History:   Procedure Laterality Date    ABLATION, ENDOMETRIUM, THERMAL N/A 2012    [facility]     SECTION      CHOLECYSTECTOMY, LAPAROSCOPIC N/A 2019    [facility] at [facility]    DILATION AND CURETTAGE OF UTERUS      for elective abortions    EGD (ESOPHAGOGASTRODUODENOSCOPY) N/A 2019    [facility] " [facility]O    ESOPHAGOGASTRODUODENOSCOPY  2019    ESOPHAGOGASTRODUODENOSCOPY  2019    HTA  2012    Dr Jiang    HYSTERECTOMY  2013    [facility]    HYSTERECTOMY, SUPRACERVICAL N/A 2013    [facility]    HYSTEROSCOPY, WITH DILATION AND CURETTAGE OF UTERUS  2012    [facility] OR    INSERTION-FILTER-INFERIOR VENA CAVA Right 2013    Per[facility] Konstantin [facility] at [facility] OR    marsupialization of Bartholin's cyst in  and 2005 and     SALPINGECTOMY Left 2013    [facility] at [facility] OR    TUBAL LIGATION      uterine ablation  12     Family History   Problem Relation Age of Onset    Diabetes Mother     Hypertension Mother     Stroke Mother      Social History     Tobacco Use    Smoking status: Former Smoker     Last attempt to quit: 2015     Years since quittin.3    Smokeless tobacco: Never Used    Tobacco comment: One- 2  cigarettes  / day   Substance Use Topics    Alcohol use: Yes     Alcohol/week: 0.6 oz     Types: 1 Shots of liquor per week     Comment: rarely    Drug use: No     Review of Systems   Constitutional: Negative for chills and fever.   HENT: Negative for ear pain, rhinorrhea and sore throat.    Eyes: Negative for redness.   Respiratory: Positive for shortness of breath.    Cardiovascular: Negative for chest pain.   Gastrointestinal: Negative for abdominal pain, diarrhea, nausea and vomiting.   Genitourinary: Negative for dysuria and hematuria.   Musculoskeletal: Positive for arthralgias. Negative for back pain and neck pain.   Skin: Negative for rash.   Neurological: Positive for numbness. Negative for weakness and headaches.   Hematological: Does not bruise/bleed easily.   Psychiatric/Behavioral: The patient is not nervous/anxious.        Physical Exam     Initial Vitals [19 1412]   BP Pulse Resp Temp SpO2   124/82 96 19 98.7 °F (37.1 °C)  100 %      MAP       --         Physical Exam    Nursing note and vitals reviewed.  Constitutional: She appears well-developed and well-nourished. She is cooperative.  Non-toxic appearance. No distress.   HENT:   Head: Normocephalic and atraumatic.   Mouth/Throat: Oropharynx is clear and moist.   Eyes: Conjunctivae and EOM are normal. Pupils are equal, round, and reactive to light.   Neck: Normal range of motion and full passive range of motion without pain. Neck supple. No thyromegaly present.   Cardiovascular: Normal rate, regular rhythm, normal heart sounds and normal pulses.   Pulmonary/Chest: Effort normal and breath sounds normal. No respiratory distress.   Abdominal: Soft. Normal appearance and bowel sounds are normal. She exhibits no distension. There is no tenderness.   Musculoskeletal: Normal range of motion.   Neurological: She is alert and oriented to person, place, and time. She has normal strength. No cranial nerve deficit or sensory deficit.   Skin: Skin is warm, dry and intact. No rash noted.   Psychiatric: She has a normal mood and affect. Her speech is normal and behavior is normal. Judgment and thought content normal.         ED Course   Procedures  Labs Reviewed   CBC W/ AUTO DIFFERENTIAL   COMPREHENSIVE METABOLIC PANEL   TROPONIN I   PROTIME-INR   TROPONIN I   B-TYPE NATRIURETIC PEPTIDE     EKG Readings: (Independently Interpreted)   Initial Reading: No STEMI. Rhythm: Normal Sinus Rhythm. Heart Rate: 83.   Pt has a normal EKG with MD intervbal of 144 ms and QRS duration of 88 ms.       Imaging Results          CTA Chest Non-Coronary (PE Study) (In process)                US Lower Extremity Veins Left (Final result)  Result time 04/22/19 15:41:00    Final result by Juaquin Nettles DO (04/22/19 15:41:00)                 Impression:      No evidence for left lower extremity deep venous thrombosis. Clinical correlation and follow up as clinically warranted.      Electronically signed by: Juaquin  DO Tho  Date:    04/22/2019  Time:    15:41             Narrative:    CLINICAL HISTORY:  Pain in left lower leg    TECHNIQUE:  Duplex and color flow Doppler evaluation of the left lower extremity veins was performed.  grayscale graded compression, color-flow and Doppler wave form imaging of the left lower extremity venous system.    COMPARISON:  None    FINDINGS:  The left common femoral, superficial femoral and popliteal veins demonstrate normal gray scale graded compression, color-flow and Doppler wave forms. The Doppler wave form imaging demonstrate normal respiratory phasicity and augmentation. No evidence for left lower extremity deep venous thrombosis.                               X-Ray Chest PA And Lateral (Final result)  Result time 04/22/19 14:38:43    Final result by Siddharth Candelario MD (04/22/19 14:38:43)                 Impression:      No acute process.      Electronically signed by: Siddharth Candelario MD  Date:    04/22/2019  Time:    14:38             Narrative:    EXAMINATION:  XR CHEST PA AND LATERAL    CLINICAL HISTORY:  Chest Pain;    TECHNIQUE:  PA and lateral views of the chest were performed.    COMPARISON:  04/09/2019.    FINDINGS:  The trachea is unremarkable.  The cardiomediastinal silhouette is within normal limits.  The hilar structures are unremarkable.  The hemidiaphragms are unremarkable.  There is no evidence of free air beneath the hemidiaphragms.  There are no pleural effusions.  There is no evidence of a pneumothorax.  There is no evidence of pneumomediastinum.  No airspace opacity is present.  The osseous structures are unremarkable.    There are postoperative changes in the right upper quadrant.  The remainder of the visualized upper abdominal structures are unremarkable.                                            Scribe Attestation:   Scribe #1: I performed the above scribed service and the documentation accurately describes the services I performed. I attest to the accuracy of the  note.    Attending Attestation:           Physician Attestation for Scribe:  Physician Attestation Statement for Scribe #1: I, Harshad Willoughby MD, reviewed documentation, as scribed by Stephan Castaneda in my presence, and it is both accurate and complete.                    Clinical Impression:       ICD-10-CM ICD-9-CM   1. Chest pain R07.9 786.50   2. Pain of left calf M79.662 729.5   3. SOB (shortness of breath) R06.02 786.05

## 2019-04-22 NOTE — ED NOTES
Discussed with main ED charge provider, Lynn GOLDSMITH. Pt will be moved to main side when next bed becomes available.

## 2019-06-06 ENCOUNTER — HOSPITAL ENCOUNTER (EMERGENCY)
Facility: HOSPITAL | Age: 34
Discharge: HOME | End: 2019-06-06
Attending: EMERGENCY MEDICINE
Payer: MEDICAID

## 2019-06-06 VITALS
WEIGHT: 230 LBS | DIASTOLIC BLOOD PRESSURE: 75 MMHG | BODY MASS INDEX: 34.86 KG/M2 | RESPIRATION RATE: 18 BRPM | HEART RATE: 82 BPM | TEMPERATURE: 98 F | SYSTOLIC BLOOD PRESSURE: 121 MMHG | OXYGEN SATURATION: 99 % | HEIGHT: 68 IN

## 2019-06-06 DIAGNOSIS — R07.9 CHEST PAIN: ICD-10-CM

## 2019-06-06 DIAGNOSIS — M54.6 ACUTE LEFT-SIDED THORACIC BACK PAIN: ICD-10-CM

## 2019-06-06 DIAGNOSIS — R07.89 CHEST WALL PAIN: Primary | ICD-10-CM

## 2019-06-06 LAB
ALBUMIN SERPL BCP-MCNC: 4.3 G/DL (ref 3.5–5.2)
ALP SERPL-CCNC: 54 U/L (ref 55–135)
ALT SERPL W/O P-5'-P-CCNC: 29 U/L (ref 10–44)
ANION GAP SERPL CALC-SCNC: 9 MMOL/L (ref 8–16)
AST SERPL-CCNC: 19 U/L (ref 10–40)
BASOPHILS # BLD AUTO: 0.02 K/UL (ref 0–0.2)
BASOPHILS NFR BLD: 0.3 % (ref 0–1.9)
BILIRUB SERPL-MCNC: 0.2 MG/DL (ref 0.1–1)
BNP SERPL-MCNC: <10 PG/ML (ref 0–99)
BUN SERPL-MCNC: 10 MG/DL (ref 6–20)
CALCIUM SERPL-MCNC: 9.7 MG/DL (ref 8.7–10.5)
CHLORIDE SERPL-SCNC: 104 MMOL/L (ref 95–110)
CO2 SERPL-SCNC: 27 MMOL/L (ref 23–29)
CREAT SERPL-MCNC: 0.9 MG/DL (ref 0.5–1.4)
D DIMER PPP IA.FEU-MCNC: 0.46 MG/L FEU
DIFFERENTIAL METHOD: NORMAL
EOSINOPHIL # BLD AUTO: 0.1 K/UL (ref 0–0.5)
EOSINOPHIL NFR BLD: 1.8 % (ref 0–8)
ERYTHROCYTE [DISTWIDTH] IN BLOOD BY AUTOMATED COUNT: 13.4 % (ref 11.5–14.5)
EST. GFR  (AFRICAN AMERICAN): >60 ML/MIN/1.73 M^2
EST. GFR  (NON AFRICAN AMERICAN): >60 ML/MIN/1.73 M^2
GLUCOSE SERPL-MCNC: 102 MG/DL (ref 70–110)
HCT VFR BLD AUTO: 41.2 % (ref 37–48.5)
HGB BLD-MCNC: 13.7 G/DL (ref 12–16)
LYMPHOCYTES # BLD AUTO: 3.2 K/UL (ref 1–4.8)
LYMPHOCYTES NFR BLD: 47.2 % (ref 18–48)
MCH RBC QN AUTO: 29.7 PG (ref 27–31)
MCHC RBC AUTO-ENTMCNC: 33.3 G/DL (ref 32–36)
MCV RBC AUTO: 89 FL (ref 82–98)
MONOCYTES # BLD AUTO: 0.4 K/UL (ref 0.3–1)
MONOCYTES NFR BLD: 6.5 % (ref 4–15)
NEUTROPHILS # BLD AUTO: 3 K/UL (ref 1.8–7.7)
NEUTROPHILS NFR BLD: 44.2 % (ref 38–73)
PLATELET # BLD AUTO: 301 K/UL (ref 150–350)
PMV BLD AUTO: 11.8 FL (ref 9.2–12.9)
POTASSIUM SERPL-SCNC: 4 MMOL/L (ref 3.5–5.1)
PROT SERPL-MCNC: 7.6 G/DL (ref 6–8.4)
RBC # BLD AUTO: 4.61 M/UL (ref 4–5.4)
SODIUM SERPL-SCNC: 140 MMOL/L (ref 136–145)
TROPONIN I SERPL DL<=0.01 NG/ML-MCNC: <0.006 NG/ML (ref 0–0.03)
WBC # BLD AUTO: 6.8 K/UL (ref 3.9–12.7)

## 2019-06-06 PROCEDURE — 93010 ELECTROCARDIOGRAM REPORT: CPT | Mod: ,,, | Performed by: INTERNAL MEDICINE

## 2019-06-06 PROCEDURE — 25000242 PHARM REV CODE 250 ALT 637 W/ HCPCS: Performed by: EMERGENCY MEDICINE

## 2019-06-06 PROCEDURE — 25000003 PHARM REV CODE 250: Performed by: EMERGENCY MEDICINE

## 2019-06-06 PROCEDURE — 93010 EKG 12-LEAD: ICD-10-PCS | Mod: ,,, | Performed by: INTERNAL MEDICINE

## 2019-06-06 PROCEDURE — 96375 TX/PRO/DX INJ NEW DRUG ADDON: CPT

## 2019-06-06 PROCEDURE — 93005 ELECTROCARDIOGRAM TRACING: CPT

## 2019-06-06 PROCEDURE — 99285 EMERGENCY DEPT VISIT HI MDM: CPT | Mod: 25

## 2019-06-06 PROCEDURE — 85025 COMPLETE CBC W/AUTO DIFF WBC: CPT

## 2019-06-06 PROCEDURE — 85379 FIBRIN DEGRADATION QUANT: CPT

## 2019-06-06 PROCEDURE — 83880 ASSAY OF NATRIURETIC PEPTIDE: CPT

## 2019-06-06 PROCEDURE — 63600175 PHARM REV CODE 636 W HCPCS: Performed by: EMERGENCY MEDICINE

## 2019-06-06 PROCEDURE — 94640 AIRWAY INHALATION TREATMENT: CPT

## 2019-06-06 PROCEDURE — 80053 COMPREHEN METABOLIC PANEL: CPT

## 2019-06-06 PROCEDURE — 96374 THER/PROPH/DIAG INJ IV PUSH: CPT

## 2019-06-06 PROCEDURE — 84484 ASSAY OF TROPONIN QUANT: CPT

## 2019-06-06 RX ORDER — KETOROLAC TROMETHAMINE 30 MG/ML
30 INJECTION, SOLUTION INTRAMUSCULAR; INTRAVENOUS
Status: COMPLETED | OUTPATIENT
Start: 2019-06-06 | End: 2019-06-06

## 2019-06-06 RX ORDER — CYCLOBENZAPRINE HCL 10 MG
10 TABLET ORAL 3 TIMES DAILY PRN
Qty: 15 TABLET | Refills: 0 | Status: SHIPPED | OUTPATIENT
Start: 2019-06-06 | End: 2019-06-11

## 2019-06-06 RX ORDER — IPRATROPIUM BROMIDE AND ALBUTEROL SULFATE 2.5; .5 MG/3ML; MG/3ML
3 SOLUTION RESPIRATORY (INHALATION)
Status: COMPLETED | OUTPATIENT
Start: 2019-06-06 | End: 2019-06-06

## 2019-06-06 RX ORDER — ASPIRIN 325 MG
325 TABLET ORAL
Status: COMPLETED | OUTPATIENT
Start: 2019-06-06 | End: 2019-06-06

## 2019-06-06 RX ORDER — ONDANSETRON 2 MG/ML
4 INJECTION INTRAMUSCULAR; INTRAVENOUS
Status: COMPLETED | OUTPATIENT
Start: 2019-06-06 | End: 2019-06-06

## 2019-06-06 RX ORDER — CYCLOBENZAPRINE HCL 10 MG
10 TABLET ORAL
Status: DISCONTINUED | OUTPATIENT
Start: 2019-06-06 | End: 2019-06-06 | Stop reason: HOSPADM

## 2019-06-06 RX ORDER — NAPROXEN 500 MG/1
500 TABLET ORAL 2 TIMES DAILY WITH MEALS
Qty: 10 TABLET | Refills: 0 | Status: SHIPPED | OUTPATIENT
Start: 2019-06-06 | End: 2019-09-05

## 2019-06-06 RX ADMIN — IPRATROPIUM BROMIDE AND ALBUTEROL SULFATE 3 ML: .5; 3 SOLUTION RESPIRATORY (INHALATION) at 10:06

## 2019-06-06 RX ADMIN — ONDANSETRON 4 MG: 2 INJECTION INTRAMUSCULAR; INTRAVENOUS at 11:06

## 2019-06-06 RX ADMIN — KETOROLAC TROMETHAMINE 30 MG: 30 INJECTION, SOLUTION INTRAMUSCULAR; INTRAVENOUS at 10:06

## 2019-06-06 RX ADMIN — ASPIRIN 325 MG ORAL TABLET 325 MG: 325 PILL ORAL at 10:06

## 2019-06-06 NOTE — ED TRIAGE NOTES
"Pt reports " I was at work and my chest started hurting, got worse when I took deep breaths and being SOB"  Pt reports a hx of PE with green  Filter, bronchitis, DM.  Pt denies using any inhaler to relieve her SOB.     "

## 2019-06-06 NOTE — ED PROVIDER NOTES
"Encounter Date: 2019    SCRIBE #1 NOTE: I, Jud Brandon, am scribing for, and in the presence of,  Harshad Willoughby MD. I have scribed the following portions of the note - Other sections scribed: HPI, ROS, PE, MDM.       History     Chief Complaint   Patient presents with    Chest Pain     Left sided chest pain that radiates to back. Pain started 2 hours into work shift. Given 325mg aspirin by EMS    Shortness of Breath     Reports hx of PE with filter placed 10 years ago while delivering child.     CC: Chest Pain    HPI: This is a 34 y.o. F who has HTN, DM, and Hx of PE who presents to the ED via EMS transportation for emergent evaluation of acute left sided CP that radiates to the back that began 2 hours PTA. Pt's CP is worse with movement and reproducible and worse with taking deep breaths. She has associated SOB. Pt states that she was diagnosed with Pneumonia a couple of months ago, which resolved after treatment. She notes no recent illness or ill contact. Additionally, the pt has a filter that was placed previously for a PE in . Her LMP was 6 years ago. Pt had a Partial Hysterectomy. No known drug allergies. No recent alcohol use or recent long travels. Pt smokes cigarettes. She last smoked marijuana 2 days ago. Pt denies fever, nasal congestion, appetite change, constipation, diarrhea, or dysuria.  Patient also has back pain that is not connected to her chest pain.  It is reproducible.    The history is provided by the patient. No  was used.     Review of patient's allergies indicates:  No Known Allergies  Past Medical History:   Diagnosis Date    Asthma     as a child    Back pain, chronic 2013    " i have herinated disk"     Diabetes mellitus     Fibromyalgia     GERD (gastroesophageal reflux disease)     Herpes simplex without mention of complication     History of pulmonary embolus during pregnancy     after her second  section    Hypertension  "    off meds x 1 year    Mental disorder     depression and migraine headaches     Past Surgical History:   Procedure Laterality Date    ABLATION, ENDOMETRIUM, THERMAL N/A 2012    [facility] at [facility] OR     SECTION      CHOLECYSTECTOMY, LAPAROSCOPIC N/A 2019    [facility]formed by [facility]illard [facility] at [facility] OR    DILATION AND CURETTAGE OF UTERUS      for elective abortions    EGD (ESOPHAGOGASTRODUODENOSCOPY) N/A 2019    [facility]    ESOPHAGOGASTRODUODENOSCOPY  2019    ESOPHAGOGASTRODUODENOSCOPY  2019    HTA  2012    Dr Jiang    HYST[facility]CTOMY  2013    [facility]    HYSTERECTOMY, SUPRACERVICAL N/A 2013    [facility] by [facility] at [facility] OR    HYSTEROSCOPY, WITH DILATION AND CURETTAGE OF UTERUS  2012    [facility] by [facility] at [facility] OR    INSERTION-FILTER-INFERIOR VENA CAVA Right 2013    Performed by [facility]in [facility] at [facility] OR    marsupialization of Bartholin's cyst in  and 2005 and     SALPINGECTOMY Left 2013    [facility] by [facility] at [facility] OR    TUBAL LIGATION  2009    uterine ablation  12     Family History   Problem Relation Age of Onset    Diabetes Mother     Hypertension Mother     Stroke Mother      Social History     Tobacco Use    Smoking status: Former Smoker     Last attempt to quit: 2015     Years since quittin.4    Smokeless tobacco: Never Used    Tobacco comment: One- 2  cigarettes  / day   Substance Use Topics    Alcohol use: Yes     Alcohol/week: 0.6 oz     Types: 1 Shots of liquor per week     Comment: rarely    Drug use: No     Review of Systems   Constitutional: Negative for appetite change, chills and fever.   HENT: Negative for ear pain and sore throat.    Eyes: Negative for pain.   Respiratory: Positive for shortness of breath. Negative for cough.    Cardiovascular: Positive for chest pain.   Gastrointestinal:  Negative for abdominal pain, constipation, diarrhea, nausea and vomiting.   Genitourinary: Negative for dysuria.   Musculoskeletal: Positive for back pain.   Skin: Negative for rash.   Neurological: Negative for headaches.       Physical Exam     Initial Vitals [06/06/19 0959]   BP Pulse Resp Temp SpO2   (!) 147/93 76 18 98.1 °F (36.7 °C) 100 %      MAP       --         Physical Exam    Nursing note and vitals reviewed.  Constitutional: She appears well-developed and well-nourished.  Non-toxic appearance. No distress.   HENT:   Head: Normocephalic and atraumatic.   Right Ear: External ear normal.   Left Ear: External ear normal.   Nose: Nose normal.   Eyes: Conjunctivae and EOM are normal. Pupils are equal, round, and reactive to light. Right conjunctiva is not injected. Left conjunctiva is not injected. No scleral icterus.   Neck: Normal range of motion. Neck supple. Carotid bruit is not present. No JVD present.   Cardiovascular: Normal rate, regular rhythm, S1 normal and S2 normal. Exam reveals no gallop and no friction rub.    No murmur heard.  Pulmonary/Chest: Effort normal. No accessory muscle usage. No respiratory distress. She has wheezes. She has no rhonchi. She has no rales.   (+) Reproducible chest pain   Abdominal: Soft. Bowel sounds are normal. She exhibits no distension and no mass. There is no hepatosplenomegaly. There is no tenderness. There is no rebound, no guarding, no CVA tenderness and negative Bradley's sign.   Musculoskeletal: Normal range of motion. She exhibits no edema.   Good active ROM of all extremities. No extremity deformity or extremity TTP, no C/T/L SPine TTP, no lower extremity edema or cyanosis.    Lymphadenopathy: No inguinal adenopathy noted on the right or left side.   Neurological: She is alert and oriented to person, place, and time. She has normal strength. She displays no tremor. No cranial nerve deficit or sensory deficit. She displays a negative Romberg sign. Gait normal.    Normal speech. Symmetric facial movements. Negative finger to nose.    Skin: Skin is warm and dry. No ecchymosis and no rash noted.   Psychiatric: She has a normal mood and affect. She expresses no suicidal plans and no homicidal plans.    No [facility]         ED Course   Procedures  Labs Reviewed   COMPREHENSIVE METABOLIC PANEL - Abnormal; Notable for the following components:       Result Value    Alkaline Phosphatase 54 (*)     All other components within normal limits   CBC W/ AUTO DIFFERENTIAL   TROPONIN I   B-TYPE NATRIURETIC PEPTIDE   D DIMER, QUANTITATIVE   TROPONIN I   TROPONIN I     EKG Readings: (Independently Interpreted)   Initial Reading: No STEMI. Previous EKG: Compared with most recent EKG Previous EKG Date: 4/22/2019. Rhythm: Normal Sinus Rhythm. Heart Rate: 83 bpm. Ectopy: No Ectopy. Conduction: Normal. ST Segments: Normal ST Segments. T Waves: Normal. Clinical Impression: Normal Sinus Rhythm     ECG Results          EKG 12-lead (In process)  Result time 06/06/19 14:38:02    In process by Interface, Lab In [facility] (06/06/19 14:38:02)                 Narrative:    Test Reason : R07.9,    Vent. Rate : 083 BPM     Atrial Rate : 083 BPM     P-R Int : 138 ms          QRS Dur : 086 ms      QT Int : 360 ms       P-R-T Axes : 040 065 061 degrees     QTc Int : 423 ms    Normal sinus rhythm  Normal ECG  When compared with ECG of 22-APR-2019 14:14,  Significant changes have occurred    Referred By: [email or web address]   SELF           Confirmed By:                   In process by Interface, Lab In [facility] (06/06/19 14:35:07)                 Narrative:    Test Reason : R07.9,    Vent. Rate : 083 BPM     Atrial Rate : 083 BPM     P-R Int : 138 ms          QRS Dur : 086 ms      QT Int : 360 ms       P-R-T Axes : 040 065 061 degrees     QTc Int : 423 ms    Normal sinus rhythm  Normal ECG  When compared with ECG of 22-APR-2019 14:14,  Significant changes have occurred    Referred By: [email or web address]   SELF           Confirmed By:                              Imaging Results          X-Ray Chest AP Portable (Final result)  Result time 06/06/19 11:25:09    Final result by Abby Bolanos MD (06/06/19 11:25:09)                 Impression:      Clear lungs.      Electronically signed by: Abby Bolanos MD  Date:    06/06/2019  Time:    11:25             Narrative:    EXAMINATION:  XR CHEST AP PORTABLE    CLINICAL HISTORY:  Chest Pain;    TECHNIQUE:  Single frontal view of the chest was performed.    COMPARISON:  Prior thoracic CT dated 04/22/2019    FINDINGS:  The mediastinal structures are midline.  The cardiac silhouette is not well evaluated due to AP technique.  The lungs appear grossly clear.    No osseous abnormalities are seen.                                 Medical Decision Making:   History:   I obtained history from: EMS provider.  Initial Assessment:   34 year old patient with hx of pulmonary embolus presenting secondary to chest pain. Patient is at lower risk of ACS due to risk factors and HPI with a heart score of 2. Patient has received an aspirin. EKG was reassuring and chest xray showed nothing acute. Most likely musculoskeletal cause of patient.  Patient also has reproducible upper back tenderness.  No rashes or lesions.    [email or web address]    Also considered but less likely:     PE: normal rate, no hypoxia.  D-dimer negative  Pneumonia: chest xray negative. No fever. No cough and lungs non consistent with pna  Tamponade: unlikely due to chest xray and ekg  STEMI: No STEMI on ekg  Dissection: equal pulses bilaterally and no ripping chest pain to the back  Esophageal rupture: no dysphagia or vomiting and chest xray negative for mediastinal air  Arrhythmia: no arrhythmia on ekg  CHF: no fluid overload on Cxr and physical exam  Pneumothorax: bilateral breath sounds and no signs of pneumothorax on chest xray     34-year-old female presenting today secondary to what appears to be  musculoskeletal back and chest pain. Ketorolac for pain.  Some minimal wheezing bilaterally.  Albuterol ordered for this.  Wheezing resolved.  Discharge impression with naproxen and Flexeril.  Also gave patient primary care follow-up.  All questions answered.  Clinical Tests:   Lab Tests: Ordered  Radiological Study: Ordered  Medical Tests: Ordered and Reviewed            Scribe Attestation:   Scribe #1: I performed the above scribed service and the documentation accurately describes the services I performed. I attest to the accuracy of the note.    Attending Attestation:           Physician Attestation for Scribe:  Physician Attestation Statement for Scribe #1: I, Harshad Willoughby MD, reviewed documentation, as scribed by Jud Brandon in my presence, and it is both accurate and complete.                    Clinical Impression:       ICD-10-CM ICD-9-CM   1. Chest wall pain R07.89 786.52   2. Chest pain R07.9 786.50   3. Acute left-sided thoracic back pain M54.6 724.1                                Harshad Willoughby MD  06/06/19 1748

## 2019-07-10 ENCOUNTER — TELEPHONE (OUTPATIENT)
Dept: OBSTETRICS AND GYNECOLOGY | Facility: CLINIC | Age: 34
End: 2019-07-10

## 2019-07-10 DIAGNOSIS — B00.9 HERPES INFECTION: ICD-10-CM

## 2019-07-10 RX ORDER — VALACYCLOVIR HYDROCHLORIDE 500 MG/1
500 TABLET, FILM COATED ORAL 2 TIMES DAILY
Qty: 30 TABLET | Refills: 1 | Status: SHIPPED | OUTPATIENT
Start: 2019-07-10 | End: 2020-04-15 | Stop reason: SDUPTHER

## 2019-07-10 NOTE — TELEPHONE ENCOUNTER
----- Message from Ingrid Gómez sent at 7/10/2019  9:30 AM CDT -----  Contact: self [phone]  .Type: Patient Call Back    Who called: self     What is the request in detail: Personal matter    Can the clinic reply by [facility]? Call back     Would the patient rather a call back or a response via [facility]? Call back     Best call back number: [phone]

## 2019-07-10 NOTE — TELEPHONE ENCOUNTER
----- Message from Ingrid Gómez sent at 7/10/2019  9:30 AM CDT -----  Contact: self [phone]  .Type: Patient Call Back    Who called: self     What is the request in detail: Personal matter    Can the clinic reply by [facility]? Call back     Would the patient rather a call back or a response via [facility]? Call back     Best call back number: [phone]

## 2019-08-13 ENCOUNTER — HOSPITAL ENCOUNTER (OUTPATIENT)
Dept: RADIOLOGY | Facility: HOSPITAL | Age: 34
Discharge: HOME | End: 2019-08-13
Attending: GENERAL PRACTICE
Payer: MEDICAID

## 2019-08-13 ENCOUNTER — TELEPHONE (OUTPATIENT)
Dept: OBSTETRICS AND GYNECOLOGY | Facility: CLINIC | Age: 34
End: 2019-08-13

## 2019-08-13 DIAGNOSIS — R10.2 PELVIC AND PERINEAL PAIN: ICD-10-CM

## 2019-08-13 DIAGNOSIS — B37.31 CANDIDA VAGINITIS: Primary | ICD-10-CM

## 2019-08-13 DIAGNOSIS — R10.2 PELVIC AND PERINEAL PAIN: Primary | ICD-10-CM

## 2019-08-13 PROCEDURE — 72170 X-RAY EXAM OF PELVIS: CPT | Mod: TC,FY

## 2019-08-13 PROCEDURE — 73521 X-RAY EXAM HIPS BI 2 VIEWS: CPT | Mod: TC,FY

## 2019-08-13 PROCEDURE — 73521 X-RAY EXAM HIPS BI 2 VIEWS: CPT | Mod: 26,,, | Performed by: RADIOLOGY

## 2019-08-13 PROCEDURE — 73521 XR HIPS BILATERAL 2 VIEW INCL AP PELVIS: ICD-10-PCS | Mod: 26,,, | Performed by: RADIOLOGY

## 2019-08-13 RX ORDER — FLUCONAZOLE 150 MG/1
150 TABLET ORAL DAILY
Qty: 1 TABLET | Refills: 0 | Status: SHIPPED | OUTPATIENT
Start: 2019-08-13 | End: 2019-08-14

## 2019-08-13 NOTE — TELEPHONE ENCOUNTER
8/13/19 @ 0948  SPOKE WITH MS BANEGAS, INFORMED HER THAT SHE HAS NOT SEEN DR STONE SINCE 2/25/19 SO SHE NEEDS TO MAKE AN APPT TO SEE HIM , NO APPT AVAILABLE TODAY , OFFERED SEVERAL OTHER APPTS WITH OTHER PHYSICIANS IN THE GROUP , PT STATED SHE WANTS IR[facility] OR DR STONE TO CALL HER   MESSAGE FORWARDED TO THEM        ----- Message from Christopher Bautista sent at 8/13/2019  9:40 AM CDT -----  Contact: Self  Type: Patient Call Back    Who called:self    What is the request in detail: patient would like medication called to [facility] for a yeast infection     Can the clinic reply by [facility]?no    Would the patient rather a call back or a response via [facility]? call    Best call back number: [phone]    Additional Information:[facility]/pharmacy #[id number] - [place] - [address] [phone] (Phone)  [phone] (Fax)

## 2019-09-05 ENCOUNTER — OFFICE VISIT (OUTPATIENT)
Dept: OBSTETRICS AND GYNECOLOGY | Facility: CLINIC | Age: 34
End: 2019-09-05
Payer: MEDICAID

## 2019-09-05 VITALS
WEIGHT: 227.75 LBS | BODY MASS INDEX: 34.52 KG/M2 | HEIGHT: 68 IN | DIASTOLIC BLOOD PRESSURE: 64 MMHG | SYSTOLIC BLOOD PRESSURE: 108 MMHG

## 2019-09-05 DIAGNOSIS — F32.A DEPRESSION, UNSPECIFIED DEPRESSION TYPE: ICD-10-CM

## 2019-09-05 DIAGNOSIS — F41.9 ANXIETY: ICD-10-CM

## 2019-09-05 DIAGNOSIS — Z01.419 WELL WOMAN EXAM WITH ROUTINE GYNECOLOGICAL EXAM: Primary | ICD-10-CM

## 2019-09-05 DIAGNOSIS — M51.26 HERNIATED LUMBAR INTERVERTEBRAL DISC: ICD-10-CM

## 2019-09-05 PROBLEM — K80.20 GALLSTONES: Status: RESOLVED | Noted: 2019-04-11 | Resolved: 2019-09-05

## 2019-09-05 PROCEDURE — 99213 OFFICE O/P EST LOW 20 MIN: CPT | Mod: PBBFAC | Performed by: OBSTETRICS & GYNECOLOGY

## 2019-09-05 PROCEDURE — 99999 PR PBB SHADOW E&M-EST. PATIENT-LVL III: ICD-10-PCS | Mod: PBBFAC,,, | Performed by: OBSTETRICS & GYNECOLOGY

## 2019-09-05 PROCEDURE — 99999 PR PBB SHADOW E&M-EST. PATIENT-LVL III: CPT | Mod: PBBFAC,,, | Performed by: OBSTETRICS & GYNECOLOGY

## 2019-09-05 PROCEDURE — 99395 PREV VISIT EST AGE 18-39: CPT | Mod: S$PBB,,, | Performed by: OBSTETRICS & GYNECOLOGY

## 2019-09-05 PROCEDURE — 99395 PR PREVENTIVE VISIT,EST,18-39: ICD-10-PCS | Mod: S$PBB,,, | Performed by: OBSTETRICS & GYNECOLOGY

## 2019-09-05 RX ORDER — IBUPROFEN 800 MG/1
TABLET ORAL
Refills: 0 | COMMUNITY
Start: 2019-08-29 | End: 2021-02-12

## 2019-09-05 RX ORDER — LOSARTAN POTASSIUM AND HYDROCHLOROTHIAZIDE 25; 100 MG/1; MG/1
1 TABLET ORAL DAILY
Refills: 3 | COMMUNITY
Start: 2019-08-26 | End: 2021-01-19

## 2019-09-05 RX ORDER — BUPROPION HYDROCHLORIDE 300 MG/1
300 TABLET ORAL DAILY
Qty: 30 TABLET | Refills: 6 | Status: SHIPPED | OUTPATIENT
Start: 2019-09-05 | End: 2021-01-19

## 2019-09-05 RX ORDER — PANTOPRAZOLE SODIUM 40 MG/1
40 TABLET, DELAYED RELEASE ORAL DAILY
Refills: 5 | COMMUNITY
Start: 2019-05-30 | End: 2021-02-12

## 2019-09-05 RX ORDER — ERGOCALCIFEROL 1.25 MG/1
CAPSULE ORAL
Refills: 1 | COMMUNITY
Start: 2019-07-07 | End: 2021-01-19

## 2019-09-05 RX ORDER — PREGABALIN 300 MG/1
CAPSULE ORAL
Refills: 3 | COMMUNITY
Start: 2019-08-01

## 2019-09-05 RX ORDER — AMOXICILLIN 500 MG/1
CAPSULE ORAL
Refills: 0 | COMMUNITY
Start: 2019-08-29 | End: 2021-01-19

## 2019-09-05 RX ORDER — HYDROCODONE BITARTRATE AND ACETAMINOPHEN 10; 325 MG/1; MG/1
TABLET ORAL
Refills: 0 | COMMUNITY
Start: 2019-08-29 | End: 2021-01-19

## 2019-09-05 RX ORDER — LORAZEPAM 1 MG/1
1 TABLET ORAL EVERY 6 HOURS PRN
Qty: 20 TABLET | Refills: 0 | Status: SHIPPED | OUTPATIENT
Start: 2019-09-05 | End: 2020-05-01 | Stop reason: SDUPTHER

## 2019-09-05 NOTE — PROGRESS NOTES
"  Subjective:       Patient ID: Viviana Aguayo is a 34 y.o. female.    Chief Complaint:  Gynecologic Exam (Last normal pap was 10/25/17)      History of Present Illness  HPI  Annual Exam-Premenopausal  Patient presents for annual exam. The patient has no complaints today except for chronic back pain. The patient is sexually active. GYN screening history: last pap: approximate date 10/25/2017 and was normal. The patient wears seatbelts: yes. The patient participates in regular exercise: no. Has the patient ever been transfused or tattooed?: yes. The patient reports that there is not domestic violence in her life.    Status post supracervical abdominal hysterectomy with left salpingectomy with extensive lysis of adhesions for pelvic pain, dysmenorrhea, menorrhagia.  Failed endometrial ablation.  Status post pulmonary embolus after her second  section.  Status post IVC filter placement by Dr Torres    Status post laparoscopic cholecystectomy by Dr Dillard.    Told previously to have a herniated disc in her back.  Now with back and  Leg pain bilaterally.    Now also with diabetes mellitus, type 2.  Doing better on Victoza.  Chronic hypertension  Anxiety and depression.  Doing well on Wellbutrin and occasional Ativan.      GYN & OB History  Patient's last menstrual period was 2013.   Date of Last Pap: 10/30/2017    OB History    Para Term  AB Living   3 2 2   1 2   SAB TAB Ectopic Multiple Live Births           2      # Outcome Date GA Lbr Doug/2nd Weight Sex Delivery Anes PTL Lv   3 Term 09 40w0d  3.714 kg (8 lb 3 oz) M Vag-Spont EPI N CLIVE   2 AB 2006           1 Term 01 40w0d  3.657 kg (8 lb 1 oz) F Vag-Spont EPI N CLIVE     Past Medical History:   Diagnosis Date    Asthma     as a child    Back pain, chronic 2013    " i have herinated disk"     Diabetes mellitus     Fibromyalgia     GERD (gastroesophageal reflux disease)     Herpes simplex without mention of " complication     History of pulmonary embolus during pregnancy 2009    after her second  section    Hypertension     off meds x 1 year    Mental disorder     depression and migraine headaches       Past Surgical History:   Procedure Laterality Date    ABLATION, ENDOMETRIUM, THERMAL N/A 2012    [facility] OR     SECTION      CHOLECYSTECTOMY, LAPAROSCOPIC N/A 2019    [facility] Henry [facility]illard [facility] at [facility] OR    DILATION AND CURETTAGE OF UTERUS      for elective abortions    EGD (ESOPHAGOGASTRODUODENOSCOPY) N/A 2019    [facility]    ESOPHAGOGASTRODUODENOSCOPY  2019    ESOPHAGOGASTRODUODENOSCOPY  2019    HTA  2012    Dr Jiang    HYSTERECTOMY  2013    [facility]    HYSTERECTOMY, SUPRACERVICAL N/A 2013    [facility] at [facility] OR    HYSTEROSCOPY, WITH DILATION AND CURETTAGE OF UTERUS  2012    [facility] at [facility] OR    INSERTION-FILTER-INFERIOR VENA CAVA Right 2013    Performed by Konstantin Torres [facility] at [facility] OR    marsupialization of Bartholin's cyst in  and 2005 and     SALPINGECTOMY Left 2013    [facility] at [facility] OR    TUBAL LIGATION      uterine ablation  12       Family History   Problem Relation Age of Onset    Diabetes Mother     Hypertension Mother     Stroke Mother        Social History     Socioeconomic History    Marital status: Single     Spouse name: Not on file    Number of children: Not on file    Years of education: Not on file    Highest education level: Not on file   Occupational History    Not on file   Social Needs    Financial resource strain: Not on file    Food insecurity:     Worry: Not on file     Inability: Not on file    Transportation needs:     Medical: Not on file     Non-medical: Not on file   Tobacco Use    Smoking status: Former Smoker     Last  attempt to quit: 2015     Years since quittin.6    Smokeless tobacco: Never Used    Tobacco comment: One- 2  cigarettes  / day   Substance and Sexual Activity    Alcohol use: Yes     Alcohol/week: 0.6 oz     Types: 1 Shots of liquor per week     Comment: rarely    Drug use: No    Sexual activity: Yes     Partners: Male     Birth control/protection: Surgical   Lifestyle    Physical activity:     Days per week: Not on file     Minutes per session: Not on file    Stress: Not on file   Relationships    Social connections:     Talks on phone: Not on file     Gets together: Not on file     Attends Protestant service: Not on file     Active member of club or organization: Not on file     Attends meetings of clubs or organizations: Not on file     Relationship status: Not on file   Other Topics Concern    Not on file   Social History Narrative    Working as a make-up artist    New partner for the past two        Current Outpatient Medications   Medication Sig Dispense Refill    amoxicillin (AMOXIL) 500 MG capsule TAKE 1 CAPSULE BY MOUTH EVERY 6 HOURS AS NEEDED FOR 7 DAYS  0    ascorbic acid, vitamin C, (VITAMIN C) 1000 MG tablet Take 1,000 mg by mouth once daily.      bisacodyl (DULCOLAX) 5 mg EC tablet Take 5 mg by mouth daily as needed for Constipation.      buPROPion (WELLBUTRIN XL) 300 MG 24 hr tablet Take 1 tablet (300 mg total) by mouth once daily. 30 tablet 6    docusate sodium (COLACE) 100 MG capsule Take 100 mg by mouth 2 (two) times daily.      ergocalciferol (ERGOCALCIFEROL) 50,000 unit Cap TAKE ONE CAPSULE BY MOUTH ONE TIME PER WEEK  1    HYDROcodone-acetaminophen (NORCO)  mg per tablet TAKE 1 TABLET BY MOUTH EVERY 4 - 6 HOURS AS NEEDED FOR PAIN  0    ibuprofen (ADVIL,MOTRIN) 800 MG tablet TAKE 1 TABLET BY MOUTH EVERY 6 - 8 HOURS AS NEEDED FOR PAIN  0    loratadine (CLARITIN) 10 mg tablet       LORazepam (ATIVAN) 1 MG tablet Take 1 tablet (1 mg total) by mouth every 6 (six)  hours as needed for Anxiety. 20 tablet 0    losartan-hydrochlorothiazide 100-25 mg (HYZAAR) 100-25 mg per tablet Take 1 tablet by mouth once daily.  3    ondansetron (ZOFRAN) 4 MG tablet Take 1 tablet (4 mg total) by mouth every 12 (twelve) hours as needed. 12 tablet 0    pantoprazole (PROTONIX) 40 MG tablet Take 40 mg by mouth once daily.  5    pregabalin (LYRICA) 300 MG Cap TAKE 1 CAPSULE BY MOUTH TWICE A DAY  3    ranitidine (ZANTAC) 150 MG tablet Take 2 tablets (300 mg total) by mouth nightly. 60 tablet 11    TRUERESULT BLOOD GLUCOSE SYSTM kit USE WITH TEST STRIPS THREE TIMES A DAY  0    TRUETEST TEST STRIPS Strp TEST BLOOD GLUCOSE THREE TIMES A DAY  5    ULTRA THIN LANCETS 30 gauge Misc USE TO TEST BLOOD GLUCOSE THREE TIMES A DAY  5    valACYclovir (VALTREX) 500 MG tablet Take 1 tablet (500 mg total) by mouth 2 (two) times daily. 30 tablet 1    VICTOZA 3-GIANNA 0.6 mg/0.1 mL (18 mg/3 mL) PnIj INJECT 1.8 MG EVERY DAY BY SUBCUTANEOUS ROUTE.  3     No current facility-administered medications for this visit.      Facility-Administered Medications Ordered in Other Visits   Medication Dose Route Frequency Provider Last Rate Last Dose    lactated ringers infusion   Intravenous Continuous Doni Marcos MD 75 mL/hr at 02/11/19 0956      sodium chloride 0.9% flush 3 mL  3 mL Intravenous PRN Doni Marcos MD           Review of patient's allergies indicates:  No Known Allergies    Review of Systems  Review of Systems   Constitutional: Positive for activity change, appetite change, fatigue and unexpected weight change. Negative for chills and fever.   HENT: Negative for mouth sores.    Respiratory: Negative for cough, shortness of breath and wheezing.    Cardiovascular: Negative for chest pain and palpitations.   Gastrointestinal: Negative for abdominal pain, bloating, blood in stool, constipation, nausea and vomiting.   Endocrine: Negative for diabetes and hot flashes.   Genitourinary: Negative for  dysmenorrhea, dyspareunia, dysuria, frequency, hematuria, menorrhagia, menstrual problem, pelvic pain, urgency, vaginal bleeding, vaginal discharge, vaginal pain, urinary incontinence, postcoital bleeding and vaginal odor.   Musculoskeletal: Positive for back pain and leg pain. Negative for myalgias.   Integumentary:  Negative for rash, breast mass and nipple discharge.   Neurological: Negative for seizures and headaches.   Psychiatric/Behavioral: Positive for depression and sleep disturbance. The patient is nervous/anxious.         Crying spells.  Irritable.   Breast: Negative for mass, mastodynia and nipple discharge          Objective:    Physical Exam:   Constitutional: She appears well-developed and well-nourished. No distress.    HENT:   Head: Normocephalic and atraumatic.    Eyes: EOM are normal.    Neck: Normal range of motion.     Pulmonary/Chest: Effort normal. No respiratory distress.   Breasts: Non-tender, no engorgement, no masses, no retraction, no discharge. Negative for lymphadenopathy.         Abdominal: Soft. She exhibits no distension. There is no tenderness. There is no rebound and no guarding.   Pfannenstiel scar      Genitourinary: Vagina normal. No vaginal discharge found.   Genitourinary Comments: No atrophy.  Vulva without any obvious lesions.  Vaginal vault with good support.  Minimal white discharge noted.  No obvious lesion other than being dry.  Vaginal cuff intact and well-supported.  Cervix and Uterus are surgically absent.  Adnexa is without any masses or tenderness.           Musculoskeletal: Normal range of motion.       Neurological: She is alert.    Skin: Skin is warm and dry.    Psychiatric: She has a normal mood and affect.          Assessment:        1. Well woman exam with routine gynecological exam    2. Herniated lumbar intervertebral disc    3. Depression, unspecified depression type    4. Anxiety              Plan:          I have discussed with the patient her condition.   Monthly breast examination was instructed, discussed, and encouraged.  Patient was encouraged to consume a low-calorie, low fat diet, and to increase of physical activity.  Healthy habits encouraged.  A Pap smear was NOT performed along with HR-HPV according to the USPSTF recommendations.  Mammogram was not ordered because of the combination of her age and risk factors, according to ACOG guidelines.  Gonorrhea and Chlamydia testing not performed;  HIV test not ordered, again according to guidelines.  Colonoscopy discussed according to ACS guideline.  We also discussed her obstetric history and CV risks.   Patient is to continue her medications as prescribed.  Refills for Wellbutrin and Ativan given.  She will come back to see me in one year for her annual visit.  She can come back to see me sooner as necessary.  All of her questions were answered appropriately to her satisfaction.    We discussed possible diagnoses and management of her chronic back pain.  Will refer to Neurology for possible work-up

## 2019-09-09 ENCOUNTER — TELEPHONE (OUTPATIENT)
Dept: OBSTETRICS AND GYNECOLOGY | Facility: CLINIC | Age: 34
End: 2019-09-09

## 2019-09-09 DIAGNOSIS — R11.0 NAUSEA: ICD-10-CM

## 2019-09-09 DIAGNOSIS — B37.31 CANDIDA VAGINITIS: Primary | ICD-10-CM

## 2019-09-09 RX ORDER — PROMETHAZINE HYDROCHLORIDE 25 MG/1
25 TABLET ORAL EVERY 6 HOURS PRN
Qty: 30 TABLET | Refills: 1 | Status: SHIPPED | OUTPATIENT
Start: 2019-09-09 | End: 2021-01-19

## 2019-09-09 RX ORDER — FLUCONAZOLE 150 MG/1
150 TABLET ORAL DAILY
Qty: 1 TABLET | Refills: 0 | Status: SHIPPED | OUTPATIENT
Start: 2019-09-09 | End: 2019-09-10

## 2019-09-09 NOTE — TELEPHONE ENCOUNTER
----- Message from Ric Carr sent at 9/9/2019  2:01 PM CDT -----  Contact: Self: [phone]  Type: Patient Call Back    Who called:Self    What is the request in detail:Patient stated Diflucan and nausea medication was never sent to her pharmacy  Can the clinic reply by [facility]?   NO  Would the patient rather a call back or a response via [facility]? Callback    Best call back number:[phone]

## 2020-04-08 ENCOUNTER — TELEPHONE (OUTPATIENT)
Dept: OBSTETRICS AND GYNECOLOGY | Facility: CLINIC | Age: 35
End: 2020-04-08

## 2020-04-08 NOTE — TELEPHONE ENCOUNTER
----- Message from Quintinbernardinomona Pabon sent at 4/8/2020 12:19 PM CDT -----  Contact: Viviana [phone]  Type:  Sooner Appointment Request    Patient is requesting a sooner appointment.  Patient declined first available appointment listed as well as another facility and provider .  Patient will not accept being placed on the waitlist and is requesting a message be sent to doctor.    Name of Caller: Viviana    When is the first available appointment? 06/01    Symptoms: no symptoms, patient wants to come in and complete her annual wellness and pap smear and also speak to the doctor in regards to a medication    Would the patient rather a call back or a response via [facility]? Call back     Best Call Back Number: [phone]    Additional Information: (#The patient was offered Dr. Jiang's next appt in June. The patient does not want to wait that long. She also has some questions to ask. She stated that she would like to be seen sooner if possible.#)          Attempted to contact pt to discuss appt options. Patient did not answer and there Is no voicemail set up

## 2020-04-15 DIAGNOSIS — B00.9 HERPES INFECTION: ICD-10-CM

## 2020-04-15 RX ORDER — VALACYCLOVIR HYDROCHLORIDE 500 MG/1
500 TABLET, FILM COATED ORAL 2 TIMES DAILY
Qty: 30 TABLET | Refills: 1 | Status: SHIPPED | OUTPATIENT
Start: 2020-04-15 | End: 2021-01-19

## 2020-04-15 NOTE — TELEPHONE ENCOUNTER
----- Message from Ingrid Dalia sent at 4/15/2020  9:17 AM CDT -----  Contact: self [phone]  .Type: RX Refill Request    Who Called: self     Have you contacted your pharmacy: no    Refill or New Rx: refill     RX Name and Strength: valACYclovir (VALTREX) 500 MG tablet    Is this a 30 day or 90 day RX: 30 day    Preferred Pharmacy with phone number: .  [facility]/pharmacy #[id number] - [facility] [id number] [facility] [address]  Phone: [phone] Fax: [phone]    Local or Mail Order: local    Would the patient rather a call back or a response via [facility]?  Call back     Best Call Back Number: [phone]

## 2020-05-01 ENCOUNTER — HOSPITAL ENCOUNTER (EMERGENCY)
Facility: HOSPITAL | Age: 35
Discharge: HOME | End: 2020-05-01
Attending: EMERGENCY MEDICINE
Payer: MEDICAID

## 2020-05-01 VITALS
WEIGHT: 230 LBS | OXYGEN SATURATION: 98 % | HEIGHT: 68 IN | BODY MASS INDEX: 34.86 KG/M2 | DIASTOLIC BLOOD PRESSURE: 90 MMHG | SYSTOLIC BLOOD PRESSURE: 143 MMHG | HEART RATE: 104 BPM | TEMPERATURE: 98 F | RESPIRATION RATE: 20 BRPM

## 2020-05-01 DIAGNOSIS — R09.A2 GLOBUS HYSTERICUS: ICD-10-CM

## 2020-05-01 DIAGNOSIS — R00.0 SINUS TACHYCARDIA: Primary | ICD-10-CM

## 2020-05-01 DIAGNOSIS — F41.9 ANXIETY: ICD-10-CM

## 2020-05-01 DIAGNOSIS — R29.0 CARPOPEDAL SPASM: ICD-10-CM

## 2020-05-01 DIAGNOSIS — F45.8 HYPERVENTILATION SYNDROME: ICD-10-CM

## 2020-05-01 DIAGNOSIS — R20.2 PARESTHESIA OF BOTH FEET: ICD-10-CM

## 2020-05-01 LAB
ALBUMIN SERPL BCP-MCNC: 4.7 G/DL (ref 3.5–5.2)
ALP SERPL-CCNC: 66 U/L (ref 55–135)
ALT SERPL W/O P-5'-P-CCNC: 30 U/L (ref 10–44)
ANION GAP SERPL CALC-SCNC: 16 MMOL/L (ref 8–16)
AST SERPL-CCNC: 21 U/L (ref 10–40)
BASOPHILS # BLD AUTO: 0.05 K/UL (ref 0–0.2)
BASOPHILS NFR BLD: 0.6 % (ref 0–1.9)
BILIRUB SERPL-MCNC: 0.4 MG/DL (ref 0.1–1)
BNP SERPL-MCNC: <10 PG/ML (ref 0–99)
BUN SERPL-MCNC: 6 MG/DL (ref 6–20)
CALCIUM SERPL-MCNC: 11 MG/DL (ref 8.7–10.5)
CHLORIDE SERPL-SCNC: 102 MMOL/L (ref 95–110)
CO2 SERPL-SCNC: 23 MMOL/L (ref 23–29)
CREAT SERPL-MCNC: 1 MG/DL (ref 0.5–1.4)
DIFFERENTIAL METHOD: NORMAL
EOSINOPHIL # BLD AUTO: 0.1 K/UL (ref 0–0.5)
EOSINOPHIL NFR BLD: 0.6 % (ref 0–8)
ERYTHROCYTE [DISTWIDTH] IN BLOOD BY AUTOMATED COUNT: 12.5 % (ref 11.5–14.5)
EST. GFR  (AFRICAN AMERICAN): >60 ML/MIN/1.73 M^2
EST. GFR  (NON AFRICAN AMERICAN): >60 ML/MIN/1.73 M^2
GLUCOSE SERPL-MCNC: 111 MG/DL (ref 70–110)
HCT VFR BLD AUTO: 43.7 % (ref 37–48.5)
HGB BLD-MCNC: 14.9 G/DL (ref 12–16)
IMM GRANULOCYTES # BLD AUTO: 0.02 K/UL (ref 0–0.04)
IMM GRANULOCYTES NFR BLD AUTO: 0.3 % (ref 0–0.5)
LYMPHOCYTES # BLD AUTO: 2.6 K/UL (ref 1–4.8)
LYMPHOCYTES NFR BLD: 33.7 % (ref 18–48)
MCH RBC QN AUTO: 29.1 PG (ref 27–31)
MCHC RBC AUTO-ENTMCNC: 34.1 G/DL (ref 32–36)
MCV RBC AUTO: 85 FL (ref 82–98)
MONOCYTES # BLD AUTO: 0.7 K/UL (ref 0.3–1)
MONOCYTES NFR BLD: 8.8 % (ref 4–15)
NEUTROPHILS # BLD AUTO: 4.4 K/UL (ref 1.8–7.7)
NEUTROPHILS NFR BLD: 56 % (ref 38–73)
NRBC BLD-RTO: 0 /100 WBC
PLATELET # BLD AUTO: 258 K/UL (ref 150–350)
PMV BLD AUTO: 11.5 FL (ref 9.2–12.9)
POTASSIUM SERPL-SCNC: 3.5 MMOL/L (ref 3.5–5.1)
PROT SERPL-MCNC: 8.7 G/DL (ref 6–8.4)
RBC # BLD AUTO: 5.12 M/UL (ref 4–5.4)
SARS-COV-2 RDRP RESP QL NAA+PROBE: NEGATIVE
SODIUM SERPL-SCNC: 141 MMOL/L (ref 136–145)
WBC # BLD AUTO: 7.83 K/UL (ref 3.9–12.7)

## 2020-05-01 PROCEDURE — 93010 EKG 12-LEAD: ICD-10-PCS | Mod: ,,, | Performed by: INTERNAL MEDICINE

## 2020-05-01 PROCEDURE — 80053 COMPREHEN METABOLIC PANEL: CPT

## 2020-05-01 PROCEDURE — 93010 ELECTROCARDIOGRAM REPORT: CPT | Mod: ,,, | Performed by: INTERNAL MEDICINE

## 2020-05-01 PROCEDURE — 96374 THER/PROPH/DIAG INJ IV PUSH: CPT

## 2020-05-01 PROCEDURE — 85025 COMPLETE CBC W/AUTO DIFF WBC: CPT

## 2020-05-01 PROCEDURE — 93005 ELECTROCARDIOGRAM TRACING: CPT

## 2020-05-01 PROCEDURE — U0002 COVID-19 LAB TEST NON-CDC: HCPCS

## 2020-05-01 PROCEDURE — 99285 EMERGENCY DEPT VISIT HI MDM: CPT | Mod: 25

## 2020-05-01 PROCEDURE — 63600175 PHARM REV CODE 636 W HCPCS: Performed by: EMERGENCY MEDICINE

## 2020-05-01 PROCEDURE — 83880 ASSAY OF NATRIURETIC PEPTIDE: CPT

## 2020-05-01 RX ORDER — LORAZEPAM 2 MG/ML
1 INJECTION INTRAMUSCULAR
Status: COMPLETED | OUTPATIENT
Start: 2020-05-01 | End: 2020-05-01

## 2020-05-01 RX ORDER — LORAZEPAM 1 MG/1
1 TABLET ORAL EVERY 6 HOURS PRN
Qty: 8 TABLET | Refills: 0 | Status: SHIPPED | OUTPATIENT
Start: 2020-05-01 | End: 2020-05-07

## 2020-05-01 RX ADMIN — LORAZEPAM 1 MG: 2 INJECTION INTRAMUSCULAR; INTRAVENOUS at 01:05

## 2020-05-01 NOTE — ED PROVIDER NOTES
"Encounter Date: 5/1/2020    SCRIBE #1 NOTE: I, Ashia Bryant, am scribing for, and in the presence of,  Erik Ulrich MD. I have scribed the following portions of the note - Other sections scribed: HPI, ROS.       History     Chief Complaint   Patient presents with    Shortness of Breath     pt. reports SOB for the past 3 days. pt. reports she thinks she has COVID, pt. reports she has a hx of anxiety. pt. reports she has not been able to taste and smells, " some type of infection".     CC: Shortness of Breath    HPI: This 34 y.o female, with a medical history of asthma, diabetes mellitus, fibromyalgia, GERD, pulmonary embolus during pregnancy, hypertension, and mental disorder, presents to the ED c/o acute, constant shortness of breath. Pt reports that she initially began to experience body aches, loss of smell and taste, loss of appetite, sweats and an intermittent cough on 4/1/2020. She states that the symptoms began a few days after caring for her boyfriend who was also experiencing similar symptoms. She notes that she nor her boyfriend were tested for COVID-19, however, she notes that her boyfriend's mother tested positive for COVID. Pt reports that although her symptoms had improved, she notes that she does not "feel myself lately" as she continues to experience chills, sweats and a loss of smell (but can smell a "wiff of infection"). She states that she has also been experiencing shortness of breath (not "breathing clearly"), a frontal headache, a tingling sensation to the bilateral legs and hands, bilateral leg pain (described as "achy"), a foreign body sensation to the throat, nausea and loose stool. She adds that she has a feeling of "disruption" in her chest as she states that the chest feels "open." Additionally, pt reports that she has been feeling anxious, noting that she took an anxiety pill last week for treatment. She also notes use of breathing treatments as well as an inhaler. Pt denies cough, " "fever, ear pain, eye pain, sore throat, or chest pain. No other associated symptoms.     The history is provided by the patient.     Review of patient's allergies indicates:  No Known Allergies  Past Medical History:   Diagnosis Date    Asthma     as a child    Back pain, chronic 2013    " i have herinated disk"     Diabetes mellitus     Fibromyalgia     GERD (gastroesophageal reflux disease)     Herpes simplex without mention of complication     History of pulmonary embolus during pregnancy 2009    after her second  section    Hypertension     off meds x 1 year    Mental disorder     depression and migraine headaches     Past Surgical History:   Procedure Laterality Date     SECTION      DILATION AND CURETTAGE OF UTERUS      for elective abortions    ESOPHAGOGASTRODUODENOSCOPY  2019    ESOPHAGOGASTRODUODENOSCOPY  2019    ESOPHAGOGASTRODUODENOSCOPY N/A 2019    Procedure: EGD (ESOPHAGOGASTRODUODENOSCOPY);  Surgeon: Doni Marcos MD;  Location[facility] ENDO;  Service: Endoscopy;  Laterality: N/A;    HTA  2012    Dr Jiang    HYSTERECTOMY  2013    [facility]    LAPAROSCOPIC CHOLECYSTECTOMY N/A 2019    Procedure: CHOLECYSTECTOMY, LAPAROSCOPIC;  Surgeon: Henry Dillard MD;  Location: [facility] OR;  Service: General;  Laterality: N/A;  RN PRE OP 19    marsupialization of Bartholin's cyst in  and 2005 and     TUBAL LIGATION  2009    uterine ablation  12     Family History   Problem Relation Age of Onset    Diabetes Mother     Hypertension Mother     Stroke Mother      Social History     Tobacco Use    Smoking status: Former Smoker     Last attempt to quit: 2015     Years since quittin.3    Smokeless tobacco: Never Used    Tobacco comment: One- 2  cigarettes  / day   Substance Use Topics    Alcohol use: Yes     Alcohol/week: 1.0 standard drinks     Types: 1 Shots of liquor per week     Comment: rarely    Drug " "use: Yes     Types: Marijuana     Review of Systems   Constitutional: Positive for chills. Negative for fever.        (+) sweats   HENT: Negative for congestion, ear pain, rhinorrhea and sore throat.         (+) loss of smell; (+) foreign body sensation to the throat (feels as though something is stuck in the throat)   Eyes: Negative for pain.   Respiratory: Positive for shortness of breath. Negative for cough.    Cardiovascular: Negative for chest pain.        (+) "disruption" in chest   Gastrointestinal: Positive for nausea. Negative for abdominal pain, diarrhea and vomiting.        (+) loose stool   Genitourinary: Negative for dysuria, frequency and hematuria.   Musculoskeletal: Negative for back pain.        (+) bilateral leg pain   Skin: Negative for rash.   Neurological: Positive for headaches (frontal). Negative for dizziness and weakness.        (+) tingling sensation to the bilateral legs as well as to the bilateral hands   Psychiatric/Behavioral: The patient is nervous/anxious.        Physical Exam     Initial Vitals [05/01/20 1132]   BP Pulse Resp Temp SpO2   (!) 186/99 110 18 98.6 °F (37 °C) 100 %      MAP       --         Physical Exam  The patient was examined specifically for the following:   General:No significant distress, Good color, Warm and dry. Head and neck:Scalp atraumatic, Neck supple. Neurological:Appropriate conversation, Gross motor deficits. Eyes:Conjugate gaze, Clear corneas. ENT: No epistaxis. Cardiac: Regular rate and rhythm, Grossly normal heart tones. Pulmonary: Wheezing, Rales. Gastrointestinal: Abdominal tenderness, Abdominal distention. Musculoskeletal: Extremity deformity, Apparent pain with range of motion of the joints. Skin: Rash.   The findings on examination were normal except for the following:  The patient is extremely anxious.  She is tearful during the physical exam.  She is almost tremulous.  Her heart rate is 110.  Oxygen saturations are 100%.  The lungs are clear and " free of wheezing rales or rhonchi.  The patient has brisk pulses in both lower extremities.  The abdomen is nontender.  ED Course   Procedures  Labs Reviewed   COMPREHENSIVE METABOLIC PANEL - Abnormal; Notable for the following components:       Result Value    Glucose 111 (*)     Calcium 11.0 (*)     Total Protein 8.7 (*)     All other components within normal limits   CBC W/ AUTO DIFFERENTIAL   B-TYPE NATRIURETIC PEPTIDE   SARS-COV-2 RNA AMPLIFICATION, QUAL    Narrative:     What symptom criteria does the patient meet?->Shortness of  breath or difficulty breathing  What symptom criteria does the patient meet?->New loss of  taste or smell     EKG Readings: (Independently Interpreted)   This patient is in a sinus tachycardia with a heart rate of 117.  The axis is normal.  There are no significant ST segment or T-wave changes.  There is no evidence of acute myocardial infarction or malignant arrhythmia.     ECG Results          EKG 12-lead (Final result)  Result time 05/03/20 20:18:52    Final result by Interface, Lab In [facility] (05/03/20 20:18:52)                 Narrative:    Test Reason : R06.02,    Vent. Rate : 117 BPM     Atrial Rate : 117 BPM     P-R Int : 138 ms          QRS Dur : 086 ms      QT Int : 324 ms       P-R-T Axes : 065 046 046 degrees     QTc Int : 451 ms    Sinus tachycardia  Otherwise normal ECG  When compared with ECG of 06-JUN-2019 09:54,  No significant change was found  Confirmed by Kendy GOLDSMITH, Lillie ADAMS ([id number]) on 5/3/2020 8:18:43 PM    Referred By: [email or web address]   SELF           Confirmed By:Lillie Larry MD                            Imaging Results          X-Ray Chest AP Portable (Final result)  Result time 05/01/20 13:04:13    Final result by Yogesh Correa MD (05/01/20 13:04:13)                 Impression:      No acute abnormality.      Electronically signed by: Yogesh Correa MD  Date:    05/01/2020  Time:    13:04             Narrative:    EXAMINATION:  XR CHEST AP PORTABLE    CLINICAL  HISTORY:  chest pain;    TECHNIQUE:  Single frontal view of the chest was performed.    COMPARISON:  06/06/2019    FINDINGS:  The lungs are clear with normal appearance of pulmonary vasculature. No pleural effusion. No evident pneumothorax.    The cardiac silhouette is normal in size. The hilar and mediastinal contours are unremarkable.    Bones are intact.                              Medical decision making:  Given the above, this patient is concerned that she has COVID.  She has tingling in her hands and feet.  She feels like something stuck in her throat.  She is not breathing clearly.  There is no history of real air hunger.  Patient also has tingling in the hands and feet.  She reports that her fingerstick together when her symptoms get severe.  I believe this patient is having hyperventilation anxiety.  COVID testing is negative chest x-ray is unremarkable.  Diagnostic studies are otherwise unremarkable.  She does have a sinus tachycardia.  I believe this is anxiety.  She is perc negative except for the sinus tachycardia which I believe is more likely caused by anxiety.  I will discharge to outpatient evaluation and treatment.  I will use Ativan.                Scribe Attestation:   Scribe #1: I performed the above scribed service and the documentation accurately describes the services I performed. I attest to the accuracy of the note.                          Clinical Impression:       ICD-10-CM ICD-9-CM   1. Sinus tachycardia R00.0 427.89   2. Anxiety F41.9 300.00   3. Carpopedal spasm R29.0 781.7   4. Globus hystericus F45.8 300.11   5. Paresthesia of both feet R20.2 782.0   6. Hyperventilation syndrome F45.8 306.1             ED Disposition Condition    Discharge Stable        ED Prescriptions     Medication Sig Dispense Start Date End Date Auth. Provider    LORazepam (ATIVAN) 1 MG tablet Take 1 tablet (1 mg total) by mouth every 6 (six) hours as needed for Anxiety. 8 tablet 5/1/2020  Erik Ulrich MD         Follow-up Information     Follow up With Specialties Details Why Contact Info    Vinod Tiwari III, MD Internal Medicine In 2 days  [address]  [facility]  [place] [address]  [phone]                        I personally performed the services described in this documentation.  All medical record  entries made by the scribe are at my direction and in my presence.  Signed, Dr. Mojgan Ulrich MD  05/04/20 1944

## 2020-05-01 NOTE — DISCHARGE INSTRUCTIONS
Please return immediately if you get worse or if new problems develop.  Please follow-up with your primary care doctor this week.  Rest.  Ativan for anxiety.  Rest.

## 2020-05-01 NOTE — ED TRIAGE NOTES
Pt arrives to er via personal vehicle pt aaox4 no distress. Pt reports Shortness of Breath  for the past 3 days, states feel like something lodged in right side of throat,  pt. reports she thinks she has COVId per she  reports she has not been able to taste and smells. family tested positive for covid., pt. reports she has a hx of anxiety, Pe 2009, and filter placed 2013

## 2020-05-02 ENCOUNTER — HOSPITAL ENCOUNTER (EMERGENCY)
Facility: HOSPITAL | Age: 35
Discharge: HOME | End: 2020-05-02
Attending: EMERGENCY MEDICINE
Payer: MEDICAID

## 2020-05-02 VITALS
SYSTOLIC BLOOD PRESSURE: 129 MMHG | HEART RATE: 106 BPM | TEMPERATURE: 99 F | WEIGHT: 230 LBS | OXYGEN SATURATION: 100 % | BODY MASS INDEX: 36.1 KG/M2 | DIASTOLIC BLOOD PRESSURE: 79 MMHG | HEIGHT: 67 IN | RESPIRATION RATE: 17 BRPM

## 2020-05-02 DIAGNOSIS — F41.9 ANXIETY: ICD-10-CM

## 2020-05-02 DIAGNOSIS — R06.02 SHORTNESS OF BREATH: Primary | ICD-10-CM

## 2020-05-02 LAB
ALBUMIN SERPL BCP-MCNC: 4.2 G/DL (ref 3.5–5.2)
ALP SERPL-CCNC: 61 U/L (ref 55–135)
ALT SERPL W/O P-5'-P-CCNC: 35 U/L (ref 10–44)
ANION GAP SERPL CALC-SCNC: 16 MMOL/L (ref 8–16)
AST SERPL-CCNC: 28 U/L (ref 10–40)
BASOPHILS # BLD AUTO: 0.05 K/UL (ref 0–0.2)
BASOPHILS NFR BLD: 0.5 % (ref 0–1.9)
BILIRUB SERPL-MCNC: 0.2 MG/DL (ref 0.1–1)
BNP SERPL-MCNC: <10 PG/ML (ref 0–99)
BUN SERPL-MCNC: 11 MG/DL (ref 6–20)
CALCIUM SERPL-MCNC: 9.6 MG/DL (ref 8.7–10.5)
CHLORIDE SERPL-SCNC: 108 MMOL/L (ref 95–110)
CO2 SERPL-SCNC: 17 MMOL/L (ref 23–29)
CREAT SERPL-MCNC: 1.3 MG/DL (ref 0.5–1.4)
D DIMER PPP IA.FEU-MCNC: 0.42 MG/L FEU
DIFFERENTIAL METHOD: NORMAL
EOSINOPHIL # BLD AUTO: 0.2 K/UL (ref 0–0.5)
EOSINOPHIL NFR BLD: 2.3 % (ref 0–8)
ERYTHROCYTE [DISTWIDTH] IN BLOOD BY AUTOMATED COUNT: 12.9 % (ref 11.5–14.5)
EST. GFR  (AFRICAN AMERICAN): >60 ML/MIN/1.73 M^2
EST. GFR  (NON AFRICAN AMERICAN): 53 ML/MIN/1.73 M^2
GLUCOSE SERPL-MCNC: 187 MG/DL (ref 70–110)
HCT VFR BLD AUTO: 42.9 % (ref 37–48.5)
HGB BLD-MCNC: 14.4 G/DL (ref 12–16)
IMM GRANULOCYTES # BLD AUTO: 0.03 K/UL (ref 0–0.04)
IMM GRANULOCYTES NFR BLD AUTO: 0.3 % (ref 0–0.5)
INR PPP: 1 (ref 0.8–1.2)
LYMPHOCYTES # BLD AUTO: 4.5 K/UL (ref 1–4.8)
LYMPHOCYTES NFR BLD: 42.2 % (ref 18–48)
MCH RBC QN AUTO: 29.7 PG (ref 27–31)
MCHC RBC AUTO-ENTMCNC: 33.6 G/DL (ref 32–36)
MCV RBC AUTO: 89 FL (ref 82–98)
MONOCYTES # BLD AUTO: 1 K/UL (ref 0.3–1)
MONOCYTES NFR BLD: 8.9 % (ref 4–15)
NEUTROPHILS # BLD AUTO: 4.9 K/UL (ref 1.8–7.7)
NEUTROPHILS NFR BLD: 45.8 % (ref 38–73)
NRBC BLD-RTO: 0 /100 WBC
PLATELET # BLD AUTO: 296 K/UL (ref 150–350)
PMV BLD AUTO: 11.3 FL (ref 9.2–12.9)
POTASSIUM SERPL-SCNC: 3.8 MMOL/L (ref 3.5–5.1)
PROT SERPL-MCNC: 7.8 G/DL (ref 6–8.4)
PROTHROMBIN TIME: 11 SEC (ref 9–12.5)
RBC # BLD AUTO: 4.85 M/UL (ref 4–5.4)
SARS-COV-2 RDRP RESP QL NAA+PROBE: NEGATIVE
SODIUM SERPL-SCNC: 141 MMOL/L (ref 136–145)
TROPONIN I SERPL DL<=0.01 NG/ML-MCNC: <0.006 NG/ML (ref 0–0.03)
WBC # BLD AUTO: 10.65 K/UL (ref 3.9–12.7)

## 2020-05-02 PROCEDURE — 85025 COMPLETE CBC W/AUTO DIFF WBC: CPT

## 2020-05-02 PROCEDURE — 25000003 PHARM REV CODE 250: Performed by: EMERGENCY MEDICINE

## 2020-05-02 PROCEDURE — 84484 ASSAY OF TROPONIN QUANT: CPT

## 2020-05-02 PROCEDURE — 83880 ASSAY OF NATRIURETIC PEPTIDE: CPT

## 2020-05-02 PROCEDURE — 85379 FIBRIN DEGRADATION QUANT: CPT

## 2020-05-02 PROCEDURE — 85610 PROTHROMBIN TIME: CPT

## 2020-05-02 PROCEDURE — 93005 ELECTROCARDIOGRAM TRACING: CPT

## 2020-05-02 PROCEDURE — 93010 EKG 12-LEAD: ICD-10-PCS | Mod: ,,, | Performed by: INTERNAL MEDICINE

## 2020-05-02 PROCEDURE — 96374 THER/PROPH/DIAG INJ IV PUSH: CPT

## 2020-05-02 PROCEDURE — U0002 COVID-19 LAB TEST NON-CDC: HCPCS

## 2020-05-02 PROCEDURE — 99285 EMERGENCY DEPT VISIT HI MDM: CPT | Mod: 25

## 2020-05-02 PROCEDURE — 63600175 PHARM REV CODE 636 W HCPCS: Performed by: EMERGENCY MEDICINE

## 2020-05-02 PROCEDURE — 93010 ELECTROCARDIOGRAM REPORT: CPT | Mod: ,,, | Performed by: INTERNAL MEDICINE

## 2020-05-02 PROCEDURE — 80053 COMPREHEN METABOLIC PANEL: CPT

## 2020-05-02 PROCEDURE — 96361 HYDRATE IV INFUSION ADD-ON: CPT

## 2020-05-02 RX ORDER — LORAZEPAM 2 MG/ML
1 INJECTION INTRAMUSCULAR
Status: COMPLETED | OUTPATIENT
Start: 2020-05-02 | End: 2020-05-02

## 2020-05-02 RX ADMIN — SODIUM CHLORIDE 1000 ML: 0.9 INJECTION, SOLUTION INTRAVENOUS at 06:05

## 2020-05-02 RX ADMIN — LORAZEPAM 1 MG: 2 INJECTION INTRAMUSCULAR; INTRAVENOUS at 06:05

## 2020-05-02 NOTE — ED TRIAGE NOTES
"Pt. Arrive to ED from home c/o SOB and chest discomfort x 4 days. Pt. Came to ED yesterday for same symptoms and reports having no relief since, tested negative for COVID. Pt. describes chest discomfort as "pressure and squeezing". Pt. Also reports feeling lightheaded and "cold sweats", states "I'm freezing but I'm sweating". Pt. Also reports tingling in bilateral lower extremities. Pt. Denies vomiting, diarrhea, abd pain,   AAO x 4. Pt. Placed on cardiac monitor, BP cuff, and pulse ox.     "

## 2020-05-02 NOTE — ED PROVIDER NOTES
"Encounter Date: 2020    SCRIBE #1 NOTE: I, John Paul Reyes, am scribing for, and in the presence of,  Harshad Willoughby MD. I have scribed the following portions of the note - the EKG reading. Other sections scribed: HPI, ROS, PE.       History     Chief Complaint   Patient presents with    Shortness of Breath     "I was feeling fine when I woke up this morning, then all of a sudden I became short of breath and having a fast heart beat. My mom said I might be going through early menapause and I want to be checked out" pt denies chest pain, n/v/d     This 34 y.o F with a hx of Asthma, DM, Fibromyalgia, hx of pulmonary embolus during pregnancy, HTN and Mental disorder presents to the ED c/o acute onset of constant SOB, palpitations and light-headedness which began when she woke up at 0900h this AM. She also c/o intermittent diaphoresis. Her symptoms are improved at rest and worse with exertion. The pt was last seen in this ED yesterday with similar symptoms. She was tested for COVID-19 yesterday, which resulted negative. She attempted tx with an unspecified anxiety medication x1 hour PTA with no relief. She last smoked cigarettes yesterday. She last consumed beer at 1400h today. She last smoked marijuana x2 days ago. She denies any recent long distance travel. She also denies fever, dysuria, nausea, emesis, diarrhea, appetite change, chest pain, rashes, lesions and any other associated symptoms.         The history is provided by the patient.     Review of patient's allergies indicates:  No Known Allergies  Past Medical History:   Diagnosis Date    Asthma     as a child    Back pain, chronic 2013    " i have herinated disk"     Diabetes mellitus     Fibromyalgia     GERD (gastroesophageal reflux disease)     Herpes simplex without mention of complication     History of pulmonary embolus during pregnancy     after her second  section    Hypertension     off meds x 1 year    Mental disorder "     depression and migraine headaches     Past Surgical History:   Procedure Laterality Date     SECTION      DILATION AND CURETTAGE OF UTERUS      for elective abortions    ESOPHAGOGASTRODUODENOSCOPY  2019    ESOPHAGOGASTRODUODENOSCOPY  2019    ESOPHAGOGASTRODUODENOSCOPY N/A 2019    Procedure: EGD (ESOPHAGOGASTRODUODENOSCOPY);  Surgeon: Doni Marcos MD;  [facility] END[facility];  Service: Endoscopy;  Laterality: N/A;    HTA  2012    Dr Jiang    HYSTERECTOMY  2013    [facility]    LAPAROSCOPIC CHOLECYSTECTOMY N/A 2019    Procedure: CHOLECYSTECTOMY, LAPAROSCOPIC;  Surgeon: Henry Dillard MD;  Location: [facility] OR;  Service: General;  Laterality: N/A;  RN PRE OP 19    marsupialization of Bartholin's cyst in  and 2005 and     TUBAL LIGATION      uterine ablation  12     Family History   Problem Relation Age of Onset    Diabetes Mother     Hypertension Mother     Stroke Mother      Social History     Tobacco Use    Smoking status: Former Smoker     Last attempt to quit: 2015     Years since quittin.3    Smokeless tobacco: Never Used    Tobacco comment: One- 2  cigarettes  / day   Substance Use Topics    Alcohol use: Yes     Alcohol/week: 1.0 standard drinks     Types: 1 Shots of liquor per week     Comment: rarely    Drug use: Yes     Types: Marijuana     Review of Systems   Constitutional: Positive for diaphoresis. Negative for appetite change, chills and fever.   HENT: Negative for congestion and sore throat.    Eyes: Negative for visual disturbance.   Respiratory: Positive for shortness of breath. Negative for cough.    Cardiovascular: Positive for palpitations. Negative for chest pain.   Gastrointestinal: Negative for abdominal pain, diarrhea, nausea and vomiting.   Genitourinary: Negative for dysuria and vaginal discharge.   Skin: Negative for rash.   Neurological: Positive for light-headedness. Negative for headaches.    Psychiatric/Behavioral: Negative for decreased concentration.       Physical Exam     Initial Vitals [05/02/20 1809]   BP Pulse Resp Temp SpO2   130/69 (!) 138 (!) 23 98.4 °F (36.9 °C) 97 %      MAP       --         Physical Exam    Nursing note and vitals reviewed.  Constitutional: She appears well-developed and well-nourished.  Non-toxic appearance. No distress.   HENT:   Head: Normocephalic and atraumatic.   Mouth/Throat: Oropharynx is clear and moist and mucous membranes are normal.   Eyes: Conjunctivae and EOM are normal. Pupils are equal, round, and reactive to light. Right conjunctiva is not injected. Left conjunctiva is not injected. No scleral icterus.   Neck: Normal range of motion and full passive range of motion without pain. Neck supple.   Cardiovascular: Regular rhythm, S1 normal, S2 normal, normal heart sounds and normal pulses. Tachycardia present.  Exam reveals no gallop and no friction rub.    No murmur heard.  Pulses:       Radial pulses are 2+ on the right side, and 2+ on the left side.   Pulmonary/Chest: Effort normal and breath sounds normal.   Respiratory rate varies between 16-30 depending on anxiety.     Abdominal: Soft. She exhibits no distension. There is no tenderness.   Musculoskeletal: Normal range of motion. She exhibits no edema.   Good active ROM of all extremities. No lower extremity edema or cyanosis.    Neurological: No cranial nerve deficit. Gait normal.   A&Ox4, normal speech.   Skin: Skin is warm. Capillary refill takes 2 to 3 seconds. No ecchymosis and no rash noted.   Psychiatric: Thought content normal. Her mood appears anxious.         ED Course   Procedures  Labs Reviewed   COMPREHENSIVE METABOLIC PANEL - Abnormal; Notable for the following components:       Result Value    CO2 17 (*)     Glucose 187 (*)     eGFR if non  53 (*)     All other components within normal limits   CBC W/ AUTO DIFFERENTIAL   TROPONIN I   B-TYPE NATRIURETIC PEPTIDE   PROTIME-INR    D DIMER, QUANTITATIVE   SARS-COV-2 RNA AMPLIFICATION, QUAL    Narrative:     What symptom criteria does the patient meet?->Shortness of  breath or difficulty breathing     EKG Readings: (Independently Interpreted)   Initial Reading: No STEMI. Rhythm: Sinus Bradycardia. Heart Rate: 134 bpm. Ectopy: No Ectopy. Conduction: Normal. ST Segments: Normal ST Segments. T Waves: Normal. Clinical Impression: Normal Sinus Rhythm   EKG done at 18:08       Imaging Results          X-Ray Chest AP Portable (Final result)  Result time 05/02/20 19:52:05    Final result by Seun Lorenz MD (05/02/20 19:52:05)                 Impression:      1. Minimally prominent central hilar interstitial attenuation, likely accentuated by shallow inspiratory effort.  Early congestive change is a consideration although correlation is needed given the subtlety.      Electronically signed by: Seun Lorenz MD  Date:    05/02/2020  Time:    19:52             Narrative:    EXAMINATION:  XR CHEST AP PORTABLE    CLINICAL HISTORY:  CHF;    TECHNIQUE:  Single frontal view of the chest was performed.    COMPARISON:  05/01/2020    FINDINGS:  The cardiomediastinal silhouette is not enlarged noting magnification by technique..  There is no pleural effusion.  The trachea is midline.  The lungs are symmetrically expanded bilaterally with minimally prominent central hilar interstitial attenuation.  No large focal consolidation seen.  There is no pneumothorax.  The osseous structures are unremarkable.                                 Medical Decision Making:   Initial Assessment:   34-year-old female presenting secondary shortness of breath.  Most consistent with anxiety.  Seen yesterday with a normal workup.  Will add on cardiac enzymes and D-dimer due to tachycardia.  Patient's pulse is variable depending on anxiety level along with her respiratory rate.  Giving IV fluids.    Also considered but less likely:     PE: normal rate, d dimer negative.    Pneumonia: chest xray negative. No fever. No cough and lungs non consistent with pna  Tamponade: unlikely due to chest xray and ekg  STEMI: No STEMI on ekg  Dissection: equal pulses bilaterally and no ripping chest pain to the back  Esophageal rupture: no dysphagia or vomiting and chest xray negative for mediastinal air  Arrhythmia: no arrhythmia on ekg  CHF: no fluid overload on Cxr and physical exam  Pneumothorax: bilateral breath sounds and no signs of pneumothorax on chest xray     Tachycardia improved markedly with IV Ativan.  Given fluids also.  Most responsive to the Ativan.  Still think this is consistent with anxiety.  To follow up outpatient. I discussed with the patient the diagnosis, treatment plan, indications for return to the emergency department, and for expected follow-up. The patient verbalized an understanding. The patient is asked if there are any questions or concerns. We discuss the case, until all issues are addressed to the patients satisfaction. Patient understands and is agreeable to the plan.   Harshad Willoughby    Clinical Tests:   Lab Tests: Ordered and Reviewed  Radiological Study: Reviewed and Ordered  Medical Tests: Ordered and Reviewed            Scribe Attestation:   Scribe #1: I performed the above scribed service and the documentation accurately describes the services I performed. I attest to the accuracy of the note.                          Clinical Impression:       ICD-10-CM ICD-9-CM   1. Shortness of breath R06.02 786.05   2. Anxiety F41.9 300.00             ED Disposition Condition    Discharge Stable        ED Prescriptions     None        Follow-up Information     Follow up With Specialties Details Why Contact Info    Vinod Tiwari III, MD Internal Medicine Schedule an appointment as soon as possible for a visit in 2 days  [address] [address]  [facility] [address]  [phone]                            IHarshad, personally performed the  services described in this documentation. All medical record entries made by the scribe were at my direction and in my presence. I have reviewed the chart and agree that the record reflects my personal performance and is accurate and complete.             Harshad Willoughby MD  05/02/20 2032

## 2020-05-03 NOTE — ED NOTES
Pt ambulated to restroom with strong steady gait, no assist needed; pt then ambulated back into room and into bed

## 2020-05-05 ENCOUNTER — TELEPHONE (OUTPATIENT)
Dept: OBSTETRICS AND GYNECOLOGY | Facility: CLINIC | Age: 35
End: 2020-05-05

## 2020-05-05 NOTE — TELEPHONE ENCOUNTER
----- Message from Mei Pabon sent at 5/5/2020  9:43 AM CDT -----  Contact: Viviana [phone]  Name of Caller: Viviana     Reason for Visit/Symptoms: patient stated that she does not feel good and thinks it may be pre-menopausal symptoms . The symptoms are really bad anxiety attacks, cold sweats, clammy skin, mood swings, and heart racing.    Best Contact Number or Confirm if Mychart Preferred: [phone]    Preferred Date/Time of Appointment: as soon as possible     Interested in Virtual Visit (yes/no): no    Additional Information: The patient stated that she has gotten something for the anxiety attacks from her pcp but still has all the other symptoms. She would like an in office appt if possible.        Pt has appt with Dr Jiang for virtual on 5/7/2020 to discuss her symptoms

## 2020-05-07 ENCOUNTER — OFFICE VISIT (OUTPATIENT)
Dept: OBSTETRICS AND GYNECOLOGY | Facility: CLINIC | Age: 35
End: 2020-05-07
Payer: MEDICAID

## 2020-05-07 DIAGNOSIS — F41.9 ANXIETY: Primary | ICD-10-CM

## 2020-05-07 DIAGNOSIS — R23.2 HOT FLASHES: ICD-10-CM

## 2020-05-07 DIAGNOSIS — F32.A DEPRESSION, UNSPECIFIED DEPRESSION TYPE: ICD-10-CM

## 2020-05-07 PROCEDURE — 99213 OFFICE O/P EST LOW 20 MIN: CPT | Mod: 95,,, | Performed by: OBSTETRICS & GYNECOLOGY

## 2020-05-07 PROCEDURE — 99213 PR OFFICE/OUTPT VISIT, EST, LEVL III, 20-29 MIN: ICD-10-PCS | Mod: 95,,, | Performed by: OBSTETRICS & GYNECOLOGY

## 2020-05-07 RX ORDER — DIAZEPAM 2 MG/1
2 TABLET ORAL EVERY 8 HOURS PRN
Qty: 30 TABLET | Refills: 0 | Status: SHIPPED | OUTPATIENT
Start: 2020-05-07 | End: 2021-01-19

## 2020-05-07 NOTE — PROGRESS NOTES
"The patient location is: her residence  The chief complaint leading to consultation is: Anxiety and Depression  Visit type: audiovisual  Total time spent with patient: 15 min  Each patient to whom he or she provides medical services by telemedicine is:  (1) informed of the relationship between the physician and patient and the respective role of any other health care provider with respect to management of the patient; and (2) notified that he or she may decline to receive medical services by telemedicine and may withdraw from such care at any time.    Notes:   Patient presents today again with anxiety and depression  Went to our Emergency Department on 2020 and 2020 with chest pain, shortness of breath, irritation, palpitation, mood swing, sweating...  Was diagnosed with anxiety.  Prescribed Ativan.  Feeling a little better. But still there.  "I know my body.  This is not me.  I talked to my mom.  She thinks this is menopause."    Status post supracervical abdominal hysterectomy in 2013  Status post pulmonary embolus after her last  section.    CXR/EKG in the ED were both normal.    Seen last 2019 for the same problems.  Placed on Ativan and Wellbutrin.    Still taking Wellbutrin.  Does not think she is depressed now.  Needs more Ativan.    Discussed with patient her condition  She needs mental health.  It has been difficult for us to find psychiatric help for our Medicaid patients.    LH, FSH, estradiol, TSH, and prolactin  Continue with Wellbutrin  Valium as needed.  She knows not to take it frequently.    "

## 2020-06-29 ENCOUNTER — TELEPHONE (OUTPATIENT)
Dept: OBSTETRICS AND GYNECOLOGY | Facility: CLINIC | Age: 35
End: 2020-06-29

## 2020-06-29 NOTE — TELEPHONE ENCOUNTER
Attempted to return pt.'s call no answer left message for call back       ----- Message from Opal Hedrick sent at 6/29/2020  2:35 PM CDT -----  Regarding: yeast infection  Type: Patient Call Back    Who called:pt    What is the request in detail:calling for something for yeast infection. . Call pt    Can the clinic reply by [facility]?    Would the patient rather a call back or a response via [facility]? call    Best call back number:[phone]      Additional Information:

## 2020-06-30 ENCOUNTER — TELEPHONE (OUTPATIENT)
Dept: OBSTETRICS AND GYNECOLOGY | Facility: CLINIC | Age: 35
End: 2020-06-30

## 2020-06-30 DIAGNOSIS — B37.31 CANDIDA VAGINITIS: Primary | ICD-10-CM

## 2020-06-30 RX ORDER — FLUCONAZOLE 150 MG/1
150 TABLET ORAL DAILY
Qty: 1 TABLET | Refills: 0 | Status: SHIPPED | OUTPATIENT
Start: 2020-06-30 | End: 2020-07-01

## 2020-06-30 NOTE — TELEPHONE ENCOUNTER
Returned patient's call, she would like a script for Diflucan for yeast. She states she took antibiotics for her toothe recently.

## 2020-06-30 NOTE — TELEPHONE ENCOUNTER
----- Message from Azul Koch sent at 6/30/2020 11:24 AM CDT -----  Contact: Viviana  Type: Patient Call Back    Who called:Viviana    What is the request in detail:Patient returned call to clinic about her medication. Please call to advise    Can the clinic reply by [facility]?    Would the patient rather a call back or a response via [facility]? Call    Best call back number:[phone]

## 2020-10-12 ENCOUNTER — TELEPHONE (OUTPATIENT)
Dept: OBSTETRICS AND GYNECOLOGY | Facility: CLINIC | Age: 35
End: 2020-10-12

## 2020-10-12 DIAGNOSIS — B37.31 CANDIDA VAGINITIS: Primary | ICD-10-CM

## 2020-10-12 RX ORDER — FLUCONAZOLE 50 MG/1
TABLET ORAL
Status: CANCELLED | OUTPATIENT
Start: 2020-10-12 | End: 2020-10-12

## 2020-10-12 RX ORDER — FLUCONAZOLE 150 MG/1
150 TABLET ORAL DAILY
Qty: 1 TABLET | Refills: 0 | Status: SHIPPED | OUTPATIENT
Start: 2020-10-12 | End: 2020-10-13

## 2020-10-12 NOTE — TELEPHONE ENCOUNTER
----- Message from Kym Newman sent at 10/12/2020  2:57 PM CDT -----  Contact: Patient [phone]  Type: RX Refill Request    Who Called: Patient    Have you contacted your pharmacy:No    Refill or New Rx:Refill    RX Name and Strength: fluconazole (DIFLUCAN) 150 MG Tab    Is this a 30 day or 90 day RX: 90 day    Preferred Pharmacy with phone number: .  [id number] - [facility] [address] [place] [address] [facility] [address]  Phone: [phone] Fax: [phone]    Local or Mail Order: Local    Would the patient rather a call back or a response via [facility]?  Call back    Best Call Back Number: [phone]

## 2021-01-19 ENCOUNTER — OFFICE VISIT (OUTPATIENT)
Dept: OBSTETRICS AND GYNECOLOGY | Facility: CLINIC | Age: 36
End: 2021-01-19
Payer: MEDICAID

## 2021-01-19 VITALS
HEIGHT: 67 IN | WEIGHT: 203.94 LBS | DIASTOLIC BLOOD PRESSURE: 82 MMHG | SYSTOLIC BLOOD PRESSURE: 130 MMHG | BODY MASS INDEX: 32.01 KG/M2

## 2021-01-19 DIAGNOSIS — Z01.419 WELL WOMAN EXAM WITH ROUTINE GYNECOLOGICAL EXAM: Primary | ICD-10-CM

## 2021-01-19 PROCEDURE — 99395 PREV VISIT EST AGE 18-39: CPT | Mod: S$PBB,,, | Performed by: OBSTETRICS & GYNECOLOGY

## 2021-01-19 PROCEDURE — 99999 PR PBB SHADOW E&M-EST. PATIENT-LVL III: CPT | Mod: PBBFAC,,, | Performed by: OBSTETRICS & GYNECOLOGY

## 2021-01-19 PROCEDURE — 87624 HPV HI-RISK TYP POOLED RSLT: CPT

## 2021-01-19 PROCEDURE — 88141 CYTOPATH C/V INTERPRET: CPT | Mod: ,,, | Performed by: PATHOLOGY

## 2021-01-19 PROCEDURE — 99999 PR PBB SHADOW E&M-EST. PATIENT-LVL III: ICD-10-PCS | Mod: PBBFAC,,, | Performed by: OBSTETRICS & GYNECOLOGY

## 2021-01-19 PROCEDURE — 99395 PR PREVENTIVE VISIT,EST,18-39: ICD-10-PCS | Mod: S$PBB,,, | Performed by: OBSTETRICS & GYNECOLOGY

## 2021-01-19 PROCEDURE — 88141 PR  CYTOPATH CERV/VAG INTERPRET: ICD-10-PCS | Mod: ,,, | Performed by: PATHOLOGY

## 2021-01-19 PROCEDURE — 99213 OFFICE O/P EST LOW 20 MIN: CPT | Mod: PBBFAC | Performed by: OBSTETRICS & GYNECOLOGY

## 2021-01-19 PROCEDURE — 88142 CYTOPATH C/V THIN LAYER: CPT | Performed by: PATHOLOGY

## 2021-01-19 RX ORDER — SERTRALINE HYDROCHLORIDE 50 MG/1
50 TABLET, FILM COATED ORAL DAILY
COMMUNITY
Start: 2021-01-08

## 2021-01-19 RX ORDER — PEN NEEDLE, DIABETIC 31 GX5/16"
NEEDLE, DISPOSABLE MISCELLANEOUS
COMMUNITY
Start: 2020-12-04

## 2021-01-19 RX ORDER — QUETIAPINE FUMARATE 100 MG/1
TABLET, FILM COATED ORAL
COMMUNITY
Start: 2020-12-08 | End: 2023-03-27

## 2021-01-27 LAB
HPV HR 12 DNA SPEC QL NAA+PROBE: NEGATIVE
HPV16 AG SPEC QL: NEGATIVE
HPV18 DNA SPEC QL NAA+PROBE: NEGATIVE

## 2021-02-12 ENCOUNTER — HOSPITAL ENCOUNTER (EMERGENCY)
Facility: HOSPITAL | Age: 36
Discharge: HOME | End: 2021-02-12
Attending: EMERGENCY MEDICINE
Payer: MEDICAID

## 2021-02-12 VITALS
RESPIRATION RATE: 18 BRPM | DIASTOLIC BLOOD PRESSURE: 86 MMHG | OXYGEN SATURATION: 100 % | SYSTOLIC BLOOD PRESSURE: 134 MMHG | HEIGHT: 67 IN | HEART RATE: 65 BPM | TEMPERATURE: 99 F | BODY MASS INDEX: 31.08 KG/M2 | WEIGHT: 198 LBS

## 2021-02-12 DIAGNOSIS — K21.9 GASTROESOPHAGEAL REFLUX DISEASE, UNSPECIFIED WHETHER ESOPHAGITIS PRESENT: ICD-10-CM

## 2021-02-12 DIAGNOSIS — R07.9 CHEST PAIN: ICD-10-CM

## 2021-02-12 DIAGNOSIS — R74.01 TRANSAMINITIS: Primary | ICD-10-CM

## 2021-02-12 LAB
ALBUMIN SERPL BCP-MCNC: 3.7 G/DL (ref 3.5–5.2)
ALP SERPL-CCNC: 88 U/L (ref 55–135)
ALT SERPL W/O P-5'-P-CCNC: 139 U/L (ref 10–44)
AMPHET+METHAMPHET UR QL: NEGATIVE
ANION GAP SERPL CALC-SCNC: 9 MMOL/L (ref 8–16)
AST SERPL-CCNC: 234 U/L (ref 10–40)
BARBITURATES UR QL SCN>200 NG/ML: NEGATIVE
BASOPHILS # BLD AUTO: 0.02 K/UL (ref 0–0.2)
BASOPHILS NFR BLD: 0.3 % (ref 0–1.9)
BENZODIAZ UR QL SCN>200 NG/ML: NEGATIVE
BILIRUB SERPL-MCNC: 0.5 MG/DL (ref 0.1–1)
BILIRUB UR QL STRIP: NEGATIVE
BUN SERPL-MCNC: 13 MG/DL (ref 6–20)
BZE UR QL SCN: NEGATIVE
CALCIUM SERPL-MCNC: 8.7 MG/DL (ref 8.7–10.5)
CANNABINOIDS UR QL SCN: NORMAL
CHLORIDE SERPL-SCNC: 108 MMOL/L (ref 95–110)
CK SERPL-CCNC: 78 U/L (ref 20–180)
CLARITY UR: CLEAR
CO2 SERPL-SCNC: 25 MMOL/L (ref 23–29)
COLOR UR: YELLOW
CREAT SERPL-MCNC: 0.7 MG/DL (ref 0.5–1.4)
CREAT UR-MCNC: 221.3 MG/DL (ref 15–325)
CTP QC/QA: YES
DIFFERENTIAL METHOD: ABNORMAL
EOSINOPHIL # BLD AUTO: 0.2 K/UL (ref 0–0.5)
EOSINOPHIL NFR BLD: 3.1 % (ref 0–8)
ERYTHROCYTE [DISTWIDTH] IN BLOOD BY AUTOMATED COUNT: 12.9 % (ref 11.5–14.5)
EST. GFR  (AFRICAN AMERICAN): >60 ML/MIN/1.73 M^2
EST. GFR  (NON AFRICAN AMERICAN): >60 ML/MIN/1.73 M^2
GLUCOSE SERPL-MCNC: 86 MG/DL (ref 70–110)
GLUCOSE UR QL STRIP: NEGATIVE
HCT VFR BLD AUTO: 38.4 % (ref 37–48.5)
HGB BLD-MCNC: 12.7 G/DL (ref 12–16)
HGB UR QL STRIP: NEGATIVE
IMM GRANULOCYTES # BLD AUTO: 0.02 K/UL (ref 0–0.04)
IMM GRANULOCYTES NFR BLD AUTO: 0.3 % (ref 0–0.5)
KETONES UR QL STRIP: NEGATIVE
LEUKOCYTE ESTERASE UR QL STRIP: NEGATIVE
LIPASE SERPL-CCNC: 31 U/L (ref 4–60)
LYMPHOCYTES # BLD AUTO: 0.9 K/UL (ref 1–4.8)
LYMPHOCYTES NFR BLD: 14.9 % (ref 18–48)
MCH RBC QN AUTO: 29 PG (ref 27–31)
MCHC RBC AUTO-ENTMCNC: 33.1 G/DL (ref 32–36)
MCV RBC AUTO: 88 FL (ref 82–98)
METHADONE UR QL SCN>300 NG/ML: NEGATIVE
MONOCYTES # BLD AUTO: 0.2 K/UL (ref 0.3–1)
MONOCYTES NFR BLD: 3.1 % (ref 4–15)
NEUTROPHILS # BLD AUTO: 4.5 K/UL (ref 1.8–7.7)
NEUTROPHILS NFR BLD: 78.3 % (ref 38–73)
NITRITE UR QL STRIP: NEGATIVE
NRBC BLD-RTO: 0 /100 WBC
OPIATES UR QL SCN: NEGATIVE
PCP UR QL SCN>25 NG/ML: NEGATIVE
PH UR STRIP: 6 [PH] (ref 5–8)
PLATELET # BLD AUTO: 192 K/UL (ref 150–350)
PMV BLD AUTO: 12.5 FL (ref 9.2–12.9)
POTASSIUM SERPL-SCNC: 4.2 MMOL/L (ref 3.5–5.1)
PROT SERPL-MCNC: 6.4 G/DL (ref 6–8.4)
PROT UR QL STRIP: ABNORMAL
RBC # BLD AUTO: 4.38 M/UL (ref 4–5.4)
SARS-COV-2 RDRP RESP QL NAA+PROBE: NEGATIVE
SODIUM SERPL-SCNC: 142 MMOL/L (ref 136–145)
SP GR UR STRIP: 1.03 (ref 1–1.03)
TOXICOLOGY INFORMATION: NORMAL
TROPONIN I SERPL DL<=0.01 NG/ML-MCNC: 0.01 NG/ML (ref 0–0.03)
TROPONIN I SERPL DL<=0.01 NG/ML-MCNC: <0.006 NG/ML (ref 0–0.03)
URN SPEC COLLECT METH UR: ABNORMAL
UROBILINOGEN UR STRIP-ACNC: NEGATIVE EU/DL
WBC # BLD AUTO: 5.78 K/UL (ref 3.9–12.7)

## 2021-02-12 PROCEDURE — 80307 DRUG TEST PRSMV CHEM ANLYZR: CPT

## 2021-02-12 PROCEDURE — 93010 EKG 12-LEAD: ICD-10-PCS | Mod: ,,, | Performed by: INTERNAL MEDICINE

## 2021-02-12 PROCEDURE — U0003 INFECTIOUS AGENT DETECTION BY NUCLEIC ACID (DNA OR RNA); SEVERE ACUTE RESPIRATORY SYNDROME CORONAVIRUS 2 (SARS-COV-2) (CORONAVIRUS DISEASE [COVID-19]), AMPLIFIED PROBE TECHNIQUE, MAKING USE OF HIGH THROUGHPUT TECHNOLOGIES AS DESCRIBED BY CMS-2020-01-R: HCPCS

## 2021-02-12 PROCEDURE — 25000003 PHARM REV CODE 250: Performed by: PHYSICIAN ASSISTANT

## 2021-02-12 PROCEDURE — 99285 EMERGENCY DEPT VISIT HI MDM: CPT | Mod: 25

## 2021-02-12 PROCEDURE — 80053 COMPREHEN METABOLIC PANEL: CPT

## 2021-02-12 PROCEDURE — 82550 ASSAY OF CK (CPK): CPT

## 2021-02-12 PROCEDURE — 85025 COMPLETE CBC W/AUTO DIFF WBC: CPT

## 2021-02-12 PROCEDURE — U0005 INFEC AGEN DETEC AMPLI PROBE: HCPCS

## 2021-02-12 PROCEDURE — U0002 COVID-19 LAB TEST NON-CDC: HCPCS | Performed by: PHYSICIAN ASSISTANT

## 2021-02-12 PROCEDURE — 96376 TX/PRO/DX INJ SAME DRUG ADON: CPT | Mod: 59

## 2021-02-12 PROCEDURE — 93005 ELECTROCARDIOGRAM TRACING: CPT

## 2021-02-12 PROCEDURE — 93010 ELECTROCARDIOGRAM REPORT: CPT | Mod: ,,, | Performed by: INTERNAL MEDICINE

## 2021-02-12 PROCEDURE — 83690 ASSAY OF LIPASE: CPT

## 2021-02-12 PROCEDURE — 96375 TX/PRO/DX INJ NEW DRUG ADDON: CPT

## 2021-02-12 PROCEDURE — 81003 URINALYSIS AUTO W/O SCOPE: CPT | Mod: 59

## 2021-02-12 PROCEDURE — 84484 ASSAY OF TROPONIN QUANT: CPT

## 2021-02-12 PROCEDURE — 25500020 PHARM REV CODE 255: Performed by: EMERGENCY MEDICINE

## 2021-02-12 PROCEDURE — 96361 HYDRATE IV INFUSION ADD-ON: CPT

## 2021-02-12 PROCEDURE — 96365 THER/PROPH/DIAG IV INF INIT: CPT

## 2021-02-12 PROCEDURE — 63600175 PHARM REV CODE 636 W HCPCS: Performed by: PHYSICIAN ASSISTANT

## 2021-02-12 RX ORDER — CYCLOBENZAPRINE HCL 10 MG
10 TABLET ORAL 3 TIMES DAILY PRN
Qty: 20 TABLET | Refills: 0 | Status: SHIPPED | OUTPATIENT
Start: 2021-02-12 | End: 2021-02-19

## 2021-02-12 RX ORDER — ONDANSETRON 4 MG/1
4 TABLET, ORALLY DISINTEGRATING ORAL EVERY 6 HOURS PRN
Qty: 15 TABLET | Refills: 0 | Status: SHIPPED | OUTPATIENT
Start: 2021-02-12 | End: 2021-02-17

## 2021-02-12 RX ORDER — DICYCLOMINE HYDROCHLORIDE 10 MG/1
20 CAPSULE ORAL
Status: COMPLETED | OUTPATIENT
Start: 2021-02-12 | End: 2021-02-12

## 2021-02-12 RX ORDER — ONDANSETRON 2 MG/ML
4 INJECTION INTRAMUSCULAR; INTRAVENOUS
Status: COMPLETED | OUTPATIENT
Start: 2021-02-12 | End: 2021-02-12

## 2021-02-12 RX ORDER — LIDOCAINE HYDROCHLORIDE 20 MG/ML
10 SOLUTION OROPHARYNGEAL
Status: COMPLETED | OUTPATIENT
Start: 2021-02-12 | End: 2021-02-12

## 2021-02-12 RX ORDER — IBUPROFEN 600 MG/1
600 TABLET ORAL EVERY 6 HOURS PRN
Qty: 20 TABLET | Refills: 0 | Status: SHIPPED | OUTPATIENT
Start: 2021-02-12 | End: 2021-02-17

## 2021-02-12 RX ORDER — MORPHINE SULFATE 4 MG/ML
4 INJECTION, SOLUTION INTRAMUSCULAR; INTRAVENOUS
Status: COMPLETED | OUTPATIENT
Start: 2021-02-12 | End: 2021-02-12

## 2021-02-12 RX ORDER — MAG HYDROX/ALUMINUM HYD/SIMETH 200-200-20
30 SUSPENSION, ORAL (FINAL DOSE FORM) ORAL
Status: COMPLETED | OUTPATIENT
Start: 2021-02-12 | End: 2021-02-12

## 2021-02-12 RX ORDER — MAG HYDROX/ALUMINUM HYD/SIMETH 200-200-20
30 SUSPENSION, ORAL (FINAL DOSE FORM) ORAL
Qty: 354 ML | Refills: 0 | Status: SHIPPED | OUTPATIENT
Start: 2021-02-12 | End: 2021-10-15

## 2021-02-12 RX ORDER — ALBUTEROL SULFATE 90 UG/1
1-2 AEROSOL, METERED RESPIRATORY (INHALATION) EVERY 6 HOURS PRN
Qty: 18 G | Refills: 0 | Status: SHIPPED | OUTPATIENT
Start: 2021-02-12 | End: 2021-04-10 | Stop reason: SDUPTHER

## 2021-02-12 RX ORDER — LORAZEPAM 2 MG/ML
0.5 INJECTION INTRAMUSCULAR
Status: COMPLETED | OUTPATIENT
Start: 2021-02-12 | End: 2021-02-12

## 2021-02-12 RX ORDER — KETOROLAC TROMETHAMINE 30 MG/ML
15 INJECTION, SOLUTION INTRAMUSCULAR; INTRAVENOUS
Status: COMPLETED | OUTPATIENT
Start: 2021-02-12 | End: 2021-02-12

## 2021-02-12 RX ORDER — PANTOPRAZOLE SODIUM 20 MG/1
20 TABLET, DELAYED RELEASE ORAL DAILY
Qty: 30 TABLET | Refills: 0 | Status: SHIPPED | OUTPATIENT
Start: 2021-02-12 | End: 2021-10-15

## 2021-02-12 RX ORDER — FAMOTIDINE 10 MG/ML
20 INJECTION INTRAVENOUS
Status: COMPLETED | OUTPATIENT
Start: 2021-02-12 | End: 2021-02-12

## 2021-02-12 RX ADMIN — LIDOCAINE HYDROCHLORIDE 10 ML: 20 SOLUTION ORAL; TOPICAL at 07:02

## 2021-02-12 RX ADMIN — ONDANSETRON 4 MG: 2 INJECTION INTRAMUSCULAR; INTRAVENOUS at 06:02

## 2021-02-12 RX ADMIN — SODIUM CHLORIDE 1000 ML: 0.9 INJECTION, SOLUTION INTRAVENOUS at 03:02

## 2021-02-12 RX ADMIN — MORPHINE SULFATE 4 MG: 4 INJECTION INTRAVENOUS at 03:02

## 2021-02-12 RX ADMIN — FAMOTIDINE 20 MG: 10 INJECTION INTRAVENOUS at 05:02

## 2021-02-12 RX ADMIN — PROMETHAZINE HYDROCHLORIDE 12.5 MG: 25 INJECTION INTRAMUSCULAR; INTRAVENOUS at 06:02

## 2021-02-12 RX ADMIN — DICYCLOMINE HYDROCHLORIDE 20 MG: 10 CAPSULE ORAL at 07:02

## 2021-02-12 RX ADMIN — LORAZEPAM 0.5 MG: 2 INJECTION INTRAMUSCULAR; INTRAVENOUS at 07:02

## 2021-02-12 RX ADMIN — ONDANSETRON 4 MG: 2 INJECTION INTRAMUSCULAR; INTRAVENOUS at 03:02

## 2021-02-12 RX ADMIN — KETOROLAC TROMETHAMINE 15 MG: 30 INJECTION, SOLUTION INTRAMUSCULAR; INTRAVENOUS at 05:02

## 2021-02-12 RX ADMIN — SODIUM CHLORIDE 1000 ML: 0.9 INJECTION, SOLUTION INTRAVENOUS at 07:02

## 2021-02-12 RX ADMIN — ALUMINUM HYDROXIDE, MAGNESIUM HYDROXIDE, AND SIMETHICONE 30 ML: 200; 200; 20 SUSPENSION ORAL at 07:02

## 2021-02-12 RX ADMIN — IOHEXOL 75 ML: 350 INJECTION, SOLUTION INTRAVENOUS at 04:02

## 2021-02-14 LAB — SARS-COV-2 RNA RESP QL NAA+PROBE: NOT DETECTED

## 2021-02-15 LAB
FINAL PATHOLOGIC DIAGNOSIS: ABNORMAL
Lab: ABNORMAL

## 2021-03-30 ENCOUNTER — HOSPITAL ENCOUNTER (EMERGENCY)
Facility: HOSPITAL | Age: 36
Discharge: HOME | End: 2021-03-30
Attending: EMERGENCY MEDICINE
Payer: MEDICAID

## 2021-03-30 VITALS
HEART RATE: 90 BPM | OXYGEN SATURATION: 98 % | RESPIRATION RATE: 18 BRPM | WEIGHT: 198 LBS | TEMPERATURE: 99 F | HEIGHT: 67 IN | SYSTOLIC BLOOD PRESSURE: 127 MMHG | BODY MASS INDEX: 31.08 KG/M2 | DIASTOLIC BLOOD PRESSURE: 67 MMHG

## 2021-03-30 DIAGNOSIS — R42 LIGHTHEADEDNESS: Primary | ICD-10-CM

## 2021-03-30 DIAGNOSIS — R53.83 FATIGUE, UNSPECIFIED TYPE: ICD-10-CM

## 2021-03-30 LAB
ALBUMIN SERPL BCP-MCNC: 3.8 G/DL (ref 3.5–5.2)
ALP SERPL-CCNC: 66 U/L (ref 55–135)
ALT SERPL W/O P-5'-P-CCNC: 26 U/L (ref 10–44)
ANION GAP SERPL CALC-SCNC: 10 MMOL/L (ref 8–16)
AST SERPL-CCNC: 20 U/L (ref 10–40)
BILIRUB SERPL-MCNC: 0.4 MG/DL (ref 0.1–1)
BUN SERPL-MCNC: 16 MG/DL (ref 6–20)
CALCIUM SERPL-MCNC: 9.2 MG/DL (ref 8.7–10.5)
CHLORIDE SERPL-SCNC: 109 MMOL/L (ref 95–110)
CO2 SERPL-SCNC: 20 MMOL/L (ref 23–29)
CREAT SERPL-MCNC: 0.9 MG/DL (ref 0.5–1.4)
EST. GFR  (AFRICAN AMERICAN): >60 ML/MIN/1.73 M^2
EST. GFR  (NON AFRICAN AMERICAN): >60 ML/MIN/1.73 M^2
GLUCOSE SERPL-MCNC: 128 MG/DL (ref 70–110)
MAGNESIUM SERPL-MCNC: 1.7 MG/DL (ref 1.6–2.6)
POTASSIUM SERPL-SCNC: 3.5 MMOL/L (ref 3.5–5.1)
PROT SERPL-MCNC: 6.8 G/DL (ref 6–8.4)
SODIUM SERPL-SCNC: 139 MMOL/L (ref 136–145)
TROPONIN I SERPL DL<=0.01 NG/ML-MCNC: <0.006 NG/ML (ref 0–0.03)

## 2021-03-30 PROCEDURE — 93010 ELECTROCARDIOGRAM REPORT: CPT | Mod: ,,, | Performed by: INTERNAL MEDICINE

## 2021-03-30 PROCEDURE — 99284 EMERGENCY DEPT VISIT MOD MDM: CPT | Mod: 25

## 2021-03-30 PROCEDURE — 84484 ASSAY OF TROPONIN QUANT: CPT | Performed by: EMERGENCY MEDICINE

## 2021-03-30 PROCEDURE — 83735 ASSAY OF MAGNESIUM: CPT | Performed by: EMERGENCY MEDICINE

## 2021-03-30 PROCEDURE — 96360 HYDRATION IV INFUSION INIT: CPT

## 2021-03-30 PROCEDURE — 93010 EKG 12-LEAD: ICD-10-PCS | Mod: ,,, | Performed by: INTERNAL MEDICINE

## 2021-03-30 PROCEDURE — 93005 ELECTROCARDIOGRAM TRACING: CPT

## 2021-03-30 PROCEDURE — 25000003 PHARM REV CODE 250: Performed by: EMERGENCY MEDICINE

## 2021-03-30 PROCEDURE — 80053 COMPREHEN METABOLIC PANEL: CPT | Performed by: EMERGENCY MEDICINE

## 2021-03-30 RX ADMIN — SODIUM CHLORIDE 1000 ML: 0.9 INJECTION, SOLUTION INTRAVENOUS at 03:03

## 2021-04-09 PROCEDURE — 99285 EMERGENCY DEPT VISIT HI MDM: CPT

## 2021-04-09 PROCEDURE — U0002 COVID-19 LAB TEST NON-CDC: HCPCS | Performed by: PHYSICIAN ASSISTANT

## 2021-04-10 ENCOUNTER — HOSPITAL ENCOUNTER (EMERGENCY)
Facility: HOSPITAL | Age: 36
Discharge: HOME | End: 2021-04-10
Attending: EMERGENCY MEDICINE
Payer: MEDICAID

## 2021-04-10 VITALS
BODY MASS INDEX: 28.04 KG/M2 | DIASTOLIC BLOOD PRESSURE: 60 MMHG | OXYGEN SATURATION: 96 % | SYSTOLIC BLOOD PRESSURE: 119 MMHG | HEIGHT: 68 IN | RESPIRATION RATE: 20 BRPM | TEMPERATURE: 98 F | WEIGHT: 185 LBS | HEART RATE: 84 BPM

## 2021-04-10 DIAGNOSIS — J45.21 MILD INTERMITTENT ASTHMA WITH EXACERBATION: Primary | ICD-10-CM

## 2021-04-10 DIAGNOSIS — J40 BRONCHITIS: ICD-10-CM

## 2021-04-10 LAB
CTP QC/QA: YES
SARS-COV-2 RDRP RESP QL NAA+PROBE: NEGATIVE

## 2021-04-10 PROCEDURE — 25000242 PHARM REV CODE 250 ALT 637 W/ HCPCS: Performed by: EMERGENCY MEDICINE

## 2021-04-10 PROCEDURE — 94640 AIRWAY INHALATION TREATMENT: CPT

## 2021-04-10 PROCEDURE — 63600175 PHARM REV CODE 636 W HCPCS: Performed by: EMERGENCY MEDICINE

## 2021-04-10 RX ORDER — ALBUTEROL SULFATE 90 UG/1
1-2 AEROSOL, METERED RESPIRATORY (INHALATION) EVERY 6 HOURS PRN
Qty: 18 G | Refills: 11 | Status: SHIPPED | OUTPATIENT
Start: 2021-04-10 | End: 2021-10-15

## 2021-04-10 RX ORDER — IPRATROPIUM BROMIDE AND ALBUTEROL SULFATE 2.5; .5 MG/3ML; MG/3ML
3 SOLUTION RESPIRATORY (INHALATION)
Status: COMPLETED | OUTPATIENT
Start: 2021-04-10 | End: 2021-04-10

## 2021-04-10 RX ORDER — DOXYCYCLINE 100 MG/1
100 CAPSULE ORAL 2 TIMES DAILY
Qty: 10 CAPSULE | Refills: 0 | Status: SHIPPED | OUTPATIENT
Start: 2021-04-10 | End: 2021-04-15

## 2021-04-10 RX ORDER — PREDNISONE 50 MG/1
50 TABLET ORAL DAILY
Qty: 5 TABLET | Refills: 0 | Status: SHIPPED | OUTPATIENT
Start: 2021-04-10

## 2021-04-10 RX ADMIN — IPRATROPIUM BROMIDE AND ALBUTEROL SULFATE 3 ML: .5; 3 SOLUTION RESPIRATORY (INHALATION) at 12:04

## 2021-04-10 RX ADMIN — IPRATROPIUM BROMIDE AND ALBUTEROL SULFATE 3 ML: .5; 3 SOLUTION RESPIRATORY (INHALATION) at 02:04

## 2021-04-10 RX ADMIN — PREDNISONE 50 MG: 5 TABLET ORAL at 12:04

## 2021-04-10 RX ADMIN — IPRATROPIUM BROMIDE AND ALBUTEROL SULFATE 3 ML: .5; 3 SOLUTION RESPIRATORY (INHALATION) at 01:04

## 2021-06-23 DIAGNOSIS — B37.31 CANDIDA VAGINITIS: Primary | ICD-10-CM

## 2021-06-23 DIAGNOSIS — A60.00 GENITAL HERPES SIMPLEX, UNSPECIFIED SITE: ICD-10-CM

## 2021-06-23 RX ORDER — VALACYCLOVIR HYDROCHLORIDE 500 MG/1
500 TABLET, FILM COATED ORAL DAILY
Qty: 30 TABLET | Refills: 1 | Status: SHIPPED | OUTPATIENT
Start: 2021-06-23 | End: 2022-07-27 | Stop reason: SDUPTHER

## 2021-06-23 RX ORDER — FLUCONAZOLE 150 MG/1
150 TABLET ORAL DAILY
Qty: 1 TABLET | Refills: 0 | Status: SHIPPED | OUTPATIENT
Start: 2021-06-23 | End: 2021-06-24

## 2021-07-28 ENCOUNTER — HOSPITAL ENCOUNTER (OUTPATIENT)
Dept: RADIOLOGY | Facility: HOSPITAL | Age: 36
Discharge: HOME | End: 2021-07-28
Attending: GENERAL PRACTICE
Payer: MEDICAID

## 2021-07-28 DIAGNOSIS — Z01.818 ENCOUNTER FOR OTHER PREPROCEDURAL EXAMINATION: ICD-10-CM

## 2021-07-28 PROCEDURE — 71046 X-RAY EXAM CHEST 2 VIEWS: CPT | Mod: TC,FY

## 2021-07-28 PROCEDURE — 71046 X-RAY EXAM CHEST 2 VIEWS: CPT | Mod: 26,,, | Performed by: RADIOLOGY

## 2021-07-28 PROCEDURE — 71046 XR CHEST PA AND LATERAL: ICD-10-PCS | Mod: 26,,, | Performed by: RADIOLOGY

## 2021-10-06 ENCOUNTER — TELEPHONE (OUTPATIENT)
Dept: OBSTETRICS AND GYNECOLOGY | Facility: CLINIC | Age: 36
End: 2021-10-06

## 2021-10-12 ENCOUNTER — TELEPHONE (OUTPATIENT)
Dept: OBSTETRICS AND GYNECOLOGY | Facility: CLINIC | Age: 36
End: 2021-10-12

## 2021-10-15 ENCOUNTER — OFFICE VISIT (OUTPATIENT)
Dept: OBSTETRICS AND GYNECOLOGY | Facility: CLINIC | Age: 36
End: 2021-10-15
Payer: MEDICAID

## 2021-10-15 VITALS
HEIGHT: 68 IN | BODY MASS INDEX: 28.74 KG/M2 | SYSTOLIC BLOOD PRESSURE: 122 MMHG | DIASTOLIC BLOOD PRESSURE: 80 MMHG | WEIGHT: 189.63 LBS

## 2021-10-15 DIAGNOSIS — N76.2 ACUTE VULVITIS: Primary | ICD-10-CM

## 2021-10-15 PROCEDURE — 99213 OFFICE O/P EST LOW 20 MIN: CPT | Mod: PBBFAC | Performed by: OBSTETRICS & GYNECOLOGY

## 2021-10-15 PROCEDURE — 99213 PR OFFICE/OUTPT VISIT, EST, LEVL III, 20-29 MIN: ICD-10-PCS | Mod: S$PBB,,, | Performed by: OBSTETRICS & GYNECOLOGY

## 2021-10-15 PROCEDURE — 99213 OFFICE O/P EST LOW 20 MIN: CPT | Mod: S$PBB,,, | Performed by: OBSTETRICS & GYNECOLOGY

## 2021-10-15 PROCEDURE — 99999 PR PBB SHADOW E&M-EST. PATIENT-LVL III: CPT | Mod: PBBFAC,,, | Performed by: OBSTETRICS & GYNECOLOGY

## 2021-10-15 PROCEDURE — 99999 PR PBB SHADOW E&M-EST. PATIENT-LVL III: ICD-10-PCS | Mod: PBBFAC,,, | Performed by: OBSTETRICS & GYNECOLOGY

## 2021-10-15 RX ORDER — FAMOTIDINE 20 MG/1
20 TABLET, FILM COATED ORAL 2 TIMES DAILY
COMMUNITY
Start: 2021-07-12

## 2021-10-15 RX ORDER — ALBUTEROL SULFATE 90 UG/1
1-2 AEROSOL, METERED RESPIRATORY (INHALATION)
COMMUNITY
Start: 2021-04-10

## 2021-10-15 RX ORDER — METHOCARBAMOL 500 MG/1
TABLET, FILM COATED ORAL
COMMUNITY
Start: 2021-05-15

## 2021-10-15 RX ORDER — QUETIAPINE FUMARATE 50 MG/1
50 TABLET, FILM COATED ORAL NIGHTLY
COMMUNITY
Start: 2021-09-16 | End: 2023-03-27

## 2021-10-15 RX ORDER — IBUPROFEN 600 MG/1
TABLET ORAL
COMMUNITY
Start: 2021-05-15 | End: 2023-03-27

## 2021-10-15 RX ORDER — PANTOPRAZOLE SODIUM 40 MG/1
40 TABLET, DELAYED RELEASE ORAL DAILY
COMMUNITY
Start: 2021-05-17

## 2021-10-15 RX ORDER — ONDANSETRON 8 MG/1
TABLET, ORALLY DISINTEGRATING ORAL
COMMUNITY
Start: 2021-09-02 | End: 2023-03-27

## 2021-12-14 ENCOUNTER — TELEPHONE (OUTPATIENT)
Dept: OBSTETRICS AND GYNECOLOGY | Facility: CLINIC | Age: 36
End: 2021-12-14
Payer: MEDICAID

## 2021-12-17 ENCOUNTER — OFFICE VISIT (OUTPATIENT)
Dept: OBSTETRICS AND GYNECOLOGY | Facility: CLINIC | Age: 36
End: 2021-12-17
Payer: MEDICAID

## 2021-12-17 VITALS
BODY MASS INDEX: 30.14 KG/M2 | WEIGHT: 198.19 LBS | DIASTOLIC BLOOD PRESSURE: 98 MMHG | SYSTOLIC BLOOD PRESSURE: 122 MMHG

## 2021-12-17 DIAGNOSIS — N63.20 LEFT BREAST LUMP: Primary | ICD-10-CM

## 2021-12-17 PROCEDURE — 99213 PR OFFICE/OUTPT VISIT, EST, LEVL III, 20-29 MIN: ICD-10-PCS | Mod: S$PBB,,, | Performed by: PHYSICIAN ASSISTANT

## 2021-12-17 PROCEDURE — 99213 OFFICE O/P EST LOW 20 MIN: CPT | Mod: S$PBB,,, | Performed by: PHYSICIAN ASSISTANT

## 2021-12-17 PROCEDURE — 99214 OFFICE O/P EST MOD 30 MIN: CPT | Mod: PBBFAC | Performed by: PHYSICIAN ASSISTANT

## 2021-12-17 PROCEDURE — 99999 PR PBB SHADOW E&M-EST. PATIENT-LVL IV: CPT | Mod: PBBFAC,,, | Performed by: PHYSICIAN ASSISTANT

## 2021-12-17 PROCEDURE — 99999 PR PBB SHADOW E&M-EST. PATIENT-LVL IV: ICD-10-PCS | Mod: PBBFAC,,, | Performed by: PHYSICIAN ASSISTANT

## 2022-02-14 ENCOUNTER — HOSPITAL ENCOUNTER (OUTPATIENT)
Dept: RADIOLOGY | Facility: HOSPITAL | Age: 37
Discharge: HOME | End: 2022-02-14
Attending: PHYSICIAN ASSISTANT
Payer: MEDICAID

## 2022-02-14 VITALS — WEIGHT: 198.19 LBS | BODY MASS INDEX: 30.04 KG/M2 | HEIGHT: 68 IN

## 2022-02-14 DIAGNOSIS — N63.20 LEFT BREAST LUMP: ICD-10-CM

## 2022-02-14 PROCEDURE — 76642 US BREAST LEFT LIMITED: ICD-10-PCS | Mod: 26,LT,, | Performed by: RADIOLOGY

## 2022-02-14 PROCEDURE — 77066 MAMMO DIGITAL DIAGNOSTIC BILAT WITH TOMO: ICD-10-PCS | Mod: 26,,, | Performed by: RADIOLOGY

## 2022-02-14 PROCEDURE — 77062 MAMMO DIGITAL DIAGNOSTIC BILAT WITH TOMO: ICD-10-PCS | Mod: 26,,, | Performed by: RADIOLOGY

## 2022-02-14 PROCEDURE — 77066 DX MAMMO INCL CAD BI: CPT | Mod: 26,,, | Performed by: RADIOLOGY

## 2022-02-14 PROCEDURE — 77062 BREAST TOMOSYNTHESIS BI: CPT | Mod: 26,,, | Performed by: RADIOLOGY

## 2022-02-14 PROCEDURE — 76642 ULTRASOUND BREAST LIMITED: CPT | Mod: 26,LT,, | Performed by: RADIOLOGY

## 2022-02-14 PROCEDURE — 76642 ULTRASOUND BREAST LIMITED: CPT | Mod: TC,LT

## 2022-02-14 PROCEDURE — 77062 BREAST TOMOSYNTHESIS BI: CPT | Mod: TC

## 2022-06-28 ENCOUNTER — HOSPITAL ENCOUNTER (EMERGENCY)
Facility: HOSPITAL | Age: 37
Discharge: HOME | End: 2022-06-28
Attending: EMERGENCY MEDICINE
Payer: MEDICAID

## 2022-06-28 VITALS
TEMPERATURE: 99 F | HEART RATE: 76 BPM | WEIGHT: 189 LBS | RESPIRATION RATE: 17 BRPM | SYSTOLIC BLOOD PRESSURE: 123 MMHG | OXYGEN SATURATION: 99 % | DIASTOLIC BLOOD PRESSURE: 93 MMHG | BODY MASS INDEX: 28.64 KG/M2 | HEIGHT: 68 IN

## 2022-06-28 DIAGNOSIS — R11.2 NON-INTRACTABLE VOMITING WITH NAUSEA, UNSPECIFIED VOMITING TYPE: Primary | ICD-10-CM

## 2022-06-28 PROCEDURE — 99283 EMERGENCY DEPT VISIT LOW MDM: CPT

## 2022-06-28 PROCEDURE — 25000003 PHARM REV CODE 250

## 2022-06-28 RX ORDER — DICYCLOMINE HYDROCHLORIDE 20 MG/1
20 TABLET ORAL 2 TIMES DAILY PRN
Qty: 20 TABLET | Refills: 0 | Status: SHIPPED | OUTPATIENT
Start: 2022-06-28 | End: 2022-07-08

## 2022-06-28 RX ORDER — ONDANSETRON 4 MG/1
4 TABLET, ORALLY DISINTEGRATING ORAL
Status: COMPLETED | OUTPATIENT
Start: 2022-06-28 | End: 2022-06-28

## 2022-06-28 RX ORDER — ACETAMINOPHEN 325 MG/1
650 TABLET ORAL
Status: COMPLETED | OUTPATIENT
Start: 2022-06-28 | End: 2022-06-28

## 2022-06-28 RX ORDER — IBUPROFEN 600 MG/1
600 TABLET ORAL EVERY 6 HOURS PRN
Qty: 20 TABLET | Refills: 0 | Status: SHIPPED | OUTPATIENT
Start: 2022-06-28 | End: 2023-03-27

## 2022-06-28 RX ORDER — ONDANSETRON 4 MG/1
8 TABLET, ORALLY DISINTEGRATING ORAL EVERY 6 HOURS PRN
Qty: 12 TABLET | Refills: 0 | Status: SHIPPED | OUTPATIENT
Start: 2022-06-28 | End: 2023-03-27

## 2022-06-28 RX ORDER — DICYCLOMINE HYDROCHLORIDE 10 MG/1
10 CAPSULE ORAL
Status: COMPLETED | OUTPATIENT
Start: 2022-06-28 | End: 2022-06-28

## 2022-06-28 RX ADMIN — DICYCLOMINE HYDROCHLORIDE 10 MG: 10 CAPSULE ORAL at 06:06

## 2022-06-28 RX ADMIN — ONDANSETRON 4 MG: 4 TABLET, ORALLY DISINTEGRATING ORAL at 06:06

## 2022-06-28 RX ADMIN — ACETAMINOPHEN 650 MG: 325 TABLET ORAL at 06:06

## 2022-06-28 NOTE — ED TRIAGE NOTES
Pt presents to the ED with complaints of  eating reynolds bits on her salad which she later noticed to be molded.   Pt c/o nausea, vomiting, diarrhea and fatigue.  Pt denies any other symptoms  Pt AAOX4

## 2022-06-28 NOTE — DISCHARGE INSTRUCTIONS
Thank you for coming to our Emergency Department today. It is important to remember that some problems are difficult to diagnose and may not be found during your first visit. Be sure to follow up with your primary care doctor and review any labs/imaging that was performed with them. If you do not have a primary care doctor, you may contact the one listed on your discharge paperwork or you may also call the [facility] Appointment Desk at [phone] to schedule an appointment with one.     All medications may potentially have side effects and it is impossible to predict which medications may give you side effects. If you feel that you are having a negative effect of any medication you should immediately stop taking them and seek medical attention.    Return to the ER with any questions/concerns, new/concerning symptoms, worsening or failure to improve. Do not drive or make any important decisions for 24 hours if you have received any pain medications, sedatives or mood altering drugs during your ER visit.

## 2022-06-28 NOTE — ED PROVIDER NOTES
"Encounter Date: 2022       History     Chief Complaint   Patient presents with    Nausea    Vomiting    Headache     Pt reports eating reynolds bits on her salad which she later noticed to be molded. Pt c/o nausea, vomiting, diarrhea and fatigue.      37-year-old female with a past medical history of diabetes, GERD, chronic back pain, mental disorder, hypertension, fibromyalgia, and asthma presents to the ED for nausea and vomiting.  Per patient around 2:00 p.m. today she was making a salad for her and her son with reynolds bits and after one bite developed nausea and vomiting.  Patient states after looking the reynolds bits had mold on them.  Patient admits to nausea and 3 episodes of vomiting.  Patient also complaining of a frontal headache that she describes as throbbing that comes and goes, she rates as 6/10.  She has not tried anything to make this better.  She denies anything make it worse.  Patient also admits to abdominal pain that she describes is achy it is constant, she rates as 6/10.  Patient states her son is having similar symptoms after consuming the reynolds bits.  Patient denies diarrhea, constipation, shortness of breath, chest pain, chills, fevers, and urinary symptoms.         Review of patient's allergies indicates:  No Known Allergies  Past Medical History:   Diagnosis Date    Asthma     as a child    Back pain, chronic 2013    " i have herinated disk"     Diabetes mellitus     Fibromyalgia     GERD (gastroesophageal reflux disease)     Herpes simplex without mention of complication     History of pulmonary embolus during pregnancy     after her second  section    Hypertension     off meds x 1 year    Mental disorder     depression and migraine headaches     Past Surgical History:   Procedure Laterality Date    ABDOMINOPLASTY  2021     SECTION      DILATION AND CURETTAGE OF UTERUS      for elective abortions    ESOPHAGOGASTRODUODENOSCOPY  2019 "    ESOPHAGOGASTRODUODENOSCOPY  2019    ESOPHAGOGASTRODUODENOSCOPY N/A 2019    Procedure: EGD (ESOPHAGOGASTRODUODENOSCOPY);  Surgeon: Doni Marcos MD;  Location: [facility];  Service: Endoscopy;  Laterality: N/A;    Gastric sleeve  2020    HTA  2012    Dr Jiang    HYSTERECTOMY  2013    [facility]    LAPAROSCOPIC CHOLECYSTECTOMY N/A 2019    Procedure: CHOLECYSTECTOMY, LAPAROSCOPIC;  Surgeon: Henry Dillard MD;  Location[facility] OR;  Service: General;  Laterality: N/A;  RN PRE OP 19    marsupialization of Bartholin's cyst in  and 2005 and     TUBAL LIGATION      uterine ablation  12     Family History   Problem Relation Age of Onset    Diabetes Mother     Hypertension Mother     Stroke Mother     Breast cancer Maternal Aunt      Social History     Tobacco Use    Smoking status: Former Smoker     Quit date: 2015     Years since quittin.4    Smokeless tobacco: Never Used    Tobacco comment: One- 2  cigarettes  / day   Substance Use Topics    Alcohol use: Yes     Alcohol/week: 1.0 standard drink     Types: 1 Shots of liquor per week     Comment: rarely    Drug use: Yes     Types: Marijuana     Review of Systems   Constitutional: Positive for diaphoresis. Negative for chills and fever.   HENT: Negative for sore throat and trouble swallowing.    Respiratory: Negative for shortness of breath.    Cardiovascular: Negative for chest pain.   Gastrointestinal: Positive for abdominal pain, nausea and vomiting. Negative for constipation and diarrhea.   Genitourinary: Negative for decreased urine volume, difficulty urinating, dysuria, frequency and urgency.   Musculoskeletal: Negative for myalgias.   Neurological: Positive for headaches.       Physical Exam     Initial Vitals [22 1744]   BP Pulse Resp Temp SpO2   (!) 127/94 79 15 99 °F (37.2 °C) 99 %      MAP       --         Physical Exam    Nursing note and vitals reviewed.  Constitutional:  She appears well-developed and well-nourished. She is not diaphoretic. She is active. She does not appear ill. No distress.   HENT:   Head: Normocephalic and atraumatic.   Right Ear: External ear normal.   Left Ear: External ear normal.   Nose: Nose normal.   Eyes: Conjunctivae, EOM and lids are normal. Pupils are equal, round, and reactive to light. Right eye exhibits no discharge. Left eye exhibits no discharge.   Neck: Neck supple.   Normal range of motion.   Full passive range of motion without pain.     Cardiovascular: Normal rate and regular rhythm.   Pulmonary/Chest: Effort normal and breath sounds normal. No respiratory distress.   Abdominal: Abdomen is soft. She exhibits no distension. There is abdominal tenderness in the suprapubic area.   Musculoskeletal:         General: Normal range of motion.      Cervical back: Full passive range of motion without pain, normal range of motion and neck supple.     Neurological: She is alert and oriented to person, place, and time.   Skin: Skin is dry. Capillary refill takes less than 2 seconds.         ED Course   Procedures  Labs Reviewed - No data to display       Imaging Results    None          Medications   dicyclomine capsule 10 mg (10 mg Oral Given 6/28/22 1814)   ondansetron disintegrating tablet 4 mg (4 mg Oral Given 6/28/22 1814)   acetaminophen tablet 650 mg (650 mg Oral Given 6/28/22 1814)     Medical Decision Making:   Initial Assessment:   37-year-old female with a past medical history of diabetes, GERD, chronic back pain, mental disorder, hypertension, fibromyalgia, and asthma presents to the ED for nausea and vomiting.  Patient's chart and medical history reviewed.  Differential Diagnosis:   Gastroenteritis  Food poisoning  Gastritis   ED Management:  Patient's vitals reviewed.  She is afebrile, no respiratory distress, and nontoxic-appearing in the ED.  Patient given Bentyl, Zofran, and Tylenol for her headache and GI symptoms.  Patient states she is  feeling better.  Patient has not vomited since receiving the medicines.  Patient will be sent home with Zofran, high-dose Motrin, and Bentyl as needed for nausea and stomach pain. Patient agrees with this plan. Discussed with her strict return precautions, she verbalized understanding. Patient is stable for discharge.                         Clinical Impression:   Final diagnoses:  [R11.2] Non-intractable vomiting with nausea, unspecified vomiting type (Primary)          ED Disposition Condition    Discharge Stable        ED Prescriptions     Medication Sig Dispense Start Date End Date Auth. Provider    ondansetron (ZOFRAN-ODT) 4 MG TbDL Take 2 tablets (8 mg total) by mouth every 6 (six) hours as needed (Nausea/vomiting). 12 tablet 6/28/2022  Alayna Holdsworth, PA-C    dicyclomine (BENTYL) 20 mg tablet Take 1 tablet (20 mg total) by mouth 2 (two) times daily as needed (Stomach pain). 20 tablet 6/28/2022 7/8/2022 Alayna Holdsworth, PA-C    ibuprofen (ADVIL,MOTRIN) 600 MG tablet Take 1 tablet (600 mg total) by mouth every 6 (six) hours as needed for Pain. 20 tablet 6/28/2022  Alayna Holdsworth, PA-C        Follow-up Information     Follow up With Specialties Details Why Contact Info    Vinod Tiwari III, MD Internal Medicine   [address] [facility]  [place] [address]  [phone]             Alayna Holdsworth, PA-C  06/28/22 7105

## 2022-07-27 DIAGNOSIS — A60.00 GENITAL HERPES SIMPLEX, UNSPECIFIED SITE: ICD-10-CM

## 2022-07-27 RX ORDER — VALACYCLOVIR HYDROCHLORIDE 500 MG/1
500 TABLET, FILM COATED ORAL DAILY
Qty: 30 TABLET | Refills: 1 | Status: SHIPPED | OUTPATIENT
Start: 2022-07-27 | End: 2022-09-20

## 2022-07-27 NOTE — TELEPHONE ENCOUNTER
----- Message from Robby Dempsey sent at 7/26/2022  1:50 PM CDT -----  Type: Patient Call Back    Who called:self    What is the request in detail:Pt states she called twice yesterday requesting a refill for valACYclovir (VALTREX) 500 MG tablet, but has not received any updates. Please call.    Can the clinic reply by [facility]?no    Would the patient rather a call back or a response via [facility]? call    Best call back number:[phone] (M)

## 2023-03-05 ENCOUNTER — HOSPITAL ENCOUNTER (EMERGENCY)
Facility: HOSPITAL | Age: 38
Discharge: HOME | End: 2023-03-05
Attending: EMERGENCY MEDICINE
Payer: MEDICAID

## 2023-03-05 VITALS
BODY MASS INDEX: 28.79 KG/M2 | OXYGEN SATURATION: 98 % | WEIGHT: 190 LBS | HEIGHT: 68 IN | TEMPERATURE: 99 F | RESPIRATION RATE: 18 BRPM | DIASTOLIC BLOOD PRESSURE: 83 MMHG | SYSTOLIC BLOOD PRESSURE: 118 MMHG | HEART RATE: 81 BPM

## 2023-03-05 DIAGNOSIS — L02.412 ABSCESS OF LEFT AXILLA: ICD-10-CM

## 2023-03-05 DIAGNOSIS — L72.0 INFECTED EPIDERMOID CYST: Primary | ICD-10-CM

## 2023-03-05 DIAGNOSIS — L08.9 INFECTED EPIDERMOID CYST: Primary | ICD-10-CM

## 2023-03-05 PROCEDURE — 25000003 PHARM REV CODE 250: Performed by: PHYSICIAN ASSISTANT

## 2023-03-05 PROCEDURE — 99284 EMERGENCY DEPT VISIT MOD MDM: CPT | Mod: 25

## 2023-03-05 PROCEDURE — 10060 I&D ABSCESS SIMPLE/SINGLE: CPT

## 2023-03-05 RX ORDER — SULFAMETHOXAZOLE AND TRIMETHOPRIM 800; 160 MG/1; MG/1
1 TABLET ORAL 2 TIMES DAILY
Qty: 20 TABLET | Refills: 0 | Status: SHIPPED | OUTPATIENT
Start: 2023-03-05 | End: 2023-03-15

## 2023-03-05 RX ORDER — SULFAMETHOXAZOLE AND TRIMETHOPRIM 800; 160 MG/1; MG/1
1 TABLET ORAL
Status: COMPLETED | OUTPATIENT
Start: 2023-03-05 | End: 2023-03-05

## 2023-03-05 RX ORDER — LIDOCAINE HYDROCHLORIDE 10 MG/ML
10 INJECTION INFILTRATION; PERINEURAL
Status: COMPLETED | OUTPATIENT
Start: 2023-03-05 | End: 2023-03-05

## 2023-03-05 RX ORDER — ONDANSETRON 4 MG/1
4 TABLET, ORALLY DISINTEGRATING ORAL
Status: COMPLETED | OUTPATIENT
Start: 2023-03-05 | End: 2023-03-05

## 2023-03-05 RX ORDER — MELOXICAM 7.5 MG/1
7.5 TABLET ORAL DAILY
Qty: 12 TABLET | Refills: 0 | Status: SHIPPED | OUTPATIENT
Start: 2023-03-05

## 2023-03-05 RX ORDER — HYDROCODONE BITARTRATE AND ACETAMINOPHEN 5; 325 MG/1; MG/1
1 TABLET ORAL
Status: COMPLETED | OUTPATIENT
Start: 2023-03-05 | End: 2023-03-05

## 2023-03-05 RX ADMIN — LIDOCAINE HYDROCHLORIDE 10 ML: 10 INJECTION, SOLUTION INFILTRATION; PERINEURAL at 02:03

## 2023-03-05 RX ADMIN — SULFAMETHOXAZOLE AND TRIMETHOPRIM 1 TABLET: 800; 160 TABLET ORAL at 02:03

## 2023-03-05 RX ADMIN — ONDANSETRON 4 MG: 4 TABLET, ORALLY DISINTEGRATING ORAL at 02:03

## 2023-03-05 RX ADMIN — HYDROCODONE BITARTRATE AND ACETAMINOPHEN 1 TABLET: 5; 325 TABLET ORAL at 02:03

## 2023-03-05 NOTE — ED PROVIDER NOTES
"Encounter Date: 3/5/2023    SCRIBE #1 NOTE: I, Christina Faulkner, am scribing for, and in the presence of,  Stephan Roach PA-C. I have scribed the following portions of the note - Other sections scribed: HPI and ROS.     History     Chief Complaint   Patient presents with    Abscess     Pt has abscess under left armpit x5 days and describes purulent drainage. Pt denies having this before.      Viviana Aguayo is a 37 y.o. female, with a PMHx of DM, HTN, and others, who presents to the ED with abscess to her axilla region that is throbbing /stabbing to her left arm. Patient reports she shaved "a while ago" and was seeing some drainage to the abscess last night. Per patient she has had an abscess to axilla before but it was smaller and never this bad. No other exacerbating or alleviating factors. Denies other associated symptoms.    The history is provided by the patient.   Review of patient's allergies indicates:  No Known Allergies  Past Medical History:   Diagnosis Date    Asthma     as a child    Back pain, chronic 2013    " i have herinated disk"     Diabetes mellitus     Fibromyalgia     GERD (gastroesophageal reflux disease)     Herpes simplex without mention of complication     History of pulmonary embolus during pregnancy 2009    after her second  section    Hypertension     off meds x 1 year    Mental disorder     depression and migraine headaches     Past Surgical History:   Procedure Laterality Date    ABDOMINOPLASTY  2021     SECTION      DILATION AND CURETTAGE OF UTERUS      for elective abortions    ESOPHAGOGASTRODUODENOSCOPY  2019    ESOPHAGOGASTRODUODENOSCOPY  2019    ESOPHAGOGASTRODUODENOSCOPY N/A 2019    Procedure: EGD (ESOPHAGOGASTRODUODENOSCOPY);  Surgeon: Doni Marcos MD;  Location: [facility];  Service: Endoscopy;  Laterality: N/A;    Gastric sleeve  2020    HTA  2012    Dr Jiang    HYSTERECTOMY  2013    [facility]    LAPAROSCOPIC " CHOLECYSTECTOMY N/A 2019    Procedure: CHOLECYSTECTOMY, LAPAROSCOPIC;  Surgeon: Henry Dillard MD;  Location: [facility] OR;  Service: General;  Laterality: N/A;  RN PRE OP 19    marsupialization of Bartholin's cyst in  and 2005 and     TUBAL LIGATION  2009    uterine ablation  10     Family History   Problem Relation Age of Onset    Diabetes Mother     Hypertension Mother     Stroke Mother     Breast cancer Maternal Aunt      Social History     Tobacco Use    Smoking status: Former     Types: Cigarettes     Quit date: 2015     Years since quittin.1    Smokeless tobacco: Never    Tobacco comments:     One- 2  cigarettes  / day   Substance Use Topics    Alcohol use: Yes     Alcohol/week: 1.0 standard drink     Types: 1 Shots of liquor per week     Comment: rarely    Drug use: Yes     Types: Marijuana     Review of Systems   Constitutional:  Negative for fever.   HENT:  Negative for congestion, sore throat and trouble swallowing.    Respiratory:  Negative for cough and shortness of breath.    Cardiovascular:  Negative for chest pain.   Gastrointestinal:  Negative for abdominal pain, constipation, diarrhea, nausea and vomiting.   Genitourinary:  Negative for dysuria, flank pain, frequency and urgency.   Musculoskeletal:  Negative for back pain.   Skin:  Negative for rash.        (+) abscess to left axilla region   Neurological:  Negative for headaches.   All other systems reviewed and are negative.    Physical Exam     Initial Vitals [23 1352]   BP Pulse Resp Temp SpO2   120/78 86 18 98.2 °F (36.8 °C) 100 %      MAP       --         Physical Exam    Nursing note and vitals reviewed.  Constitutional: She appears well-developed and well-nourished. She is not diaphoretic. No distress.   HENT:   Head: Atraumatic.   Right Ear: External ear normal.   Left Ear: External ear normal.   Eyes: Conjunctivae and EOM are normal.   Neck: No tracheal deviation present.   Normal range of  motion.  Cardiovascular:  Normal rate and regular rhythm.           Pulmonary/Chest: No accessory muscle usage or stridor. No tachypnea. No respiratory distress.   Musculoskeletal:         General: No edema. Normal range of motion.      Cervical back: Normal range of motion.     Neurological: She is alert and oriented to person, place, and time. She displays no tremor. She displays no seizure activity. Coordination and gait normal.   Skin: Skin is intact. Capillary refill takes less than 2 seconds.   1 cm very well-defined area of swelling with associated erythema and fluctuance to the left axilla.  No active drainage.       ED Course   I & D - Incision and Drainage    Date/Time: 3/5/2023 3:16 PM  Location procedure was performed: [facility] EMERGENCY DEPARTMENT  Performed by: Stephan Roach PA-C  Authorized by: Erik Ulrich MD   Consent Done: Yes  Consent: Verbal consent obtained.  Consent given by: patient  Type: cyst  Body area: head/neck  Location details: scalp  Anesthesia: local infiltration    Anesthesia:  Local Anesthetic: lidocaine 1% without epinephrine  Scalpel size: 11  Incision type: single straight  Drainage: pus  Drainage amount: scant  Wound treatment: incision, wound left open, deloculation, expression of material and wound packed  Packing material: 1/4 in gauze  Complications: No  Specimens: No  Implants: No  Patient tolerance: Patient tolerated the procedure well with no immediate complications      Labs Reviewed - No data to display       Imaging Results    None          Medications   LIDOcaine HCL 10 mg/ml (1%) injection 10 mL (10 mLs Infiltration Given by Other 3/5/23 0036)   sulfamethoxazole-trimethoprim 800-160mg per tablet 1 tablet (1 tablet Oral Given 3/5/23 1456)   HYDROcodone-acetaminophen 5-325 mg per tablet 1 tablet (1 tablet Oral Given 3/5/23 1456)   ondansetron disintegrating tablet 4 mg (4 mg Oral Given 3/5/23 1456)     Medical Decision Making:   History:   Old Medical Records: I  decided to obtain old medical records.  ED Management:  Epidermoid cyst with associated abscess and overlying cellulitis.  No joint involvement or systemic symptoms.  No necrotizing fasciitis.  Packing removal in 2 days.        Scribe Attestation:   Scribe #1: I performed the above scribed service and the documentation accurately describes the services I performed. I attest to the accuracy of the note.            I, Stephan Roach PA-C, personally performed the services described in this documentation.  All medical record entries made by the scribe were at my direction and in my presence.  I have reviewed the chart and agree that the record reflects my personal performance and is accurate and complete.        Clinical Impression:   Final diagnoses:  [L72.0, L08.9] Infected epidermoid cyst (Primary)  [L02.412] Abscess of left axilla        ED Disposition Condition    Discharge Stable          ED Prescriptions       Medication Sig Dispense Start Date End Date Auth. Provider    sulfamethoxazole-trimethoprim 800-160mg (BACTRIM DS) 800-160 mg Tab Take 1 tablet by mouth 2 (two) times daily. for 10 days 20 tablet 3/5/2023 3/15/2023 Stephan Roach PA-C    meloxicam (MOBIC) 7.5 MG tablet Take 1 tablet (7.5 mg total) by mouth once daily. 12 tablet 3/5/2023 -- Stephan Roach PA-C          Follow-up Information       Follow up With Specialties Details Why Contact Info    Vinod Tiwari III, MD Internal Medicine Schedule an appointment as soon as possible for a visit in 2 days For re-evaluation, AND packing removal in 2 days [address]  [facility]  [place] [address]  [phone]      [facility] [place] Medicine Go to  If symptoms worsen [facility] [id number] [phone]             Stephan Roach PA-C  03/05/23 5709       Stephan Roach PA-C  03/05/23 2704

## 2023-03-05 NOTE — DISCHARGE INSTRUCTIONS

## 2023-03-21 NOTE — PROGRESS NOTES
"  Subjective:       Patient ID: Dimas Aguayo is a 32 y.o. female.    Chief Complaint:  Gynecologic Exam (Last pap was 04/01/15)      History of Present Illness  HPI  Annual Exam-Premenopausal  Patient presents for annual exam. The patient has no complaints today except for vulva irritation. The patient is sexually active. GYN screening history: last pap: approximate date 2015 and was normal. The patient wears seatbelts: yes. The patient participates in regular exercise: no. Has the patient ever been transfused or tattooed?: yes. The patient reports that there is not domestic violence in her life.    Status post supracervical abdominal hysterectomy    GYN & OB HistoryPatient's last menstrual period was 2013.   Date of Last Pap: 2015    OB History    Para Term  AB Living   3 2 2   1 2   SAB TAB Ectopic Multiple Live Births           2      # Outcome Date GA Lbr Doug/2nd Weight Sex Delivery Anes PTL Lv   3 Term 09 40w0d  3.714 kg (8 lb 3 oz) M Vag-Spont EPI N CLIVE   2 AB 2006           1 Term 01 40w0d  3.657 kg (8 lb 1 oz) F Vag-Spont EPI N CLIVE        Past Medical History:   Diagnosis Date    Asthma     as a child    Back pain, chronic 2013    " i have herinated disk"     Diabetes mellitus     Fibromyalgia     GERD (gastroesophageal reflux disease)     Herpes simplex without mention of complication     History of pulmonary embolus during pregnancy 2009    after her second  section    Hypertension     off meds x 1 year    Mental disorder     depression and migraine headaches       Past Surgical History:   Procedure Laterality Date     SECTION      DILATION AND CURETTAGE OF UTERUS      for elective abortions    [facility]  2012    Dr Jiang    [facility]  2013    [facility]    marsupialization of Bartholin's cyst in  and 2005 and     TUBAL LIGATION  2009    uterine ablation  12       Family History   Problem Relation Age " of Onset    Diabetes Mother     Hypertension Mother     Stroke Mother        Social History     Social History    Marital status: Single     Spouse name: N/A    Number of children: N/A    Years of education: N/A     Social History Main Topics    Smoking status: Former Smoker     Quit date: 1/1/2015    Smokeless tobacco: Never Used      Comment: One- 2  cigarettes  / day    Alcohol use 0.6 oz/week     1 Shots of liquor per week      Comment: occassionally    Drug use: No    Sexual activity: Yes     Partners: Male     Birth control/ protection: Surgical     Other Topics Concern    None     Social History Narrative    Working as a make-up artist    New partner for the past two 2014       Current Outpatient Prescriptions   Medication Sig Dispense Refill    gabapentin (NEURONTIN) 600 MG tablet       lactulose (CHRONULAC) 10 gram/15 mL solution TAKE 30 ML BY MOUTH TWICE DAILY  5    meloxicam (MOBIC) 15 MG tablet       metformin (GLUCOPHAGE) 1000 MG tablet       pantoprazole (PROTONIX) 20 MG tablet       predniSONE (DELTASONE) 20 MG tablet Take 20 mg by mouth once daily.  0    pregabalin (LYRICA) 300 MG Cap Take 300 mg by mouth 3 (three) times daily.      triamterene-hydrochlorothiazide 37.5-25 mg (DYAZIDE) 37.5-25 mg per capsule TAKE 1 CAPSULE DAILY ORAL FOR 30 DAYS  5    TRUERESULT BLOOD GLUCOSE SYSTM kit USE WITH TEST STRIPS THREE TIMES A DAY  0    TRUETEST TEST STRIPS Strp TEST BLOOD GLUCOSE THREE TIMES A DAY  5    ULTRA THIN LANCETS 30 gauge Misc USE TO TEST BLOOD GLUCOSE THREE TIMES A DAY  5    zolpidem (AMBIEN) 10 mg Tab        No current facility-administered medications for this visit.        Review of patient's allergies indicates:  No Known Allergies      Review of Systems  Review of Systems   Constitutional: Negative for activity change, appetite change, chills, fatigue, fever and unexpected weight change.   HENT: Negative for mouth sores.    Respiratory: Negative for cough, shortness of  breath and wheezing.    Cardiovascular: Negative for chest pain and palpitations.   Gastrointestinal: Negative for abdominal pain, bloating, blood in stool, constipation, nausea and vomiting.   Endocrine: Negative for diabetes and hot flashes.   Genitourinary: Negative for dysuria, frequency, hematuria, menorrhagia, menstrual problem, urgency, vaginal bleeding, vaginal discharge, vaginal pain, dysmenorrhea, urinary incontinence, postcoital bleeding and vaginal odor.   Musculoskeletal: Negative for back pain and myalgias.   Skin:  Negative for rash.   Neurological: Negative for seizures and headaches.   Psychiatric/Behavioral: Negative for depression and sleep disturbance. The patient is nervous/anxious.    Breast: Negative for breast mass, breast pain and nipple discharge          Objective:    Physical Exam:   Constitutional: She appears well-developed and well-nourished. No distress.    HENT:   Head: Normocephalic and atraumatic.    Eyes: EOM are normal.    Neck: Normal range of motion.     Pulmonary/Chest: Effort normal. No respiratory distress.   Breasts: Non-tender, no engorgement, no masses, no retraction, no discharge. Negative for lymphadenopathy.         Abdominal: Soft. She exhibits no distension. There is no tenderness. There is no rebound and no guarding.     Genitourinary: Vagina normal and uterus normal. No vaginal discharge found.   Genitourinary Comments: Vulva without any obvious lesions.  Vaginal vault with good support.  Minimal white discharge noted.  No obvious lesion.  Cervix and Uterus are surgically.  Adnexa is without any masses or tenderness.           Musculoskeletal: Normal range of motion.       Neurological: She is alert.    Skin: Skin is warm and dry.    Psychiatric: She has a normal mood and affect.          Assessment:        1. Well woman exam with routine gynecological exam    2.  Status post supracervical abdominal hysterectomy         Plan:          I have discussed with the  patient her condition.  Monthly breast examination was instructed, discussed, and encouraged.  Patient was encouraged to consume a low-calorie, low fat diet, and to increase of physical activity.  Healthy habits encouraged.  A Pap smear was performed.  Mammogram was not ordered because of the combination of her age and risk factors.  Gonorrhea and Chlamydia testing not performed.  HIV test not ordered.  Patient is to continue her medications as prescribed by her PCP.  She will come back to see me in one year for her annual visit.  She can come back to see me sooner as necessary.  All of her questions were answered appropriately to her satisfaction.    We will contract her for results of her Pap  No douching         breast

## 2023-03-27 ENCOUNTER — OFFICE VISIT (OUTPATIENT)
Dept: OBSTETRICS AND GYNECOLOGY | Facility: CLINIC | Age: 38
End: 2023-03-27
Payer: MEDICAID

## 2023-03-27 VITALS
DIASTOLIC BLOOD PRESSURE: 84 MMHG | WEIGHT: 200.63 LBS | SYSTOLIC BLOOD PRESSURE: 130 MMHG | HEIGHT: 68 IN | BODY MASS INDEX: 30.41 KG/M2

## 2023-03-27 DIAGNOSIS — Z87.59 HISTORY OF PULMONARY EMBOLUS DURING PREGNANCY: ICD-10-CM

## 2023-03-27 DIAGNOSIS — Z01.419 WELL WOMAN EXAM WITH ROUTINE GYNECOLOGICAL EXAM: Primary | ICD-10-CM

## 2023-03-27 DIAGNOSIS — M16.11 ARTHRITIS OF RIGHT HIP: ICD-10-CM

## 2023-03-27 DIAGNOSIS — Z12.31 SCREENING MAMMOGRAM, ENCOUNTER FOR: ICD-10-CM

## 2023-03-27 DIAGNOSIS — Z86.711 HISTORY OF PULMONARY EMBOLUS DURING PREGNANCY: ICD-10-CM

## 2023-03-27 DIAGNOSIS — E11.9 CONTROLLED TYPE 2 DIABETES MELLITUS WITHOUT COMPLICATION, WITHOUT LONG-TERM CURRENT USE OF INSULIN: ICD-10-CM

## 2023-03-27 PROCEDURE — 99999 PR PBB SHADOW E&M-EST. PATIENT-LVL IV: CPT | Mod: PBBFAC,,, | Performed by: OBSTETRICS & GYNECOLOGY

## 2023-03-27 PROCEDURE — 3075F PR MOST RECENT SYSTOLIC BLOOD PRESS GE 130-139MM HG: ICD-10-PCS | Mod: CPTII,,, | Performed by: OBSTETRICS & GYNECOLOGY

## 2023-03-27 PROCEDURE — 1160F RVW MEDS BY RX/DR IN RCRD: CPT | Mod: CPTII,,, | Performed by: OBSTETRICS & GYNECOLOGY

## 2023-03-27 PROCEDURE — 87624 HPV HI-RISK TYP POOLED RSLT: CPT | Performed by: OBSTETRICS & GYNECOLOGY

## 2023-03-27 PROCEDURE — 3008F BODY MASS INDEX DOCD: CPT | Mod: CPTII,,, | Performed by: OBSTETRICS & GYNECOLOGY

## 2023-03-27 PROCEDURE — 3079F PR MOST RECENT DIASTOLIC BLOOD PRESSURE 80-89 MM HG: ICD-10-PCS | Mod: CPTII,,, | Performed by: OBSTETRICS & GYNECOLOGY

## 2023-03-27 PROCEDURE — 3075F SYST BP GE 130 - 139MM HG: CPT | Mod: CPTII,,, | Performed by: OBSTETRICS & GYNECOLOGY

## 2023-03-27 PROCEDURE — 99999 PR PBB SHADOW E&M-EST. PATIENT-LVL IV: ICD-10-PCS | Mod: PBBFAC,,, | Performed by: OBSTETRICS & GYNECOLOGY

## 2023-03-27 PROCEDURE — 87625 HPV TYPES 16 & 18 ONLY: CPT | Performed by: OBSTETRICS & GYNECOLOGY

## 2023-03-27 PROCEDURE — 99395 PREV VISIT EST AGE 18-39: CPT | Mod: S$PBB,,, | Performed by: OBSTETRICS & GYNECOLOGY

## 2023-03-27 PROCEDURE — 1159F PR MEDICATION LIST DOCUMENTED IN MEDICAL RECORD: ICD-10-PCS | Mod: CPTII,,, | Performed by: OBSTETRICS & GYNECOLOGY

## 2023-03-27 PROCEDURE — 88175 CYTOPATH C/V AUTO FLUID REDO: CPT | Performed by: OBSTETRICS & GYNECOLOGY

## 2023-03-27 PROCEDURE — 1160F PR REVIEW ALL MEDS BY PRESCRIBER/CLIN PHARMACIST DOCUMENTED: ICD-10-PCS | Mod: CPTII,,, | Performed by: OBSTETRICS & GYNECOLOGY

## 2023-03-27 PROCEDURE — 99214 OFFICE O/P EST MOD 30 MIN: CPT | Mod: PBBFAC | Performed by: OBSTETRICS & GYNECOLOGY

## 2023-03-27 PROCEDURE — 99395 PR PREVENTIVE VISIT,EST,18-39: ICD-10-PCS | Mod: S$PBB,,, | Performed by: OBSTETRICS & GYNECOLOGY

## 2023-03-27 PROCEDURE — 3079F DIAST BP 80-89 MM HG: CPT | Mod: CPTII,,, | Performed by: OBSTETRICS & GYNECOLOGY

## 2023-03-27 PROCEDURE — 1159F MED LIST DOCD IN RCRD: CPT | Mod: CPTII,,, | Performed by: OBSTETRICS & GYNECOLOGY

## 2023-03-27 PROCEDURE — 3008F PR BODY MASS INDEX (BMI) DOCUMENTED: ICD-10-PCS | Mod: CPTII,,, | Performed by: OBSTETRICS & GYNECOLOGY

## 2023-03-27 RX ORDER — CELECOXIB 200 MG/1
200 CAPSULE ORAL 2 TIMES DAILY PRN
COMMUNITY
Start: 2023-02-25

## 2023-03-27 RX ORDER — SEMAGLUTIDE 1.34 MG/ML
INJECTION, SOLUTION SUBCUTANEOUS
COMMUNITY
Start: 2023-03-13

## 2023-03-27 RX ORDER — QUETIAPINE FUMARATE 200 MG/1
200 TABLET, FILM COATED ORAL NIGHTLY
COMMUNITY
Start: 2023-02-24

## 2023-03-28 NOTE — PROGRESS NOTES
"  Subjective:       Patient ID: Viviana Aguayo is a 37 y.o. female.    Chief Complaint:  Well Woman (Last pap was 2021- ASCUS Pt with hysterectomy)        History of Present Illness  HPI  Annual Exam-Premenopausal  Patient presents for annual exam. The patient is sexually active. GYN screening history: last pap: approximate date 2021 and was ASCUS with negative HRHPV. The patient wears seatbelts: yes. The patient participates in regular exercise: yes. Has the patient ever been transfused or tattooed?: No/Yes. The patient reports that there is not domestic violence in her life.     Significant right pelvic pain with intercourse.  Not on left.  Previous supracervical abdominal hysterectomy with left salpingectomy. Extensive pelvic adhesions  Status post gastric sleeve 2020.    Previous  sections x 2 with tubal ligation.  Previous history of pulmonary embolus.  Status post IVC filter placement.    Right hip arthritis.  Getting regular injection.    CHTN/DM       GYN & OB History  Patient's last menstrual period was 2013.   Date of Last Pap: 3/27/2023    OB History    Para Term  AB Living   3 2 2   1 2   SAB IAB Ectopic Multiple Live Births           2      # Outcome Date GA Lbr Doug/2nd Weight Sex Delivery Anes PTL Lv   3 Term 09 40w0d  3.714 kg (8 lb 3 oz) M Vag-Spont EPI N CLIVE   2 AB 2006           1 Term 01 40w0d  3.657 kg (8 lb 1 oz) F Vag-Spont EPI N CLIVE       Past Medical History:   Diagnosis Date    Asthma     as a child    Back pain, chronic 2013    " i have herinated disk"     Diabetes mellitus     Fibromyalgia     GERD (gastroesophageal reflux disease)     Herpes simplex without mention of complication     History of pulmonary embolus during pregnancy 2009    after her second  section    Hypertension     off meds x 1 year    Mental disorder     depression and migraine headaches       Past Surgical History:   Procedure Laterality Date    " "ABDOMINOPLASTY  2021     SECTION      DILATION AND CURETTAGE OF UTERUS      for elective abortions    ESOPHAGOGASTRODUODENOSCOPY  2019    ESOPHAGOGASTRODUODENOSCOPY  2019    ESOPHAGOGASTRODUODENOSCOPY N/A 2019    Procedure: EGD (ESOPHAGOGASTRODUODENOSCOPY);  Surgeon: Doni Marcos MD;  Location: [facility] ENDO;  Service: Endoscopy;  Laterality: N/A;    Gastric sleeve  2020    HTA  2012    Dr Jiang    HYSTERECTOMY  2013    [facility]    LAPAROSCOPIC CHOLECYSTECTOMY N/A 2019    Procedure: CHOLECYSTECTOMY, LAPAROSCOPIC;  Surgeon: Henry Dillard MD;  Location: [facility] OR;  Service: General;  Laterality: N/A;  RN PRE OP 19    marsupialization of Bartholin's cyst in  and 2005 and     TUBAL LIGATION      uterine ablation  12       Family History   Problem Relation Age of Onset    Diabetes Mother     Hypertension Mother     Stroke Mother     Breast cancer Maternal Aunt        Social History     Socioeconomic History    Marital status: Single   Tobacco Use    Smoking status: Former     Types: Cigarettes     Quit date: 2015     Years since quittin.2    Smokeless tobacco: Never    Tobacco comments:     One- 2  cigarettes  / day   Substance and Sexual Activity    Alcohol use: Yes     Alcohol/week: 1.0 standard drink     Types: 1 Shots of liquor per week     Comment: rarely    Drug use: Yes     Types: Marijuana    Sexual activity: Yes     Partners: Male     Birth control/protection: Surgical   Social History Narrative    Working as a make-up artist    New partner for the past two        Current Outpatient Medications   Medication Sig Dispense Refill    albuterol (PROVENTIL/VENTOLIN HFA) 90 mcg/actuation inhaler Inhale 1-2 puffs into the lungs.      ascorbic acid, vitamin C, (VITAMIN C) 1000 MG tablet Take 1,000 mg by mouth once daily.      BD ULTRA-FINE SHORT PEN NEEDLE 31 gauge x 16" Ndle USE WITH VICTOZA ONCE DAILY      celecoxib (CELEBREX) " 200 MG capsule Take 200 mg by mouth 2 (two) times daily as needed.      famotidine (PEPCID) 20 MG tablet Take 20 mg by mouth 2 (two) times daily.      loratadine (CLARITIN) 10 mg tablet       meloxicam (MOBIC) 7.5 MG tablet Take 1 tablet (7.5 mg total) by mouth once daily. 12 tablet 0    methocarbamoL (ROBAXIN) 500 MG Tab TAKE 2 TABLETS BY MOUTH 3 (THREE) TIMES DAILY FOR 5 DAYS      OZEMPIC 0.25 mg or 0.5 mg(2 mg/1.5 mL) pen injector SMARTSI.25 Milligram(s) SUB-Q Once a Week      pantoprazole (PROTONIX) 40 MG tablet Take 40 mg by mouth once daily.      predniSONE (DELTASONE) 50 MG Tab Take 1 tablet (50 mg total) by mouth once daily. 5 tablet 0    pregabalin (LYRICA) 300 MG Cap TAKE 1 CAPSULE BY MOUTH TWICE A DAY  3    QUEtiapine (SEROQUEL) 200 MG Tab Take 200 mg by mouth every evening.      sertraline (ZOLOFT) 50 MG tablet Take 50 mg by mouth once daily.      TRUERESULT BLOOD GLUCOSE SYSTM kit USE WITH TEST STRIPS THREE TIMES A DAY  0    TRUETEST TEST STRIPS Strp TEST BLOOD GLUCOSE THREE TIMES A DAY  5    ULTRA THIN LANCETS 30 gauge Misc USE TO TEST BLOOD GLUCOSE THREE TIMES A DAY  5    valACYclovir (VALTREX) 500 MG tablet TAKE 1 TABLET BY MOUTH ONCE DAILY 30 tablet 1    VICTOZA 3-GIANNA 0.6 mg/0.1 mL (18 mg/3 mL) PnIj INJECT 1.8 MG EVERY DAY BY SUBCUTANEOUS ROUTE.  3     No current facility-administered medications for this visit.     Facility-Administered Medications Ordered in Other Visits   Medication Dose Route Frequency Provider Last Rate Last Admin    lactated ringers infusion   Intravenous Continuous Doni Marcos MD 75 mL/hr at 19 0956 New Bag at 19 1030    sodium chloride 0.9% flush 3 mL  3 mL Intravenous PRN Dnoi Marcos MD           Review of patient's allergies indicates:  No Known Allergies     Review of Systems  Review of Systems   Constitutional:  Negative for activity change, appetite change, chills, fatigue, fever and unexpected weight change.   HENT:  Negative for mouth sores.     Respiratory:  Negative for cough, shortness of breath and wheezing.    Cardiovascular:  Negative for chest pain and palpitations.   Gastrointestinal:  Negative for abdominal pain, bloating, blood in stool, constipation, nausea and vomiting.   Endocrine: Negative for diabetes and hot flashes.   Genitourinary:  Positive for dyspareunia and pelvic pain. Negative for dysmenorrhea, dysuria, frequency, hematuria, menorrhagia, menstrual problem, urgency, vaginal bleeding, vaginal discharge, vaginal pain, urinary incontinence, postcoital bleeding and vaginal odor.   Musculoskeletal:  Positive for arthralgias. Negative for back pain and myalgias.        Right hip pain   Integumentary:  Negative for rash, breast mass and nipple discharge.   Neurological:  Negative for seizures and headaches.   Psychiatric/Behavioral:  Negative for depression and sleep disturbance. The patient is not nervous/anxious.    Breast: Negative for mass, mastodynia and nipple discharge        Objective:    Physical Exam:   Constitutional: She appears well-developed and well-nourished. No distress.   BMI of 30.50    HENT:   Head: Normocephalic and atraumatic.    Eyes: EOM are normal.      Pulmonary/Chest: Effort normal. No respiratory distress.   Breasts: Non-tender, no engorgement, no masses, no retraction, no discharge. Negative for lymphadenopathy.         Abdominal: Soft. She exhibits no distension. There is no abdominal tenderness. There is no rebound and no guarding.   Abdominoplasty scars       Genitourinary:    Vagina normal.   No  no vaginal discharge in the vagina.    Genitourinary Comments: Vulva without any obvious lesions.  Urethral meatus normal size and location without any lesion.  Urethra is non-tender without stricture or discharge.  Bladder is non-tender.  Vaginal vault with good support.  Minimal white discharge noted.  No obvious lesion.  Normal rugation.  Tender over right ischial spine.  Cervix is without any cervical motion  tenderness.  No obvious lesion.  Uterus is surgically absent.  Adnexa is without any masses or tenderness.  Perineum without obvious lesion.                   Musculoskeletal: Normal range of motion.       Neurological: She is alert.    Skin: Skin is warm and dry.    Psychiatric: She has a normal mood and affect.        Assessment:        1. Well woman exam with routine gynecological exam    2. Screening mammogram, encounter for    3. Controlled type 2 diabetes mellitus without complication, without long-term current use of insulin    4. History of pulmonary embolus during pregnancy    5. Arthritis of right hip              Plan:          I have discussed with the patient her condition.  Monthly breast examination was instructed, discussed, and encouraged.  Patient was encouraged to consume a low-calorie, low fat diet, and to increase of physical activity.  Healthy habits encouraged.  A Pap smear was performed with HR-HPV according to the USPSTF recommendations.  Mammogram was ordered because of the combination of her age and risk factors, according to ACOG guidelines.  Gonorrhea and Chlamydia testing not performed;  HIV test not offered, again according to guidelines.  Colonoscopy discussed according to ACS guideline.  We also discussed her obstetric history and CV risks.   Care Gaps addressed.  Patient is to continue her medications as prescribed.   She will come back to see me in one year for her annual visit.  She can come back to see me sooner as necessary.  All of her questions were answered appropriately to her satisfaction.    We discussed her dyspareunia.  It could be related to her right hip pain/arthritis.  She will continue getting treatment with her orthopedic surgeon.

## 2023-04-04 LAB
CLINICAL INFO: NORMAL
CYTO CVX: NORMAL
CYTOLOGIST CVX/VAG CYTO: NORMAL
CYTOLOGIST CVX/VAG CYTO: NORMAL
CYTOLOGY CMNT CVX/VAG CYTO-IMP: NORMAL
CYTOLOGY PAP THIN PREP EXPLANATION: NORMAL
DATE OF PREVIOUS PAP: NORMAL
DATE PREVIOUS BX: NO
GEN CATEG CVX/VAG CYTO-IMP: NORMAL
HPV I/H RISK 4 DNA CVX QL NAA+PROBE: DETECTED
HPV16 DNA CVX QL PROBE+SIG AMP: NOT DETECTED
HPV18 DNA CVX QL PROBE+SIG AMP: NOT DETECTED
LMP START DATE: NORMAL
MICROORGANISM CVX/VAG CYTO: NORMAL
PATHOLOGIST CVX/VAG CYTO: NORMAL
SERVICE CMNT-IMP: NORMAL
SPECIMEN SOURCE CVX/VAG CYTO: NORMAL
STAT OF ADQ CVX/VAG CYTO-IMP: NORMAL

## 2023-06-04 DIAGNOSIS — A60.00 GENITAL HERPES SIMPLEX, UNSPECIFIED SITE: ICD-10-CM

## 2023-06-04 RX ORDER — VALACYCLOVIR HYDROCHLORIDE 500 MG/1
TABLET, FILM COATED ORAL
Qty: 30 TABLET | Refills: 1 | Status: SHIPPED | OUTPATIENT
Start: 2023-06-04

## 2023-08-08 DIAGNOSIS — Z12.31 ENCOUNTER FOR SCREENING MAMMOGRAM FOR MALIGNANT NEOPLASM OF BREAST: Primary | ICD-10-CM

## 2024-03-27 ENCOUNTER — HOSPITAL ENCOUNTER (EMERGENCY)
Facility: HOSPITAL | Age: 39
Discharge: HOME | End: 2024-03-27
Attending: EMERGENCY MEDICINE
Payer: MEDICAID

## 2024-03-27 ENCOUNTER — PATIENT MESSAGE (OUTPATIENT)
Dept: EMERGENCY MEDICINE | Facility: HOSPITAL | Age: 39
End: 2024-03-27

## 2024-03-27 VITALS
BODY MASS INDEX: 27.28 KG/M2 | HEART RATE: 84 BPM | WEIGHT: 180 LBS | TEMPERATURE: 98 F | HEIGHT: 68 IN | RESPIRATION RATE: 18 BRPM | OXYGEN SATURATION: 100 % | SYSTOLIC BLOOD PRESSURE: 129 MMHG | DIASTOLIC BLOOD PRESSURE: 87 MMHG

## 2024-03-27 DIAGNOSIS — S39.012A LUMBAR STRAIN, INITIAL ENCOUNTER: Primary | ICD-10-CM

## 2024-03-27 PROCEDURE — 99284 EMERGENCY DEPT VISIT MOD MDM: CPT | Mod: 25

## 2024-03-27 PROCEDURE — 96372 THER/PROPH/DIAG INJ SC/IM: CPT | Performed by: NURSE PRACTITIONER

## 2024-03-27 PROCEDURE — 63600175 PHARM REV CODE 636 W HCPCS: Performed by: NURSE PRACTITIONER

## 2024-03-27 PROCEDURE — 25000003 PHARM REV CODE 250: Performed by: NURSE PRACTITIONER

## 2024-03-27 RX ORDER — KETOROLAC TROMETHAMINE 30 MG/ML
30 INJECTION, SOLUTION INTRAMUSCULAR; INTRAVENOUS
Status: COMPLETED | OUTPATIENT
Start: 2024-03-27 | End: 2024-03-27

## 2024-03-27 RX ORDER — LIDOCAINE 50 MG/G
1 PATCH TOPICAL DAILY
Qty: 15 PATCH | Refills: 0 | Status: SHIPPED | OUTPATIENT
Start: 2024-03-27

## 2024-03-27 RX ORDER — MORPHINE SULFATE 4 MG/ML
4 INJECTION, SOLUTION INTRAMUSCULAR; INTRAVENOUS
Status: COMPLETED | OUTPATIENT
Start: 2024-03-27 | End: 2024-03-27

## 2024-03-27 RX ORDER — LIDOCAINE 50 MG/G
1 PATCH TOPICAL
Status: DISCONTINUED | OUTPATIENT
Start: 2024-03-27 | End: 2024-03-27 | Stop reason: HOSPADM

## 2024-03-27 RX ORDER — ORPHENADRINE CITRATE 100 MG/1
100 TABLET, EXTENDED RELEASE ORAL 2 TIMES DAILY
Qty: 20 TABLET | Refills: 0 | Status: SHIPPED | OUTPATIENT
Start: 2024-03-27 | End: 2024-04-06

## 2024-03-27 RX ORDER — METHYLPREDNISOLONE 4 MG/1
TABLET ORAL
Qty: 1 EACH | Refills: 0 | Status: SHIPPED | OUTPATIENT
Start: 2024-03-28 | End: 2024-04-17

## 2024-03-27 RX ORDER — HYDROCODONE BITARTRATE AND ACETAMINOPHEN 5; 325 MG/1; MG/1
1 TABLET ORAL EVERY 6 HOURS PRN
Qty: 12 TABLET | Refills: 0 | Status: SHIPPED | OUTPATIENT
Start: 2024-03-27

## 2024-03-27 RX ORDER — TRIAMCINOLONE ACETONIDE 40 MG/ML
80 INJECTION, SUSPENSION INTRA-ARTICULAR; INTRAMUSCULAR
Status: COMPLETED | OUTPATIENT
Start: 2024-03-27 | End: 2024-03-27

## 2024-03-27 RX ADMIN — LIDOCAINE 1 PATCH: 50 PATCH TOPICAL at 08:03

## 2024-03-27 RX ADMIN — MORPHINE SULFATE 4 MG: 4 INJECTION, SOLUTION INTRAMUSCULAR; INTRAVENOUS at 08:03

## 2024-03-27 RX ADMIN — KETOROLAC TROMETHAMINE 30 MG: 30 INJECTION, SOLUTION INTRAMUSCULAR at 08:03

## 2024-03-27 RX ADMIN — TRIAMCINOLONE ACETONIDE 80 MG: 40 INJECTION, SUSPENSION INTRA-ARTICULAR; INTRAMUSCULAR at 08:03

## 2024-03-27 NOTE — DISCHARGE INSTRUCTIONS
Try to get plenty of rest and stay well-hydrated on these medications.  Try to avoid long periods of standing, sitting, heavy lifting, or straining until your back pain resolves.    You can use cool or warm compresses over the affected area for relief.    Continue taking MOBIC for pain and inflammation. You can take norflex as a muscle relaxant every 12 hours, as needed.  For severe pain, you can take hydrocodone (norco) in addition to the mobic.  Please do not take norflex or hydrocodone while working, drinking alcohol, driving/operating heavy machinery, swimming. It may cause drowsiness, impair judgment, and reduce physical capabilities.    Follow up with  within 1-2 weeks if symptoms have not significantly improved.    Return to ER for any new or worsening symptoms.

## 2024-03-27 NOTE — Clinical Note
"Vviiana Solares" Olivier was seen and treated in our emergency department on 3/27/2024.  She may return to work on 04/03/2024.       If you have any questions or concerns, please don't hesitate to call.      Kamini Rhodes NP"

## 2024-03-27 NOTE — Clinical Note
"Viviana Solares" Olivier was seen and treated in our emergency department on 3/27/2024.  She may return to work on 04/01/2024.       If you have any questions or concerns, please don't hesitate to call.      Kamini Rhodes NP"

## 2024-03-27 NOTE — ED PROVIDER NOTES
"Encounter Date: 3/27/2024    SCRIBE #1 NOTE: I, Jasmin Lawton, am scribing for, and in the presence of,  Kamini Rhodes NP. I have scribed the following portions of the note - Other sections scribed: HPI, ROS,.       History     Chief Complaint   Patient presents with    Back Pain     Reports lower back pain x 1 day. Denies any recent falls or injury. Denies meds pta.     CC: back pain    HPI: This is a 38 y.o.female patient, with a PMHx of Chronic back pain (bulging disc in lumbar spine based on MRI done couple years ago), HTN and DM, presenting to the ED for further evaluation of lower back pain beginning last night. Patient reports took a shower after work and felt a popping sensation in her back when she raised her leg to step out the shower. Patient states she did say "ouch" and disregarded the pain and went to lay down and sleep. Patient reports due to the severity of pain, she was unable to get out of bed this morning. Patient states she does get injections in her back for sciatica. Patient states she has Mobic prescribed for relief but does not take it due to not having any recent back issues. Patient's pain management physician is Dr. Raya. Patient denies any recent falls, injuries, trauma or heavy lifting. Patient denies any fever, abdominal pain, dysuria, frequency, bladder incontinence, or any other associated symptoms. No prior Tx. No alleviating or aggravating factors. No known drug allergies.        The history is provided by the patient. No  was used.     Review of patient's allergies indicates:  No Known Allergies  Past Medical History:   Diagnosis Date    Asthma     as a child    Back pain, chronic 2013    " i have herinated disk"     Diabetes mellitus     Fibromyalgia     GERD (gastroesophageal reflux disease)     Herpes simplex without mention of complication     History of pulmonary embolus during pregnancy     after her second  section    Hypertension     " off meds x 1 year    Mental disorder     depression and migraine headaches     Past Surgical History:   Procedure Laterality Date    ABDOMINOPLASTY  2021     SECTION      DILATION AND CURETTAGE OF UTERUS      for elective abortions    ESOPHAGOGASTRODUODENOSCOPY  2019    ESOPHAGOGASTRODUODENOSCOPY  2019    ESOPHAGOGASTRODUODENOSCOPY N/A 2019    Procedure: EGD (ESOPHAGOGASTRODUODENOSCOPY);  Surgeon: Doni Marcos MD;  Location: [facility] ENDO;  Service: Endoscopy;  Laterality: N/A;    Gastric sleeve  2020    HTA  2012    Dr Jiang    HYSTERECTOMY  2013    [facility]    LAPAROSCOPIC CHOLECYSTECTOMY N/A 2019    Procedure: CHOLECYSTECTOMY, LAPAROSCOPIC;  Surgeon: Henry Dillard MD;  Location: [facility] OR;  Service: General;  Laterality: N/A;  RN PRE OP 19    marsupialization of Bartholin's cyst in  and 2005 and     TUBAL LIGATION      uterine ablation  12     Family History   Problem Relation Age of Onset    Diabetes Mother     Hypertension Mother     Stroke Mother     Breast cancer Maternal Aunt      Social History     Tobacco Use    Smoking status: Former     Current packs/day: 0.00     Types: Cigarettes     Quit date: 2015     Years since quittin.2    Smokeless tobacco: Never    Tobacco comments:     One- 2  cigarettes  / day   Substance Use Topics    Alcohol use: Yes     Alcohol/week: 1.0 standard drink of alcohol     Types: 1 Shots of liquor per week     Comment: rarely    Drug use: Yes     Types: Marijuana     Review of Systems   Constitutional:  Negative for fever.   HENT:  Negative for sore throat.    Eyes:  Negative for visual disturbance.   Respiratory:  Negative for cough and shortness of breath.    Cardiovascular:  Negative for chest pain.   Gastrointestinal:  Negative for abdominal pain, diarrhea, nausea and vomiting.   Genitourinary:  Negative for dysuria, frequency and urgency.   Musculoskeletal:  Positive for back pain  "(lower).   Skin:  Negative for rash.   Neurological:  Negative for headaches.       Physical Exam     Initial Vitals [03/27/24 0746]   BP Pulse Resp Temp SpO2   (!) 131/91 87 20 97.7 °F (36.5 °C) 100 %      MAP       --         Physical Exam    Nursing note and vitals reviewed.  Constitutional: She appears well-developed and well-nourished. She is not diaphoretic.   Eyes: Conjunctivae and EOM are normal. Pupils are equal, round, and reactive to light.   Neck: Neck supple.   Normal range of motion.  Pulmonary/Chest: No respiratory distress.   Musculoskeletal:      Cervical back: Normal range of motion and neck supple.      Lumbar back: No swelling, deformity or signs of trauma. Decreased range of motion (due to pain).      Comments: Moderate to severe tenderness to the midline lumbar spine. No saddle anesthesia or incontinence. Ambulatory with antalgic gait. Distal pulses intact.     Neurological: She is alert and oriented to person, place, and time. She has normal strength.   Skin: Skin is warm and dry.   Psychiatric: She has a normal mood and affect.         ED Course   Procedures  Labs Reviewed - No data to display       Imaging Results    None          Medications   ketorolac injection 30 mg (30 mg Intramuscular Given 3/27/24 0804)   morphine injection 4 mg (4 mg Intramuscular Given 3/27/24 0804)   triamcinolone acetonide injection 80 mg (80 mg Intramuscular Given 3/27/24 0841)     Medical Decision Making  This is an urgent evaluation of a 37 y/o female that presents to the ER c/o atraumatic lower back pain. Pain started immediately after she stepped out of the shower last night, when she remembers a "pop" and sudden pain to her lower back.  Symptoms were significantly worse this morning upon awakening, with pain worsening with any movement.    She has a h/o bulging disc per previous MRI at L4-L5 and L5-S1.   Based on her history and PE, most likely a MSK etiology. DDx include sprain, strain, bulging disc. There " are no signs of saddle anesthesia, incontinence, neurologic deficits, fevers, trauma to suggest cauda equina, infectious process, fracture or subluxation.  Will give medrol dose pack, norflex, lidocaine patches, and short course of painkillers for relief.  She has mobic at home that she will take in conjunction with these meds. To follow up with  in pain management for further evaluation of symptoms, or return to ER if condition persists, or for any other concerns.      Risk  Prescription drug management.            Scribe Attestation:   Scribe #1: I performed the above scribed service and the documentation accurately describes the services I performed. I attest to the accuracy of the note.                               Clinical Impression:  Final diagnoses:  [S39.012A] Lumbar strain, initial encounter (Primary)          ED Disposition Condition    Discharge Stable          ED Prescriptions       Medication Sig Dispense Start Date End Date Auth. Provider    methylPREDNISolone (MEDROL DOSEPACK) 4 mg tablet Please take as prescribed 1 each 3/28/2024 4/17/2024 Kamini Rhodes NP    orphenadrine (NORFLEX) 100 mg tablet Take 1 tablet (100 mg total) by mouth 2 (two) times daily. for 10 days 20 tablet 3/27/2024 4/6/2024 Kamini Rhodes NP    HYDROcodone-acetaminophen (NORCO) 5-325 mg per tablet Take 1 tablet by mouth every 6 (six) hours as needed for Pain. 12 tablet 3/27/2024 -- Kamini Rhodes NP    LIDOcaine (LIDODERM) 5 % Place 1 patch onto the skin once daily. Remove & Discard patch within 12 hours or as directed by MD 15 patch 3/27/2024 -- Kamini Rhodes NP          Follow-up Information       Follow up With Specialties Details Why Contact Info    Brooks Raya MD Pain Medicine Schedule an appointment as soon as possible for a visit  For re-evaluation and management of symptoms [address] [facility] [address]  [phone]      [facility] - Emergency Dept Emergency Medicine  As needed, If symptoms worsen [address]  [address]  [facility] [place] [id number] [phone]          I, Kamini Rhodes, personally performed the services described in this documentation. All medical record entries made by the scribe were at my direction and in my presence. I have reviewed the chart and agree that the record reflects my personal performance and is accurate and complete.       Kamini Rhodes, MARGE  03/27/24 5222

## 2024-10-05 ENCOUNTER — HOSPITAL ENCOUNTER (EMERGENCY)
Facility: HOSPITAL | Age: 39
Discharge: HOME | End: 2024-10-06
Attending: EMERGENCY MEDICINE
Payer: MEDICAID
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
## 2024-10-05 PROCEDURE — 99284 EMERGENCY DEPT VISIT MOD MDM: CPT | Mod: 25

## 2024-10-05 RX ORDER — MORPHINE SULFATE 4 MG/ML
4 INJECTION, SOLUTION INTRAMUSCULAR; INTRAVENOUS
Status: DISCONTINUED | OUTPATIENT
Start: 2024-10-05 | End: 2024-10-06

## 2024-10-06 VITALS
HEART RATE: 79 BPM | RESPIRATION RATE: 20 BRPM | OXYGEN SATURATION: 99 % | WEIGHT: 175 LBS | HEIGHT: 68 IN | SYSTOLIC BLOOD PRESSURE: 156 MMHG | BODY MASS INDEX: 26.52 KG/M2 | DIASTOLIC BLOOD PRESSURE: 92 MMHG | TEMPERATURE: 98 F

## 2024-10-06 PROCEDURE — 63600175 PHARM REV CODE 636 W HCPCS

## 2024-10-06 PROCEDURE — 96374 THER/PROPH/DIAG INJ IV PUSH: CPT

## 2024-10-06 PROCEDURE — 25000003 PHARM REV CODE 250

## 2024-10-06 RX ORDER — MORPHINE SULFATE 4 MG/ML
8 INJECTION, SOLUTION INTRAMUSCULAR; INTRAVENOUS
Status: COMPLETED | OUTPATIENT
Start: 2024-10-06 | End: 2024-10-06

## 2024-10-06 RX ORDER — ONDANSETRON 4 MG/1
4 TABLET, ORALLY DISINTEGRATING ORAL ONCE
Status: COMPLETED | OUTPATIENT
Start: 2024-10-06 | End: 2024-10-06

## 2024-10-06 RX ORDER — DIPHENHYDRAMINE HCL 25 MG
25 CAPSULE ORAL
Status: COMPLETED | OUTPATIENT
Start: 2024-10-06 | End: 2024-10-06

## 2024-10-06 RX ADMIN — ONDANSETRON 4 MG: 4 TABLET, ORALLY DISINTEGRATING ORAL at 12:10

## 2024-10-06 RX ADMIN — DIPHENHYDRAMINE HYDROCHLORIDE 25 MG: 25 CAPSULE ORAL at 12:10

## 2024-10-06 RX ADMIN — MORPHINE SULFATE 8 MG: 4 INJECTION INTRAVENOUS at 12:10

## 2024-10-06 NOTE — DISCHARGE INSTRUCTIONS
Please take advil or tylenol as needed for pain.    If pain or swelling increases, fever spikes or you fall and hit head and lose consciousness please come to ED.    Our goal in the emergency department is to always give you outstanding care and exceptional service. You may receive a survey by mail or e-mail in the next week regarding your experience in our ED. We would greatly appreciate your completing and returning the survey. Your feedback provides us with a way to recognize our staff who give very good care and it helps us learn how to improve when your experience was below our aspiration of excellence.

## 2024-10-06 NOTE — ED TRIAGE NOTES
"Viviana Aguayo, a 39 y.o. female presents to the ED w/ complaint of fall. [place] and slipped in water. Pt reports trying to catch herself, and landed on her right arm and the fell back onto her back. She endorses R shoulder + elbow pain, R hip pain, and mid-lower back pain. Denies hitting her head and denies LOC.     Triage note:  Chief Complaint   Patient presents with    Fall     Arrived via EMS with a c/o rt shoulder, rt elbow, rt hip, and mid to lower back pain after [place]. Denies LOC. Recent low back injections for chronic issues.     Review of patient's allergies indicates:  No Known Allergies  Past Medical History:   Diagnosis Date    Asthma     as a child    Back pain, chronic 2013    " i have herinated disk"     Diabetes mellitus     Fibromyalgia     GERD (gastroesophageal reflux disease)     Herpes simplex without mention of complication     History of pulmonary embolus during pregnancy     after her second  section    Hypertension     off meds x 1 year    Mental disorder     depression and migraine headaches       "

## 2024-10-06 NOTE — ED PROVIDER NOTES
"Encounter Date: 10/5/2024       History     Chief Complaint   Patient presents with    Fall     Arrived via EMS with a c/o rt shoulder, rt elbow, rt hip, and mid to lower back pain [place] Denies LOC. Recent low back injections for chronic issues.     Patient is a 40 yo female with PMH of chronic back pain, GERD, and depression presents s/p ground level fall complaining of right shoulder, right elbow, right hip and lumbar back pain. Patient states [place] on [place] and fell on her right shoulder/elbow/hip. Pt denies hitting head or losing consciousness. Pt denies any additional complains, denies headache, dizziness, chest pain, shortness of breath, nausea, or vomiting.         Review of patient's allergies indicates:  No Known Allergies  Past Medical History:   Diagnosis Date    Asthma     as a child    Back pain, chronic 2013    " i have herinated disk"     Diabetes mellitus     Fibromyalgia     GERD (gastroesophageal reflux disease)     Herpes simplex without mention of complication     History of pulmonary embolus during pregnancy     after her second  section    Hypertension     off meds x 1 year    Mental disorder     depression and migraine headaches     Past Surgical History:   Procedure Laterality Date    ABDOMINOPLASTY  2021     SECTION      DILATION AND CURETTAGE OF UTERUS      for elective abortions    ESOPHAGOGASTRODUODENOSCOPY  2019    ESOPHAGOGASTRODUODENOSCOPY  2019    ESOPHAGOGASTRODUODENOSCOPY N/A 2019    Procedure: EGD (ESOPHAGOGASTRODUODENOSCOPY);  Surgeon: Doni Marcos MD;  Location: [facility];  Service: Endoscopy;  Laterality: N/A;    Gastric sleeve  2020    HTA  2012    Dr Jiang    HYSTERECTOMY  2013    [facility]    LAPAROSCOPIC CHOLECYSTECTOMY N/A 2019    Procedure: CHOLECYSTECTOMY, LAPAROSCOPIC;  Surgeon: Henry Dillard MD;  Location: [facility] OR;  " Service: General;  Laterality: N/A;  RN PRE OP 19    marsupialization of Bartholin's cyst in  and 2005 and     TUBAL LIGATION  2009    uterine ablation  10     Family History   Problem Relation Name Age of Onset    Diabetes Mother      Hypertension Mother      Stroke Mother      Breast cancer Maternal Aunt       Social History     Tobacco Use    Smoking status: Former     Current packs/day: 0.00     Types: Cigarettes     Quit date: 2015     Years since quittin.7    Smokeless tobacco: Never    Tobacco comments:     One- 2  cigarettes  / day   Substance Use Topics    Alcohol use: Yes     Alcohol/week: 1.0 standard drink of alcohol     Types: 1 Shots of liquor per week     Comment: rarely    Drug use: Yes     Types: Marijuana     Review of Systems    Physical Exam     Initial Vitals   BP Pulse Resp Temp SpO2   10/05/24 2205 10/05/24 2205 10/05/24 2205 10/05/24 2231 10/05/24 2205   118/88 82 16 98.2 °F (36.8 °C) 98 %      MAP       --                Physical Exam    Constitutional: She appears well-developed and well-nourished.   HENT:   Head: Normocephalic and atraumatic.   Cardiovascular:  Normal heart sounds.           Pulmonary/Chest: Breath sounds normal.   Abdominal: Abdomen is soft. She exhibits no distension. There is no abdominal tenderness.   Musculoskeletal:         General: Tenderness present.      Comments: Tender to palpation midline lumbar spine, right shoulder, right elbow, and right hip.       Neurological: She is alert.   Skin: Skin is warm.   Psychiatric: She has a normal mood and affect.         ED Course   Procedures  Labs Reviewed - No data to display       Imaging Results              X-Ray Shoulder Trauma Right (Final result)  Result time 10/06/24 01:37:32      Final result by Alfonzo Hannon DO (10/06/24 01:37:32)                   Impression:      No acute osseous abnormality of the shoulder.      Electronically signed by: Alfonzo  Riddhi  Date:    10/06/2024  Time:    01:37               Narrative:    EXAMINATION:  XR SHOULDER TRAUMA 3 VIEW RIGHT    CLINICAL HISTORY:  Unspecified fall, initial encounter    TECHNIQUE:  Three or four views of the right shoulder were performed.    COMPARISON:  None    FINDINGS:  There is no acute fracture or dislocation of the shoulder.  Alignment is normal.  The humeral head is well seated in the glenoid.  Acromioclavicular and glenohumeral joints are unremarkable.  Remaining osseous structures are intact.                                       X-Ray Elbow Complete Right (Final result)  Result time 10/06/24 01:37:04      Final result by Alfonzo Hannon DO (10/06/24 01:37:04)                   Impression:      No acute fracture or dislocation of the elbow.      Electronically signed by: Alfonzo Hannon  Date:    10/06/2024  Time:    01:37               Narrative:    EXAMINATION:  XR ELBOW COMPLETE 3 VIEW RIGHT    CLINICAL HISTORY:  . Unspecified fall, initial encounter    TECHNIQUE:  AP, lateral, and oblique views of the right elbow were performed.    COMPARISON:  None    FINDINGS:  No acute fracture or dislocation. Alignment is normal. Joint spaces are preserved. There is no elbow joint effusion.  There is a small well corticated osseous density along the volar aspect of the elbow joint, may represent a small joint body or remote trauma.                                       X-Ray Lumbar Spine Ap And Lateral (Final result)  Result time 10/06/24 01:36:13      Final result by Alfonzo Hannon DO (10/06/24 01:36:13)                   Impression:      No acute fracture or subluxation of the lumbar spine.      Electronically signed by: Alfonzo Hannon  Date:    10/06/2024  Time:    01:36               Narrative:    EXAMINATION:  XR LUMBAR SPINE AP AND LATERAL    CLINICAL HISTORY:  fall;    TECHNIQUE:  AP, lateral and spot images were performed of the lumbar spine.    COMPARISON:  02/01/2016.    FINDINGS:  There is  an IVC filter.  There is no acute fracture or subluxation of the lumbar spine.  Alignment is normal.  Vertebral body heights and disc heights are preserved.  Remaining visualized osseous structures are intact.                                       X-Ray Hip 2 or 3 views Right with Pelvis when performed (Final result)  Result time 10/06/24 01:35:39      Final result by Alfonzo Hannon DO (10/06/24 01:35:39)                   Impression:      No acute osseous abnormality.      Electronically signed by: Alfonzo Hannon  Date:    10/06/2024  Time:    01:35               Narrative:    EXAMINATION:  XR HIP WITH PELVIS WHEN PERFORMED 2 OR 3 VIEWS RIGHT    CLINICAL HISTORY:  fall;    TECHNIQUE:  AP view of the pelvis and frog leg lateral view of the right hip were performed.    COMPARISON:  08/13/2019.    FINDINGS:  There is no acute fracture or dislocation of the pelvis or the right hip.  Alignment is normal.  The femoral heads are well seated in the acetabula.  Joint spaces are preserved.                                       Medications   morphine injection 8 mg (8 mg Intravenous Given 10/6/24 0026)   diphenhydrAMINE capsule 25 mg (25 mg Oral Given 10/6/24 0026)   ondansetron disintegrating tablet 4 mg (4 mg Oral Given 10/6/24 0054)     Medical Decision Making  Patient is a 38 yo female with PMH of chronic back pain, GERD, and depression presents s/p ground level fall complaining of right shoulder, right elbow, right hip and lumbar back pain. Xray of lumbar back, Xray of right shoulder, xray of right elbow and xray of right hip show no acute abnormalities. Patient was given Morphine in the ED. Patient ok to DC home, take OTC pain meds as needed, and told to follow-up with PCP as needed.     Amount and/or Complexity of Data Reviewed  Radiology: ordered.    Risk  OTC drugs.  Prescription drug management.                                      Clinical Impression:  Final diagnoses:  [W19.XXXA] Fall (Primary)          ED  Disposition Condition    Discharge Stable          ED Prescriptions    None       Follow-up Information       Follow up With Specialties Details Why Contact Info    Vinod Tiwari III, MD Internal Medicine  As needed [address] [facility] [address]  [place] [facility]  [place] [address]  [phone]               Zakia Pak MD  Resident  10/06/24 1167

## 2025-02-06 DIAGNOSIS — Z12.31 ENCOUNTER FOR SCREENING MAMMOGRAM FOR MALIGNANT NEOPLASM OF BREAST: Primary | ICD-10-CM

## 2025-07-29 ENCOUNTER — HOSPITAL ENCOUNTER (EMERGENCY)
Facility: HOSPITAL | Age: 40
Discharge: HOME | End: 2025-07-29
Attending: EMERGENCY MEDICINE
Payer: MEDICAID

## 2025-07-29 VITALS
TEMPERATURE: 99 F | HEART RATE: 97 BPM | DIASTOLIC BLOOD PRESSURE: 68 MMHG | RESPIRATION RATE: 20 BRPM | SYSTOLIC BLOOD PRESSURE: 136 MMHG | OXYGEN SATURATION: 97 % | BODY MASS INDEX: 27.37 KG/M2 | WEIGHT: 180 LBS

## 2025-07-29 DIAGNOSIS — R19.7 NAUSEA VOMITING AND DIARRHEA: Primary | ICD-10-CM

## 2025-07-29 DIAGNOSIS — R11.2 NAUSEA VOMITING AND DIARRHEA: Primary | ICD-10-CM

## 2025-07-29 LAB
ABSOLUTE EOSINOPHIL (OHS): 0.2 K/UL
ABSOLUTE MONOCYTE (OHS): 0.52 K/UL (ref 0.3–1)
ABSOLUTE NEUTROPHIL COUNT (OHS): 2.36 K/UL (ref 1.8–7.7)
ALBUMIN SERPL BCP-MCNC: 4.1 G/DL (ref 3.5–5.2)
ALP SERPL-CCNC: 49 UNIT/L (ref 40–150)
ALT SERPL W/O P-5'-P-CCNC: 18 UNIT/L (ref 10–44)
ANION GAP (OHS): 6 MMOL/L (ref 8–16)
AST SERPL-CCNC: 23 UNIT/L (ref 11–45)
BASOPHILS # BLD AUTO: 0.06 K/UL
BASOPHILS NFR BLD AUTO: 1 %
BILIRUB SERPL-MCNC: 0.4 MG/DL (ref 0.1–1)
BUN SERPL-MCNC: 10 MG/DL (ref 6–20)
CALCIUM SERPL-MCNC: 9.4 MG/DL (ref 8.7–10.5)
CHLORIDE SERPL-SCNC: 109 MMOL/L (ref 95–110)
CO2 SERPL-SCNC: 23 MMOL/L (ref 23–29)
CREAT SERPL-MCNC: 0.7 MG/DL (ref 0.5–1.4)
ERYTHROCYTE [DISTWIDTH] IN BLOOD BY AUTOMATED COUNT: 13.1 % (ref 11.5–14.5)
GFR SERPLBLD CREATININE-BSD FMLA CKD-EPI: >60 ML/MIN/1.73/M2
GLUCOSE SERPL-MCNC: 72 MG/DL (ref 70–110)
HCT VFR BLD AUTO: 39.5 % (ref 37–48.5)
HGB BLD-MCNC: 13.2 GM/DL (ref 12–16)
IMM GRANULOCYTES # BLD AUTO: 0.01 K/UL (ref 0–0.04)
IMM GRANULOCYTES NFR BLD AUTO: 0.2 % (ref 0–0.5)
LYMPHOCYTES # BLD AUTO: 2.71 K/UL (ref 1–4.8)
MAGNESIUM SERPL-MCNC: 1.9 MG/DL (ref 1.6–2.6)
MCH RBC QN AUTO: 29.8 PG (ref 27–31)
MCHC RBC AUTO-ENTMCNC: 33.4 G/DL (ref 32–36)
MCV RBC AUTO: 89 FL (ref 82–98)
NUCLEATED RBC (/100WBC) (OHS): 0 /100 WBC
PLATELET # BLD AUTO: 220 K/UL (ref 150–450)
PMV BLD AUTO: 11.5 FL (ref 9.2–12.9)
POTASSIUM SERPL-SCNC: 4 MMOL/L (ref 3.5–5.1)
PROT SERPL-MCNC: 7.1 GM/DL (ref 6–8.4)
RBC # BLD AUTO: 4.43 M/UL (ref 4–5.4)
RELATIVE EOSINOPHIL (OHS): 3.4 %
RELATIVE LYMPHOCYTE (OHS): 46.2 % (ref 18–48)
RELATIVE MONOCYTE (OHS): 8.9 % (ref 4–15)
RELATIVE NEUTROPHIL (OHS): 40.3 % (ref 38–73)
SODIUM SERPL-SCNC: 138 MMOL/L (ref 136–145)
WBC # BLD AUTO: 5.86 K/UL (ref 3.9–12.7)

## 2025-07-29 PROCEDURE — 83735 ASSAY OF MAGNESIUM: CPT

## 2025-07-29 PROCEDURE — 85025 COMPLETE CBC W/AUTO DIFF WBC: CPT

## 2025-07-29 PROCEDURE — 82040 ASSAY OF SERUM ALBUMIN: CPT

## 2025-07-29 PROCEDURE — 96372 THER/PROPH/DIAG INJ SC/IM: CPT

## 2025-07-29 PROCEDURE — 25000003 PHARM REV CODE 250

## 2025-07-29 PROCEDURE — 99284 EMERGENCY DEPT VISIT MOD MDM: CPT | Mod: 25

## 2025-07-29 PROCEDURE — 63600175 PHARM REV CODE 636 W HCPCS

## 2025-07-29 PROCEDURE — 96374 THER/PROPH/DIAG INJ IV PUSH: CPT

## 2025-07-29 RX ORDER — DICYCLOMINE HYDROCHLORIDE 10 MG/ML
20 INJECTION INTRAMUSCULAR
Status: COMPLETED | OUTPATIENT
Start: 2025-07-29 | End: 2025-07-29

## 2025-07-29 RX ORDER — ONDANSETRON HYDROCHLORIDE 2 MG/ML
8 INJECTION, SOLUTION INTRAVENOUS
Status: COMPLETED | OUTPATIENT
Start: 2025-07-29 | End: 2025-07-29

## 2025-07-29 RX ORDER — ONDANSETRON 8 MG/1
8 TABLET, ORALLY DISINTEGRATING ORAL EVERY 6 HOURS PRN
Qty: 14 TABLET | Refills: 0 | Status: SHIPPED | OUTPATIENT
Start: 2025-07-29

## 2025-07-29 RX ORDER — DICYCLOMINE HYDROCHLORIDE 20 MG/1
20 TABLET ORAL 2 TIMES DAILY
Qty: 20 TABLET | Refills: 0 | Status: SHIPPED | OUTPATIENT
Start: 2025-07-29 | End: 2025-08-08

## 2025-07-29 RX ORDER — SODIUM CHLORIDE 9 MG/ML
1000 INJECTION, SOLUTION INTRAVENOUS
Status: COMPLETED | OUTPATIENT
Start: 2025-07-29 | End: 2025-07-29

## 2025-07-29 RX ADMIN — DICYCLOMINE HYDROCHLORIDE 20 MG: 10 INJECTION, SOLUTION INTRAMUSCULAR at 02:07

## 2025-07-29 RX ADMIN — SODIUM CHLORIDE 1000 ML: 9 INJECTION, SOLUTION INTRAVENOUS at 02:07

## 2025-07-29 RX ADMIN — ONDANSETRON 8 MG: 2 INJECTION INTRAMUSCULAR; INTRAVENOUS at 02:07

## 2025-07-29 NOTE — ED PROVIDER NOTES
"Encounter Date: 2025       History     Chief Complaint   Patient presents with    Abdominal Pain     Pt presents to ED with complaint abdominal pain with n/v/d x2 days. Pt believes sh drank some bad tea. Pt denies any treatment at home.      Patient is a 40-year-old female with a past medical history of hypertension, diabetes, GERD, fibromyalgia presents to the emergency department for evaluation of nausea, vomiting, diarrhea x 3 days.  Reports multiple episodes of both.  Denies hematemesis or blood in stool.  Reports symptoms began after drinking tea from [employer].  Reports noticing slimy material in the bottom.  Reports family members at home sick with similar symptoms.  Denies fever.  Reports cramping in abdomen with diarrhea.  Reports intermittent lightheadedness and feeling overall unwell. Denies headache, dizziness, chest pain, shortness of breath.  No lower urinary tract symptoms.  No other complaints.    The history is provided by the patient.     Review of patient's allergies indicates:  No Known Allergies  Past Medical History:   Diagnosis Date    Asthma     as a child    Back pain, chronic 2013    " i have herinated disk"     Diabetes mellitus     Fibromyalgia     GERD (gastroesophageal reflux disease)     Herpes simplex without mention of complication     History of pulmonary embolus during pregnancy     after her second  section    Hypertension     off meds x 1 year    Mental disorder     depression and migraine headaches     Past Surgical History:   Procedure Laterality Date    ABDOMINOPLASTY  2021     SECTION      DILATION AND CURETTAGE OF UTERUS      for elective abortions    ESOPHAGOGASTRODUODENOSCOPY  2019    ESOPHAGOGASTRODUODENOSCOPY  2019    ESOPHAGOGASTRODUODENOSCOPY N/A 2019    Procedure: EGD (ESOPHAGOGASTRODUODENOSCOPY);  Surgeon: Doni Marcos MD;  Location: [facility];  Service: Endoscopy;  Laterality: N/A;    Gastric sleeve  " 08/2020    HTA  12/21/2012    Dr Jiang    HYSTERECTOMY  06/19/2013    [facility]    LAPAROSCOPIC CHOLECYSTECTOMY N/A 4/11/2019    Procedure: CHOLECYSTECTOMY, LAPAROSCOPIC;  Surgeon: Henry Dillard MD;  Location: [facility];  Service: General;  Laterality: N/A;  RN PRE OP 4-9-19    marsupialization of Bartholin's cyst in 2004 and 2005 2004 and 2005    TUBAL LIGATION  2009    uterine ablation  12/21/12     Family History   Problem Relation Name Age of Onset    Diabetes Mother      Hypertension Mother      Stroke Mother      Breast cancer Maternal Aunt       Social History[1]  Review of Systems   Constitutional:  Negative for chills and fever.   HENT:  Negative for congestion.    Respiratory:  Negative for cough and shortness of breath.    Cardiovascular:  Negative for chest pain.   Gastrointestinal:  Positive for abdominal pain, diarrhea, nausea and vomiting. Negative for blood in stool.   Genitourinary:  Negative for decreased urine volume, dysuria, flank pain, frequency, hematuria, vaginal bleeding and vaginal discharge.   Musculoskeletal:  Negative for back pain, neck pain and neck stiffness.   Neurological:  Negative for dizziness, light-headedness and headaches.       Physical Exam     Initial Vitals [07/29/25 1403]   BP Pulse Resp Temp SpO2   136/68 97 20 98.5 °F (36.9 °C) 97 %      MAP       --         Physical Exam    Nursing note and vitals reviewed.  Constitutional: She appears well-developed and well-nourished.   Patient gagging on exam into emesis bag.   HENT:   Head: Normocephalic and atraumatic.   Right Ear: External ear normal.   Left Ear: External ear normal.   Moist mucous membranes.   Neck: Carotid bruit is not present.   Normal range of motion.  Cardiovascular:  Normal rate, regular rhythm, normal heart sounds and intact distal pulses.     Exam reveals no gallop and no friction rub.       No murmur heard.  Pulmonary/Chest: Breath sounds normal. No respiratory distress. She has no wheezes. She has no  rhonchi. She has no rales.   Abdominal: Abdomen is soft. Bowel sounds are normal. She exhibits no distension. There is no abdominal tenderness. There is no rebound and no guarding.   Musculoskeletal:         General: Normal range of motion.      Cervical back: Normal range of motion.     Neurological: She is alert and oriented to person, place, and time. GCS score is 15. GCS eye subscore is 4. GCS verbal subscore is 5. GCS motor subscore is 6.   Psychiatric: She has a normal mood and affect.         ED Course   Procedures  Labs Reviewed   COMPREHENSIVE METABOLIC PANEL - Abnormal       Result Value    Sodium 138      Potassium 4.0      Chloride 109      CO2 23      Glucose 72      BUN 10      Creatinine 0.7      Calcium 9.4      Protein Total 7.1      Albumin 4.1      Bilirubin Total 0.4      ALP 49      AST 23      ALT 18      Anion Gap 6 (*)     eGFR >60     MAGNESIUM - Normal    Magnesium  1.9     CBC WITH DIFFERENTIAL - Normal    WBC 5.86      RBC 4.43      HGB 13.2      HCT 39.5      MCV 89      MCH 29.8      MCHC 33.4      RDW 13.1      Platelet Count 220      MPV 11.5      Nucleated RBC 0      Neut % 40.3      Lymph % 46.2      Mono % 8.9      Eos % 3.4      Basophil % 1.0      Imm Grans % 0.2      Neut # 2.36      Lymph # 2.71      Mono # 0.52      Eos # 0.20      Baso # 0.06      Imm Grans # 0.01     CBC W/ AUTO DIFFERENTIAL    Narrative:     The following orders were created for panel order CBC auto differential.  Procedure                               Abnormality         Status                     ---------                               -----------         ------                     CBC with Differential[[id number]]       Normal              Final result                 Please view results for these tests on the individual orders.          Imaging Results    None          Medications   0.9% NaCl infusion (1,000 mLs Intravenous New Bag 7/29/25 8255)   ondansetron injection 8 mg (8 mg Intravenous Given  7/29/25 1427)   dicyclomine injection 20 mg (20 mg Intramuscular Given 7/29/25 1423)     Medical Decision Making  This is an emergent evaluation of a 40-year-old female with a past medical history of hypertension, diabetes, GERD, fibromyalgia presents to the emergency department for evaluation of nausea, vomiting, diarrhea x 3 days.    Physical exam as above.    Differential diagnosis includes but is not limited to viral gastroenteritis, COVID, flu, other viral syndrome, dehydration, electrolyte derangement.    Workup initiated with basic labs, magnesium, urinalysis.  Ordered fluids, Bentyl, Zofran.  Vital signs, chart, labs, and/or imaging were all reviewed.  See ED course below and interpretations above. My overall impression is viral gastroenteritis.  Patient's symptoms improvement in the ED, tolerating p.o. prior to discharge. Will discharge home with Zofran, Bentyl with PCP follow-up. Vital signs are reassuring. Patient/Caregiver is stable for discharge at this time.  Patient/Caregiver was informed of results, plan of care, and are comfortable with this.  All questions and concerns were addressed. Discussed strict return precautions with the patient/caregiver. Instructed follow up with primary care provider within 1 week.      Rd Cosme PA-C    DISCLAIMER: This note was prepared with [employer] voice recognition transcription software. Garbled syntax, mangled pronouns, and other bizarre constructions may be attributed to that software system.       Amount and/or Complexity of Data Reviewed  Labs: ordered. Decision-making details documented in ED Course.    Risk  Prescription drug management.               ED Course as of 07/29/25 1536   Tue Jul 29, 2025   1410 BP: 136/68 [TM]   1410 Temp: 98.5 °F (36.9 °C) [TM]   1410 Pulse: 97 [TM]   1410 Resp: 20 [TM]   1410 SpO2: 97 % [TM]   1445 CBC auto differential  CBC is without leukocytosis or anemia.  Normal platelet count. [TM]   1503 Comprehensive metabolic  panel(!)  Normal renal function, no electrolyte derangement. [TM]   1503 Magnesium  Normal [TM]   1515 Will discharge home with Zofran Bentyl and have patient follow up with primary care.  Patient feeling improved.  Tolerating p.o.. No distress noted. [TM]      ED Course User Index  [TM] Rd Cosme PA-C                               Clinical Impression:  Final diagnoses:  [R11.2, R19.7] Nausea vomiting and diarrhea (Primary)          ED Disposition Condition    Discharge Stable          ED Prescriptions       Medication Sig Dispense Start Date End Date Auth. Provider    ondansetron (ZOFRAN-ODT) 8 MG TbDL Take 1 tablet (8 mg total) by mouth every 6 (six) hours as needed. 14 tablet 7/29/2025 -- Rd Cosme PA-C    dicyclomine (BENTYL) 20 mg tablet Take 1 tablet (20 mg total) by mouth 2 (two) times daily. for 10 days 20 tablet 7/29/2025 8/8/2025 Rd Cosme PA-C          Follow-up Information       Follow up With Specialties Details Why Contact [facility] Emergency Dept Emergency Medicine Go to  As needed, If symptoms worsen, or new symptoms develop [address] [facility] [place] [address] [phone]    Primary care doctor  Schedule an appointment as soon as possible for a visit in 3 days                     [1]   Social History  Tobacco Use    Smoking status: Former     Current packs/day: 0.00     Types: Cigarettes     Quit date: 1/1/2015     Years since quitting: 10.5    Smokeless tobacco: Never    Tobacco comments:     One- 2  cigarettes  / day   Substance Use Topics    Alcohol use: Yes     Alcohol/week: 1.0 standard drink of alcohol     Types: 1 Shots of liquor per week     Comment: rarely    Drug use: Yes     Types: Marijuana        Rd Cosme PA-C  07/29/25 [id number]

## 2025-07-29 NOTE — DISCHARGE INSTRUCTIONS
You were seen in the emergency department today for nausea vomiting diarrhea.  Please take all medications as prescribed and as we discussed.  Follow-up with specialist if instructed to do so.  It is important to remember that some problems are difficult to diagnose and may not be found during your Emergency Department visit. Be sure to follow up with your primary care doctor and review all labs/imaging/tests that were performed during this visit with them. Some labs/tests may be outside of the normal range and require non-emergent follow-up and further investigation to help diagnose/exclude/prevent complications or other medical conditions. Return to the emergency department for any new or worsening symptoms. Thank you for allowing me to care for you today, it was my pleasure. I hope you get to feeling better soon!

## 2025-07-29 NOTE — Clinical Note
"Viviana Solares" Olivier was seen and treated in our emergency department on 7/29/2025.  She may return to work on 07/31/2025.       If you have any questions or concerns, please don't hesitate to call.      Rd Cosme PA-C"

## 2025-08-20 ENCOUNTER — PATIENT MESSAGE (OUTPATIENT)
Dept: OBSTETRICS AND GYNECOLOGY | Facility: CLINIC | Age: 40
End: 2025-08-20

## 2025-08-20 ENCOUNTER — OFFICE VISIT (OUTPATIENT)
Dept: OBSTETRICS AND GYNECOLOGY | Facility: CLINIC | Age: 40
End: 2025-08-20
Payer: MEDICAID

## 2025-08-20 VITALS
SYSTOLIC BLOOD PRESSURE: 114 MMHG | BODY MASS INDEX: 28.03 KG/M2 | HEIGHT: 68 IN | WEIGHT: 184.94 LBS | DIASTOLIC BLOOD PRESSURE: 72 MMHG

## 2025-08-20 DIAGNOSIS — Z90.711 STATUS POST ABDOMINAL SUPRACERVICAL SUBTOTAL HYSTERECTOMY: ICD-10-CM

## 2025-08-20 DIAGNOSIS — E11.9 CONTROLLED TYPE 2 DIABETES MELLITUS WITHOUT COMPLICATION, WITHOUT LONG-TERM CURRENT USE OF INSULIN: ICD-10-CM

## 2025-08-20 DIAGNOSIS — Z86.711 HISTORY OF PULMONARY EMBOLUS DURING PREGNANCY: ICD-10-CM

## 2025-08-20 DIAGNOSIS — Z01.419 WELL WOMAN EXAM WITH ROUTINE GYNECOLOGICAL EXAM: Primary | ICD-10-CM

## 2025-08-20 DIAGNOSIS — R10.2 PELVIC PAIN: ICD-10-CM

## 2025-08-20 DIAGNOSIS — R10.2 VAGINAL PAIN: ICD-10-CM

## 2025-08-20 DIAGNOSIS — Z87.59 HISTORY OF PULMONARY EMBOLUS DURING PREGNANCY: ICD-10-CM

## 2025-08-20 DIAGNOSIS — Z12.31 SCREENING MAMMOGRAM, ENCOUNTER FOR: ICD-10-CM

## 2025-08-20 PROCEDURE — 99215 OFFICE O/P EST HI 40 MIN: CPT | Mod: PBBFAC | Performed by: OBSTETRICS & GYNECOLOGY

## 2025-08-20 PROCEDURE — 99999 PR PBB SHADOW E&M-EST. PATIENT-LVL V: CPT | Mod: PBBFAC,,, | Performed by: OBSTETRICS & GYNECOLOGY

## 2025-08-20 PROCEDURE — 87624 HPV HI-RISK TYP POOLED RSLT: CPT | Performed by: OBSTETRICS & GYNECOLOGY

## 2025-08-20 RX ORDER — SEMAGLUTIDE 2.68 MG/ML
2 INJECTION, SOLUTION SUBCUTANEOUS
COMMUNITY
Start: 2025-08-04

## 2025-08-20 RX ORDER — TRAMADOL HYDROCHLORIDE 50 MG/1
50-100 TABLET, FILM COATED ORAL
COMMUNITY
Start: 2025-07-22

## 2025-08-20 RX ORDER — SUMATRIPTAN SUCCINATE 100 MG/1
TABLET ORAL
COMMUNITY
Start: 2025-03-10

## 2025-08-20 RX ORDER — TOPIRAMATE 25 MG/1
TABLET, FILM COATED ORAL
COMMUNITY
Start: 2025-03-10

## (undated) DEVICE — SOL NS 1000CC

## (undated) DEVICE — TROCAR ENDO Z THREAD KII 5X100

## (undated) DEVICE — SEE L#120831

## (undated) DEVICE — APPLIER CLIP ENDO LIGAMAX 5MM

## (undated) DEVICE — CANISTER SUCTION 2 LTR

## (undated) DEVICE — DRESSING ADH ISLAND 2.5 X 3

## (undated) DEVICE — NDL HYPO REG 25G X 1 1/2

## (undated) DEVICE — SUT VICRYL+ 27 UR-6 VIOL

## (undated) DEVICE — ADHESIVE DERMABOND ADVANCED

## (undated) DEVICE — IRRIGATOR ENDOSCOPY DISP.

## (undated) DEVICE — GLOVE SURGICAL LATEX SZ 7

## (undated) DEVICE — SYR 10CC LUER LOCK

## (undated) DEVICE — SUT MONOCRYL 4-0 PS-2

## (undated) DEVICE — SEE MEDLINE ITEM 152622

## (undated) DEVICE — BLANKET UPPER BODY 78.7X29.9IN

## (undated) DEVICE — APPLICATOR CHLORAPREP ORN 26ML

## (undated) DEVICE — SEE MEDLINE ITEM 157117

## (undated) DEVICE — ELECTRODE REM PLYHSV RETURN 9

## (undated) DEVICE — TROCAR ENDOPATH XCEL 11MM 10CM

## (undated) DEVICE — SCISSOR CURVED ENDOPATH 5MM

## (undated) DEVICE — TUBING INSUFFLATION 10

## (undated) DEVICE — DISSECTOR CURVED 5DCD

## (undated) DEVICE — PACK ENDOSCOPY GENERAL

## (undated) DEVICE — Device

## (undated) DEVICE — BAG TISS RETRV MONARCH 10MM